# Patient Record
Sex: FEMALE | Race: WHITE | NOT HISPANIC OR LATINO | Employment: OTHER | URBAN - METROPOLITAN AREA
[De-identification: names, ages, dates, MRNs, and addresses within clinical notes are randomized per-mention and may not be internally consistent; named-entity substitution may affect disease eponyms.]

---

## 2018-04-28 ENCOUNTER — HOSPITAL ENCOUNTER (EMERGENCY)
Facility: HOSPITAL | Age: 48
Discharge: HOME | End: 2018-04-28
Attending: EMERGENCY MEDICINE | Admitting: EMERGENCY MEDICINE

## 2018-04-28 ENCOUNTER — APPOINTMENT (OUTPATIENT)
Dept: GENERAL RADIOLOGY | Facility: HOSPITAL | Age: 48
End: 2018-04-28

## 2018-04-28 VITALS
HEART RATE: 75 BPM | SYSTOLIC BLOOD PRESSURE: 122 MMHG | WEIGHT: 210 LBS | BODY MASS INDEX: 33.75 KG/M2 | DIASTOLIC BLOOD PRESSURE: 81 MMHG | HEIGHT: 66 IN | RESPIRATION RATE: 18 BRPM | OXYGEN SATURATION: 100 % | TEMPERATURE: 97.5 F

## 2018-04-28 DIAGNOSIS — M79.672 LEFT FOOT PAIN: Primary | ICD-10-CM

## 2018-04-28 PROCEDURE — 73630 X-RAY EXAM OF FOOT: CPT

## 2018-04-28 PROCEDURE — 99283 EMERGENCY DEPT VISIT LOW MDM: CPT

## 2018-04-28 RX ORDER — DICLOFENAC SODIUM 75 MG/1
75 TABLET, DELAYED RELEASE ORAL 2 TIMES DAILY
Qty: 20 TABLET | Refills: 0 | Status: ON HOLD | OUTPATIENT
Start: 2018-04-28 | End: 2020-07-06

## 2018-04-28 RX ORDER — AMITRIPTYLINE HYDROCHLORIDE 100 MG/1
100 TABLET, FILM COATED ORAL NIGHTLY
Status: ON HOLD | COMMUNITY
End: 2020-07-06

## 2019-06-12 ENCOUNTER — APPOINTMENT (OUTPATIENT)
Dept: GENERAL RADIOLOGY | Facility: HOSPITAL | Age: 49
End: 2019-06-12

## 2019-06-12 ENCOUNTER — HOSPITAL ENCOUNTER (EMERGENCY)
Facility: HOSPITAL | Age: 49
Discharge: HOME | End: 2019-06-12
Attending: EMERGENCY MEDICINE | Admitting: EMERGENCY MEDICINE

## 2019-06-12 VITALS
DIASTOLIC BLOOD PRESSURE: 60 MMHG | HEART RATE: 98 BPM | HEIGHT: 66 IN | SYSTOLIC BLOOD PRESSURE: 113 MMHG | RESPIRATION RATE: 18 BRPM | BODY MASS INDEX: 30.86 KG/M2 | OXYGEN SATURATION: 96 % | TEMPERATURE: 97.6 F | WEIGHT: 192 LBS

## 2019-06-12 DIAGNOSIS — S52.571A OTHER CLOSED INTRA-ARTICULAR FRACTURE OF DISTAL END OF RIGHT RADIUS, INITIAL ENCOUNTER: Primary | ICD-10-CM

## 2019-06-12 PROCEDURE — 73110 X-RAY EXAM OF WRIST: CPT

## 2019-06-12 PROCEDURE — 99283 EMERGENCY DEPT VISIT LOW MDM: CPT

## 2019-06-12 RX ORDER — HYDROCODONE BITARTRATE AND ACETAMINOPHEN 5; 325 MG/1; MG/1
1 TABLET ORAL EVERY 6 HOURS PRN
Qty: 20 TABLET | Refills: 0 | OUTPATIENT
Start: 2019-06-12 | End: 2020-06-16

## 2019-06-12 RX ORDER — ONDANSETRON 4 MG/1
4 TABLET, ORALLY DISINTEGRATING ORAL ONCE
Status: COMPLETED | OUTPATIENT
Start: 2019-06-12 | End: 2019-06-12

## 2019-06-12 RX ORDER — HYDROCODONE BITARTRATE AND ACETAMINOPHEN 5; 325 MG/1; MG/1
1 TABLET ORAL ONCE
Status: COMPLETED | OUTPATIENT
Start: 2019-06-12 | End: 2019-06-12

## 2019-06-12 RX ADMIN — ONDANSETRON 4 MG: 4 TABLET, ORALLY DISINTEGRATING ORAL at 19:41

## 2019-06-12 RX ADMIN — HYDROCODONE BITARTRATE AND ACETAMINOPHEN 1 TABLET: 5; 325 TABLET ORAL at 19:41

## 2019-06-13 ENCOUNTER — APPOINTMENT (OUTPATIENT)
Dept: GENERAL RADIOLOGY | Facility: HOSPITAL | Age: 49
End: 2019-06-13

## 2019-06-13 ENCOUNTER — APPOINTMENT (OUTPATIENT)
Dept: CT IMAGING | Facility: HOSPITAL | Age: 49
End: 2019-06-13

## 2019-06-13 ENCOUNTER — HOSPITAL ENCOUNTER (EMERGENCY)
Facility: HOSPITAL | Age: 49
Discharge: HOME | End: 2019-06-13
Attending: EMERGENCY MEDICINE | Admitting: EMERGENCY MEDICINE

## 2019-06-13 VITALS
BODY MASS INDEX: 32.54 KG/M2 | TEMPERATURE: 98.9 F | HEART RATE: 78 BPM | DIASTOLIC BLOOD PRESSURE: 91 MMHG | SYSTOLIC BLOOD PRESSURE: 163 MMHG | WEIGHT: 207.3 LBS | RESPIRATION RATE: 15 BRPM | HEIGHT: 67 IN | OXYGEN SATURATION: 95 %

## 2019-06-13 DIAGNOSIS — S40.021A CONTUSION OF MULTIPLE SITES OF RIGHT UPPER EXTREMITY, INITIAL ENCOUNTER: ICD-10-CM

## 2019-06-13 DIAGNOSIS — R93.0 ABNORMAL HEAD CT: ICD-10-CM

## 2019-06-13 DIAGNOSIS — Z91.14 NONCOMPLIANCE WITH MEDICATION REGIMEN: ICD-10-CM

## 2019-06-13 DIAGNOSIS — R56.9 SEIZURE (HCC): Primary | ICD-10-CM

## 2019-06-13 DIAGNOSIS — S20.212A CONTUSION OF LEFT CHEST WALL, INITIAL ENCOUNTER: ICD-10-CM

## 2019-06-13 LAB
ANION GAP SERPL CALCULATED.3IONS-SCNC: 9.8 MMOL/L
BASOPHILS # BLD AUTO: 0.04 10*3/MM3 (ref 0–0.2)
BASOPHILS NFR BLD AUTO: 0.4 % (ref 0–1.5)
BUN BLD-MCNC: 11 MG/DL (ref 6–20)
BUN/CREAT SERPL: 16.2 (ref 7–25)
CALCIUM SPEC-SCNC: 9.2 MG/DL (ref 8.6–10.5)
CHLORIDE SERPL-SCNC: 105 MMOL/L (ref 98–107)
CO2 SERPL-SCNC: 28.2 MMOL/L (ref 22–29)
CREAT BLD-MCNC: 0.68 MG/DL (ref 0.57–1)
DEPRECATED RDW RBC AUTO: 43 FL (ref 37–54)
EOSINOPHIL # BLD AUTO: 0.07 10*3/MM3 (ref 0–0.4)
EOSINOPHIL NFR BLD AUTO: 0.7 % (ref 0.3–6.2)
ERYTHROCYTE [DISTWIDTH] IN BLOOD BY AUTOMATED COUNT: 12.7 % (ref 12.3–15.4)
GFR SERPL CREATININE-BSD FRML MDRD: 92 ML/MIN/1.73
GLUCOSE BLD-MCNC: 100 MG/DL (ref 65–99)
HCT VFR BLD AUTO: 48.9 % (ref 34–46.6)
HGB BLD-MCNC: 15.4 G/DL (ref 12–15.9)
IMM GRANULOCYTES # BLD AUTO: 0.03 10*3/MM3 (ref 0–0.05)
IMM GRANULOCYTES NFR BLD AUTO: 0.3 % (ref 0–0.5)
LYMPHOCYTES # BLD AUTO: 0.95 10*3/MM3 (ref 0.7–3.1)
LYMPHOCYTES NFR BLD AUTO: 9.8 % (ref 19.6–45.3)
MCH RBC QN AUTO: 28.8 PG (ref 26.6–33)
MCHC RBC AUTO-ENTMCNC: 31.5 G/DL (ref 31.5–35.7)
MCV RBC AUTO: 91.6 FL (ref 79–97)
MONOCYTES # BLD AUTO: 0.6 10*3/MM3 (ref 0.1–0.9)
MONOCYTES NFR BLD AUTO: 6.2 % (ref 5–12)
NEUTROPHILS # BLD AUTO: 8.05 10*3/MM3 (ref 1.7–7)
NEUTROPHILS NFR BLD AUTO: 82.6 % (ref 42.7–76)
NRBC BLD AUTO-RTO: 0 /100 WBC (ref 0–0.2)
PLATELET # BLD AUTO: 275 10*3/MM3 (ref 140–450)
PMV BLD AUTO: 8.9 FL (ref 6–12)
POTASSIUM BLD-SCNC: 4.7 MMOL/L (ref 3.5–5.2)
RBC # BLD AUTO: 5.34 10*6/MM3 (ref 3.77–5.28)
SODIUM BLD-SCNC: 143 MMOL/L (ref 136–145)
WBC NRBC COR # BLD: 9.74 10*3/MM3 (ref 3.4–10.8)

## 2019-06-13 PROCEDURE — 96374 THER/PROPH/DIAG INJ IV PUSH: CPT

## 2019-06-13 PROCEDURE — 25010000002 MORPHINE PER 10 MG: Performed by: EMERGENCY MEDICINE

## 2019-06-13 PROCEDURE — 73110 X-RAY EXAM OF WRIST: CPT

## 2019-06-13 PROCEDURE — 99284 EMERGENCY DEPT VISIT MOD MDM: CPT

## 2019-06-13 PROCEDURE — 85025 COMPLETE CBC W/AUTO DIFF WBC: CPT | Performed by: PHYSICIAN ASSISTANT

## 2019-06-13 PROCEDURE — 80048 BASIC METABOLIC PNL TOTAL CA: CPT | Performed by: PHYSICIAN ASSISTANT

## 2019-06-13 PROCEDURE — 73090 X-RAY EXAM OF FOREARM: CPT

## 2019-06-13 PROCEDURE — 71046 X-RAY EXAM CHEST 2 VIEWS: CPT

## 2019-06-13 PROCEDURE — 25010000002 ONDANSETRON PER 1 MG: Performed by: PHYSICIAN ASSISTANT

## 2019-06-13 PROCEDURE — 70450 CT HEAD/BRAIN W/O DYE: CPT

## 2019-06-13 PROCEDURE — 73070 X-RAY EXAM OF ELBOW: CPT

## 2019-06-13 PROCEDURE — 96375 TX/PRO/DX INJ NEW DRUG ADDON: CPT

## 2019-06-13 RX ORDER — ZONISAMIDE 100 MG/1
CAPSULE ORAL
Qty: 90 CAPSULE | Refills: 0 | Status: ON HOLD | OUTPATIENT
Start: 2019-06-13 | End: 2020-07-06

## 2019-06-13 RX ORDER — MORPHINE SULFATE 2 MG/ML
4 INJECTION, SOLUTION INTRAMUSCULAR; INTRAVENOUS ONCE
Status: COMPLETED | OUTPATIENT
Start: 2019-06-13 | End: 2019-06-13

## 2019-06-13 RX ORDER — ONDANSETRON 2 MG/ML
4 INJECTION INTRAMUSCULAR; INTRAVENOUS ONCE
Status: COMPLETED | OUTPATIENT
Start: 2019-06-13 | End: 2019-06-13

## 2019-06-13 RX ADMIN — ONDANSETRON 4 MG: 2 INJECTION INTRAMUSCULAR; INTRAVENOUS at 11:20

## 2019-06-13 RX ADMIN — MORPHINE SULFATE 4 MG: 2 INJECTION, SOLUTION INTRAMUSCULAR; INTRAVENOUS at 11:22

## 2020-06-16 ENCOUNTER — APPOINTMENT (OUTPATIENT)
Dept: CT IMAGING | Facility: HOSPITAL | Age: 50
End: 2020-06-16

## 2020-06-16 ENCOUNTER — APPOINTMENT (OUTPATIENT)
Dept: GENERAL RADIOLOGY | Facility: HOSPITAL | Age: 50
End: 2020-06-16

## 2020-06-16 ENCOUNTER — HOSPITAL ENCOUNTER (EMERGENCY)
Facility: HOSPITAL | Age: 50
Discharge: HOME | End: 2020-06-16
Attending: EMERGENCY MEDICINE | Admitting: EMERGENCY MEDICINE

## 2020-06-16 VITALS
RESPIRATION RATE: 22 BRPM | HEIGHT: 66 IN | DIASTOLIC BLOOD PRESSURE: 92 MMHG | BODY MASS INDEX: 28.93 KG/M2 | WEIGHT: 180 LBS | HEART RATE: 98 BPM | TEMPERATURE: 97.4 F | OXYGEN SATURATION: 95 % | SYSTOLIC BLOOD PRESSURE: 124 MMHG

## 2020-06-16 DIAGNOSIS — R10.9 RIGHT SIDED ABDOMINAL PAIN: ICD-10-CM
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
## 2020-06-16 DIAGNOSIS — S22.41XA CLOSED FRACTURE OF MULTIPLE RIBS OF RIGHT SIDE, INITIAL ENCOUNTER: ICD-10-CM

## 2020-06-16 LAB
ALBUMIN SERPL-MCNC: 3.5 G/DL (ref 3.5–5.2)
ALBUMIN/GLOB SERPL: 1.5 G/DL
ALP SERPL-CCNC: 96 U/L (ref 39–117)
ALT SERPL W P-5'-P-CCNC: 18 U/L (ref 1–33)
AMPHET+METHAMPHET UR QL: NEGATIVE
ANION GAP SERPL CALCULATED.3IONS-SCNC: 9 MMOL/L (ref 5–15)
AST SERPL-CCNC: 26 U/L (ref 1–32)
BARBITURATES UR QL SCN: NEGATIVE
BASOPHILS # BLD AUTO: 0.06 10*3/MM3 (ref 0–0.2)
BASOPHILS NFR BLD AUTO: 0.7 % (ref 0–1.5)
BENZODIAZ UR QL SCN: NEGATIVE
BILIRUB SERPL-MCNC: 0.6 MG/DL (ref 0.2–1.2)
BILIRUB UR QL STRIP: NEGATIVE
BUN BLD-MCNC: 17 MG/DL (ref 6–20)
BUN/CREAT SERPL: 16.7 (ref 7–25)
CALCIUM SPEC-SCNC: 8.6 MG/DL (ref 8.6–10.5)
CANNABINOIDS SERPL QL: POSITIVE
CHLORIDE SERPL-SCNC: 103 MMOL/L (ref 98–107)
CLARITY UR: ABNORMAL
CO2 SERPL-SCNC: 25 MMOL/L (ref 22–29)
COCAINE UR QL: NEGATIVE
COLOR UR: YELLOW
CREAT BLD-MCNC: 1.02 MG/DL (ref 0.57–1)
DEPRECATED RDW RBC AUTO: 43.4 FL (ref 37–54)
EOSINOPHIL # BLD AUTO: 0.08 10*3/MM3 (ref 0–0.4)
EOSINOPHIL NFR BLD AUTO: 0.9 % (ref 0.3–6.2)
ERYTHROCYTE [DISTWIDTH] IN BLOOD BY AUTOMATED COUNT: 13.2 % (ref 12.3–15.4)
ETHANOL BLD-MCNC: <10 MG/DL (ref 0–10)
ETHANOL UR QL: <0.01 %
GFR SERPL CREATININE-BSD FRML MDRD: 57 ML/MIN/1.73
GLOBULIN UR ELPH-MCNC: 2.4 GM/DL
GLUCOSE BLD-MCNC: 95 MG/DL (ref 65–99)
GLUCOSE UR STRIP-MCNC: NEGATIVE MG/DL
HCT VFR BLD AUTO: 41.1 % (ref 34–46.6)
HGB BLD-MCNC: 13.7 G/DL (ref 12–15.9)
HGB UR QL STRIP.AUTO: NEGATIVE
HOLD SPECIMEN: NORMAL
HOLD SPECIMEN: NORMAL
IMM GRANULOCYTES # BLD AUTO: 0.03 10*3/MM3 (ref 0–0.05)
IMM GRANULOCYTES NFR BLD AUTO: 0.3 % (ref 0–0.5)
KETONES UR QL STRIP: ABNORMAL
LEUKOCYTE ESTERASE UR QL STRIP.AUTO: NEGATIVE
LIPASE SERPL-CCNC: 10 U/L (ref 13–60)
LYMPHOCYTES # BLD AUTO: 1.77 10*3/MM3 (ref 0.7–3.1)
LYMPHOCYTES NFR BLD AUTO: 20.2 % (ref 19.6–45.3)
MCH RBC QN AUTO: 29.7 PG (ref 26.6–33)
MCHC RBC AUTO-ENTMCNC: 33.3 G/DL (ref 31.5–35.7)
MCV RBC AUTO: 89 FL (ref 79–97)
METHADONE UR QL SCN: NEGATIVE
MONOCYTES # BLD AUTO: 0.59 10*3/MM3 (ref 0.1–0.9)
MONOCYTES NFR BLD AUTO: 6.7 % (ref 5–12)
NEUTROPHILS # BLD AUTO: 6.24 10*3/MM3 (ref 1.7–7)
NEUTROPHILS NFR BLD AUTO: 71.2 % (ref 42.7–76)
NITRITE UR QL STRIP: NEGATIVE
NRBC BLD AUTO-RTO: 0.1 /100 WBC (ref 0–0.2)
OPIATES UR QL: POSITIVE
OXYCODONE UR QL SCN: NEGATIVE
PH UR STRIP.AUTO: 5.5 [PH] (ref 5–8)
PLATELET # BLD AUTO: 194 10*3/MM3 (ref 140–450)
PMV BLD AUTO: 9.4 FL (ref 6–12)
POTASSIUM BLD-SCNC: 3.7 MMOL/L (ref 3.5–5.2)
PROT SERPL-MCNC: 5.9 G/DL (ref 6–8.5)
PROT UR QL STRIP: NEGATIVE
RBC # BLD AUTO: 4.62 10*6/MM3 (ref 3.77–5.28)
SODIUM BLD-SCNC: 137 MMOL/L (ref 136–145)
SP GR UR STRIP: >=1.03 (ref 1–1.03)
TROPONIN T SERPL-MCNC: <0.01 NG/ML (ref 0–0.03)
UROBILINOGEN UR QL STRIP: ABNORMAL
WBC NRBC COR # BLD: 8.77 10*3/MM3 (ref 3.4–10.8)
WHOLE BLOOD HOLD SPECIMEN: NORMAL
WHOLE BLOOD HOLD SPECIMEN: NORMAL

## 2020-06-16 PROCEDURE — 72125 CT NECK SPINE W/O DYE: CPT

## 2020-06-16 PROCEDURE — 96374 THER/PROPH/DIAG INJ IV PUSH: CPT

## 2020-06-16 PROCEDURE — 80307 DRUG TEST PRSMV CHEM ANLYZR: CPT | Performed by: NURSE PRACTITIONER

## 2020-06-16 PROCEDURE — 25010000002 ONDANSETRON PER 1 MG: Performed by: NURSE PRACTITIONER

## 2020-06-16 PROCEDURE — 25010000002 IOPAMIDOL 61 % SOLUTION: Performed by: EMERGENCY MEDICINE

## 2020-06-16 PROCEDURE — 70450 CT HEAD/BRAIN W/O DYE: CPT

## 2020-06-16 PROCEDURE — 84484 ASSAY OF TROPONIN QUANT: CPT | Performed by: NURSE PRACTITIONER

## 2020-06-16 PROCEDURE — 93005 ELECTROCARDIOGRAM TRACING: CPT | Performed by: NURSE PRACTITIONER

## 2020-06-16 PROCEDURE — 81003 URINALYSIS AUTO W/O SCOPE: CPT | Performed by: NURSE PRACTITIONER

## 2020-06-16 PROCEDURE — 71260 CT THORAX DX C+: CPT

## 2020-06-16 PROCEDURE — 74177 CT ABD & PELVIS W/CONTRAST: CPT

## 2020-06-16 PROCEDURE — 93010 ELECTROCARDIOGRAM REPORT: CPT | Performed by: INTERNAL MEDICINE

## 2020-06-16 PROCEDURE — 85025 COMPLETE CBC W/AUTO DIFF WBC: CPT | Performed by: NURSE PRACTITIONER

## 2020-06-16 PROCEDURE — 80053 COMPREHEN METABOLIC PANEL: CPT | Performed by: NURSE PRACTITIONER

## 2020-06-16 PROCEDURE — 73030 X-RAY EXAM OF SHOULDER: CPT

## 2020-06-16 PROCEDURE — 99283 EMERGENCY DEPT VISIT LOW MDM: CPT

## 2020-06-16 PROCEDURE — 36415 COLL VENOUS BLD VENIPUNCTURE: CPT

## 2020-06-16 PROCEDURE — 83690 ASSAY OF LIPASE: CPT | Performed by: NURSE PRACTITIONER

## 2020-06-16 RX ORDER — LIDOCAINE 50 MG/G
1 PATCH TOPICAL EVERY 24 HOURS
Qty: 15 PATCH | Refills: 0 | Status: ON HOLD | OUTPATIENT
Start: 2020-06-16 | End: 2020-07-06

## 2020-06-16 RX ORDER — SODIUM CHLORIDE 0.9 % (FLUSH) 0.9 %
10 SYRINGE (ML) INJECTION AS NEEDED
Status: DISCONTINUED | OUTPATIENT
Start: 2020-06-16 | End: 2020-06-16 | Stop reason: HOSPADM

## 2020-06-16 RX ORDER — ZONISAMIDE 100 MG/1
200 CAPSULE ORAL
Status: ON HOLD | COMMUNITY
Start: 2020-02-26 | End: 2020-07-06

## 2020-06-16 RX ORDER — AMITRIPTYLINE HYDROCHLORIDE 100 MG/1
300 TABLET, FILM COATED ORAL DAILY
Status: ON HOLD | COMMUNITY
End: 2020-07-06

## 2020-06-16 RX ORDER — DULOXETIN HYDROCHLORIDE 30 MG/1
CAPSULE, DELAYED RELEASE ORAL
Status: ON HOLD | COMMUNITY
Start: 2019-08-28 | End: 2020-07-06

## 2020-06-16 RX ORDER — ONDANSETRON 2 MG/ML
4 INJECTION INTRAMUSCULAR; INTRAVENOUS ONCE
Status: COMPLETED | OUTPATIENT
Start: 2020-06-16 | End: 2020-06-16

## 2020-06-16 RX ORDER — HYDROCODONE BITARTRATE AND ACETAMINOPHEN 5; 325 MG/1; MG/1
1 TABLET ORAL EVERY 8 HOURS PRN
Qty: 6 TABLET | Refills: 0 | Status: ON HOLD | OUTPATIENT
Start: 2020-06-16 | End: 2020-07-06

## 2020-06-16 RX ORDER — MORPHINE SULFATE 2 MG/ML
4 INJECTION, SOLUTION INTRAMUSCULAR; INTRAVENOUS ONCE
Status: DISCONTINUED | OUTPATIENT
Start: 2020-06-16 | End: 2020-06-16

## 2020-06-16 RX ADMIN — ONDANSETRON 4 MG: 2 INJECTION INTRAMUSCULAR; INTRAVENOUS at 11:32

## 2020-06-16 RX ADMIN — IOPAMIDOL 85 ML: 612 INJECTION, SOLUTION INTRAVENOUS at 11:50
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
## 2020-06-16 NOTE — DISCHARGE INSTRUCTIONS
Home to rest  Drink plenty of fluids  Sit and stand slowly  Always wear your seatbelt   Splint your chest wall when coughing and deep breathing ot prevent pneumonia as discussed  Used lidocaine patches for pain  Take medications as prescribed. Do not drive on the medication as it may impair your judgment, cause dizziness or drowsiness.   Return if worse or new concerns   Continue care with your primary care physician and have your blood pressure regularly checked and managed. Normal blood pressure is 120/80.

## 2020-06-16 NOTE — ED NOTES
Pt ambulatory from triage to room.   Patient was placed in face mask during first look triage.  Patient was wearing a face mask throughout encounter.  I wore personal protective equipment throughout the encounter.  Hand hygiene was performed before and after patient encounter.         Vilma Hay RN  06/16/20 0832
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
## 2020-07-02 ENCOUNTER — APPOINTMENT (OUTPATIENT)
Dept: CT IMAGING | Facility: HOSPITAL | Age: 50
End: 2020-07-02

## 2020-07-02 ENCOUNTER — HOSPITAL ENCOUNTER (INPATIENT)
Facility: HOSPITAL | Age: 50
LOS: 8 days | Discharge: HOME | End: 2020-07-10
Attending: EMERGENCY MEDICINE | Admitting: HOSPITALIST

## 2020-07-02 DIAGNOSIS — F11.93: ICD-10-CM

## 2020-07-02 DIAGNOSIS — R56.9 POST-ICTAL STATE (HCC): ICD-10-CM

## 2020-07-02 DIAGNOSIS — R56.9 SEIZURES (HCC): Primary | ICD-10-CM

## 2020-07-02 DIAGNOSIS — G89.4 CHRONIC PAIN SYNDROME: ICD-10-CM

## 2020-07-02 LAB
ALBUMIN SERPL-MCNC: 3.7 G/DL (ref 3.5–5.2)
ALBUMIN/GLOB SERPL: 1.4 G/DL
ALP SERPL-CCNC: 270 U/L (ref 39–117)
ALT SERPL W P-5'-P-CCNC: 15 U/L (ref 1–33)
ANION GAP SERPL CALCULATED.3IONS-SCNC: 10.6 MMOL/L (ref 5–15)
AST SERPL-CCNC: 15 U/L (ref 1–32)
BASOPHILS # BLD AUTO: 0.06 10*3/MM3 (ref 0–0.2)
BASOPHILS NFR BLD AUTO: 0.8 % (ref 0–1.5)
BILIRUB SERPL-MCNC: 0.3 MG/DL (ref 0.2–1.2)
BUN SERPL-MCNC: 11 MG/DL (ref 6–20)
BUN/CREAT SERPL: 12.4 (ref 7–25)
CALCIUM SPEC-SCNC: 9.1 MG/DL (ref 8.6–10.5)
CHLORIDE SERPL-SCNC: 104 MMOL/L (ref 98–107)
CO2 SERPL-SCNC: 24.4 MMOL/L (ref 22–29)
CREAT SERPL-MCNC: 0.89 MG/DL (ref 0.57–1)
DEPRECATED RDW RBC AUTO: 39.7 FL (ref 37–54)
EOSINOPHIL # BLD AUTO: 0.14 10*3/MM3 (ref 0–0.4)
EOSINOPHIL NFR BLD AUTO: 1.9 % (ref 0.3–6.2)
ERYTHROCYTE [DISTWIDTH] IN BLOOD BY AUTOMATED COUNT: 12.5 % (ref 12.3–15.4)
ETHANOL BLD-MCNC: <10 MG/DL (ref 0–10)
ETHANOL UR QL: <0.01 %
GFR SERPL CREATININE-BSD FRML MDRD: 67 ML/MIN/1.73
GLOBULIN UR ELPH-MCNC: 2.7 GM/DL
GLUCOSE SERPL-MCNC: 123 MG/DL (ref 65–99)
HCT VFR BLD AUTO: 40.9 % (ref 34–46.6)
HGB BLD-MCNC: 13.8 G/DL (ref 12–15.9)
HOLD SPECIMEN: NORMAL
HOLD SPECIMEN: NORMAL
IMM GRANULOCYTES # BLD AUTO: 0.03 10*3/MM3 (ref 0–0.05)
IMM GRANULOCYTES NFR BLD AUTO: 0.4 % (ref 0–0.5)
LYMPHOCYTES # BLD AUTO: 2.04 10*3/MM3 (ref 0.7–3.1)
LYMPHOCYTES NFR BLD AUTO: 27.2 % (ref 19.6–45.3)
MCH RBC QN AUTO: 29.4 PG (ref 26.6–33)
MCHC RBC AUTO-ENTMCNC: 33.7 G/DL (ref 31.5–35.7)
MCV RBC AUTO: 87 FL (ref 79–97)
MONOCYTES # BLD AUTO: 0.45 10*3/MM3 (ref 0.1–0.9)
MONOCYTES NFR BLD AUTO: 6 % (ref 5–12)
NEUTROPHILS NFR BLD AUTO: 4.79 10*3/MM3 (ref 1.7–7)
NEUTROPHILS NFR BLD AUTO: 63.7 % (ref 42.7–76)
NRBC BLD AUTO-RTO: 0.1 /100 WBC (ref 0–0.2)
PLATELET # BLD AUTO: 451 10*3/MM3 (ref 140–450)
PMV BLD AUTO: 8.8 FL (ref 6–12)
POTASSIUM SERPL-SCNC: 3.3 MMOL/L (ref 3.5–5.2)
PROT SERPL-MCNC: 6.4 G/DL (ref 6–8.5)
RBC # BLD AUTO: 4.7 10*6/MM3 (ref 3.77–5.28)
SODIUM SERPL-SCNC: 139 MMOL/L (ref 136–145)
TROPONIN T SERPL-MCNC: <0.01 NG/ML (ref 0–0.03)
WBC # BLD AUTO: 7.51 10*3/MM3 (ref 3.4–10.8)
WHOLE BLOOD HOLD SPECIMEN: NORMAL
WHOLE BLOOD HOLD SPECIMEN: NORMAL

## 2020-07-02 PROCEDURE — 70450 CT HEAD/BRAIN W/O DYE: CPT

## 2020-07-02 PROCEDURE — 93010 ELECTROCARDIOGRAM REPORT: CPT | Performed by: INTERNAL MEDICINE

## 2020-07-02 PROCEDURE — 99284 EMERGENCY DEPT VISIT MOD MDM: CPT

## 2020-07-02 PROCEDURE — 25010000003 LEVETIRACETAM IN NACL 0.75% 1000 MG/100ML SOLUTION: Performed by: EMERGENCY MEDICINE

## 2020-07-02 PROCEDURE — 84484 ASSAY OF TROPONIN QUANT: CPT | Performed by: EMERGENCY MEDICINE

## 2020-07-02 PROCEDURE — 25010000002 LORAZEPAM PER 2 MG: Performed by: EMERGENCY MEDICINE

## 2020-07-02 PROCEDURE — 93005 ELECTROCARDIOGRAM TRACING: CPT | Performed by: EMERGENCY MEDICINE

## 2020-07-02 PROCEDURE — 80307 DRUG TEST PRSMV CHEM ANLYZR: CPT | Performed by: EMERGENCY MEDICINE

## 2020-07-02 PROCEDURE — 85025 COMPLETE CBC W/AUTO DIFF WBC: CPT | Performed by: EMERGENCY MEDICINE

## 2020-07-02 PROCEDURE — 80053 COMPREHEN METABOLIC PANEL: CPT | Performed by: EMERGENCY MEDICINE

## 2020-07-02 RX ORDER — SODIUM CHLORIDE 9 MG/ML
100 INJECTION, SOLUTION INTRAVENOUS CONTINUOUS
Status: DISCONTINUED | OUTPATIENT
Start: 2020-07-02 | End: 2020-07-06

## 2020-07-02 RX ORDER — LORAZEPAM 2 MG/ML
1 INJECTION INTRAMUSCULAR
Status: DISCONTINUED | OUTPATIENT
Start: 2020-07-02 | End: 2020-07-10 | Stop reason: HOSPADM

## 2020-07-02 RX ORDER — ACETAMINOPHEN 650 MG/1
650 SUPPOSITORY RECTAL EVERY 4 HOURS PRN
Status: DISCONTINUED | OUTPATIENT
Start: 2020-07-02 | End: 2020-07-03

## 2020-07-02 RX ORDER — LORAZEPAM 2 MG/ML
1 INJECTION INTRAMUSCULAR ONCE
Status: COMPLETED | OUTPATIENT
Start: 2020-07-02 | End: 2020-07-02

## 2020-07-02 RX ORDER — LEVETIRACETAM 10 MG/ML
1000 INJECTION INTRAVASCULAR ONCE
Status: COMPLETED | OUTPATIENT
Start: 2020-07-02 | End: 2020-07-02

## 2020-07-02 RX ORDER — SODIUM CHLORIDE 0.9 % (FLUSH) 0.9 %
10 SYRINGE (ML) INJECTION AS NEEDED
Status: DISCONTINUED | OUTPATIENT
Start: 2020-07-02 | End: 2020-07-10 | Stop reason: HOSPADM

## 2020-07-02 RX ORDER — AMITRIPTYLINE HYDROCHLORIDE 50 MG/1
100 TABLET, FILM COATED ORAL NIGHTLY
Status: DISCONTINUED | OUTPATIENT
Start: 2020-07-02 | End: 2020-07-03

## 2020-07-02 RX ORDER — ZONISAMIDE 100 MG/1
100 CAPSULE ORAL EVERY MORNING
Status: DISCONTINUED | OUTPATIENT
Start: 2020-07-03 | End: 2020-07-03

## 2020-07-02 RX ORDER — SODIUM CHLORIDE 0.9 % (FLUSH) 0.9 %
10 SYRINGE (ML) INJECTION EVERY 12 HOURS SCHEDULED
Status: DISCONTINUED | OUTPATIENT
Start: 2020-07-02 | End: 2020-07-10 | Stop reason: HOSPADM

## 2020-07-02 RX ORDER — ACETAMINOPHEN 160 MG/5ML
650 SOLUTION ORAL EVERY 4 HOURS PRN
Status: DISCONTINUED | OUTPATIENT
Start: 2020-07-02 | End: 2020-07-03

## 2020-07-02 RX ORDER — GABAPENTIN 300 MG/1
300 CAPSULE ORAL 3 TIMES DAILY
Status: DISCONTINUED | OUTPATIENT
Start: 2020-07-02 | End: 2020-07-03

## 2020-07-02 RX ORDER — VENLAFAXINE 75 MG/1
150 TABLET ORAL DAILY
Status: DISCONTINUED | OUTPATIENT
Start: 2020-07-03 | End: 2020-07-04

## 2020-07-02 RX ORDER — ONDANSETRON 2 MG/ML
4 INJECTION INTRAMUSCULAR; INTRAVENOUS EVERY 6 HOURS PRN
Status: DISCONTINUED | OUTPATIENT
Start: 2020-07-02 | End: 2020-07-10 | Stop reason: HOSPADM

## 2020-07-02 RX ORDER — LEVETIRACETAM 500 MG/1
1000 TABLET ORAL 2 TIMES DAILY
Status: ON HOLD | COMMUNITY
End: 2020-07-06

## 2020-07-02 RX ORDER — DULOXETIN HYDROCHLORIDE 30 MG/1
30 CAPSULE, DELAYED RELEASE ORAL DAILY
Status: DISCONTINUED | OUTPATIENT
Start: 2020-07-03 | End: 2020-07-04

## 2020-07-02 RX ORDER — ZONISAMIDE 100 MG/1
200 CAPSULE ORAL EVERY EVENING
Status: DISCONTINUED | OUTPATIENT
Start: 2020-07-02 | End: 2020-07-03

## 2020-07-02 RX ORDER — ACETAMINOPHEN 325 MG/1
650 TABLET ORAL EVERY 4 HOURS PRN
Status: DISCONTINUED | OUTPATIENT
Start: 2020-07-02 | End: 2020-07-03

## 2020-07-02 RX ORDER — NITROGLYCERIN 0.4 MG/1
0.4 TABLET SUBLINGUAL
Status: DISCONTINUED | OUTPATIENT
Start: 2020-07-02 | End: 2020-07-10 | Stop reason: HOSPADM

## 2020-07-02 RX ADMIN — SODIUM CHLORIDE 100 ML/HR: 9 INJECTION, SOLUTION INTRAVENOUS at 23:11

## 2020-07-02 RX ADMIN — LORAZEPAM 1 MG: 2 INJECTION INTRAMUSCULAR; INTRAVENOUS at 19:09

## 2020-07-02 RX ADMIN — LEVETIRACETAM 1000 MG: 10 INJECTION INTRAVENOUS at 20:43

## 2020-07-02 RX ADMIN — SODIUM CHLORIDE, PRESERVATIVE FREE 10 ML: 5 INJECTION INTRAVENOUS at 23:11
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
## 2020-07-02 NOTE — ED NOTES
Seizure activity witnessed tonic clonic lasting 15 seconds.  Dr. Smith notified.     Deny Flaherty, RN  07/02/20 1912

## 2020-07-02 NOTE — ED NOTES
Pt has history of seizures.  Pt had seizure today around 5 pm then again in car around 6pm per .   states they were in the car on the way here.  Mask placed on patient in triage.  Triage RN wearing mask throughout encounter.       Fernandez Santiago, RN  07/02/20 3977

## 2020-07-03 PROBLEM — E87.6 HYPOPOTASSEMIA: Status: ACTIVE | Noted: 2020-07-03

## 2020-07-03 LAB
ALBUMIN SERPL-MCNC: 3.4 G/DL (ref 3.5–5.2)
ALBUMIN/GLOB SERPL: 1.4 G/DL
ALP SERPL-CCNC: 241 U/L (ref 39–117)
ALT SERPL W P-5'-P-CCNC: 15 U/L (ref 1–33)
ANION GAP SERPL CALCULATED.3IONS-SCNC: 8.3 MMOL/L (ref 5–15)
AST SERPL-CCNC: 15 U/L (ref 1–32)
BASOPHILS # BLD AUTO: 0.06 10*3/MM3 (ref 0–0.2)
BASOPHILS NFR BLD AUTO: 0.8 % (ref 0–1.5)
BILIRUB SERPL-MCNC: 0.4 MG/DL (ref 0.2–1.2)
BUN SERPL-MCNC: 11 MG/DL (ref 6–20)
BUN/CREAT SERPL: 16.2 (ref 7–25)
CALCIUM SPEC-SCNC: 8.7 MG/DL (ref 8.6–10.5)
CHLORIDE SERPL-SCNC: 106 MMOL/L (ref 98–107)
CK SERPL-CCNC: 114 U/L (ref 20–180)
CO2 SERPL-SCNC: 25.7 MMOL/L (ref 22–29)
CREAT SERPL-MCNC: 0.68 MG/DL (ref 0.57–1)
DEPRECATED RDW RBC AUTO: 38.7 FL (ref 37–54)
EOSINOPHIL # BLD AUTO: 0.07 10*3/MM3 (ref 0–0.4)
EOSINOPHIL NFR BLD AUTO: 0.9 % (ref 0.3–6.2)
ERYTHROCYTE [DISTWIDTH] IN BLOOD BY AUTOMATED COUNT: 12.3 % (ref 12.3–15.4)
GFR SERPL CREATININE-BSD FRML MDRD: 92 ML/MIN/1.73
GLOBULIN UR ELPH-MCNC: 2.4 GM/DL
GLUCOSE SERPL-MCNC: 93 MG/DL (ref 65–99)
HCT VFR BLD AUTO: 39 % (ref 34–46.6)
HGB BLD-MCNC: 13.2 G/DL (ref 12–15.9)
IMM GRANULOCYTES # BLD AUTO: 0.03 10*3/MM3 (ref 0–0.05)
IMM GRANULOCYTES NFR BLD AUTO: 0.4 % (ref 0–0.5)
LYMPHOCYTES # BLD AUTO: 1.04 10*3/MM3 (ref 0.7–3.1)
LYMPHOCYTES NFR BLD AUTO: 13.7 % (ref 19.6–45.3)
MAGNESIUM SERPL-MCNC: 2.3 MG/DL (ref 1.6–2.6)
MCH RBC QN AUTO: 29.2 PG (ref 26.6–33)
MCHC RBC AUTO-ENTMCNC: 33.8 G/DL (ref 31.5–35.7)
MCV RBC AUTO: 86.3 FL (ref 79–97)
MONOCYTES # BLD AUTO: 0.44 10*3/MM3 (ref 0.1–0.9)
MONOCYTES NFR BLD AUTO: 5.8 % (ref 5–12)
NEUTROPHILS NFR BLD AUTO: 5.96 10*3/MM3 (ref 1.7–7)
NEUTROPHILS NFR BLD AUTO: 78.4 % (ref 42.7–76)
NRBC BLD AUTO-RTO: 0 /100 WBC (ref 0–0.2)
PLATELET # BLD AUTO: 367 10*3/MM3 (ref 140–450)
PMV BLD AUTO: 8.6 FL (ref 6–12)
POTASSIUM SERPL-SCNC: 3.7 MMOL/L (ref 3.5–5.2)
PROT SERPL-MCNC: 5.8 G/DL (ref 6–8.5)
RBC # BLD AUTO: 4.52 10*6/MM3 (ref 3.77–5.28)
SODIUM SERPL-SCNC: 140 MMOL/L (ref 136–145)
WBC # BLD AUTO: 7.6 10*3/MM3 (ref 3.4–10.8)

## 2020-07-03 PROCEDURE — 92610 EVALUATE SWALLOWING FUNCTION: CPT | Performed by: SPEECH-LANGUAGE PATHOLOGIST

## 2020-07-03 PROCEDURE — 82550 ASSAY OF CK (CPK): CPT | Performed by: NURSE PRACTITIONER

## 2020-07-03 PROCEDURE — 25010000003 LEVETIRACETAM IN NACL 0.75% 1000 MG/100ML SOLUTION: Performed by: PSYCHIATRY & NEUROLOGY

## 2020-07-03 PROCEDURE — 85025 COMPLETE CBC W/AUTO DIFF WBC: CPT | Performed by: NURSE PRACTITIONER

## 2020-07-03 PROCEDURE — G0378 HOSPITAL OBSERVATION PER HR: HCPCS

## 2020-07-03 PROCEDURE — 83735 ASSAY OF MAGNESIUM: CPT | Performed by: HOSPITALIST

## 2020-07-03 PROCEDURE — 80203 DRUG SCREEN QUANT ZONISAMIDE: CPT | Performed by: NURSE PRACTITIONER

## 2020-07-03 PROCEDURE — 36415 COLL VENOUS BLD VENIPUNCTURE: CPT | Performed by: NURSE PRACTITIONER

## 2020-07-03 PROCEDURE — 25010000002 LORAZEPAM PER 2 MG: Performed by: NURSE PRACTITIONER

## 2020-07-03 PROCEDURE — 99222 1ST HOSP IP/OBS MODERATE 55: CPT | Performed by: PSYCHIATRY & NEUROLOGY

## 2020-07-03 PROCEDURE — 80053 COMPREHEN METABOLIC PANEL: CPT | Performed by: NURSE PRACTITIONER

## 2020-07-03 PROCEDURE — 25010000002 LEVETIRACETAM IN NACL 0.82% 500 MG/100ML SOLUTION: Performed by: HOSPITALIST

## 2020-07-03 RX ORDER — HYDROCODONE BITARTRATE AND ACETAMINOPHEN 7.5; 325 MG/1; MG/1
1 TABLET ORAL EVERY 6 HOURS PRN
Status: DISCONTINUED | OUTPATIENT
Start: 2020-07-03 | End: 2020-07-03

## 2020-07-03 RX ORDER — LEVETIRACETAM 5 MG/ML
500 INJECTION INTRAVASCULAR EVERY 12 HOURS SCHEDULED
Status: DISCONTINUED | OUTPATIENT
Start: 2020-07-03 | End: 2020-07-03

## 2020-07-03 RX ORDER — POTASSIUM CHLORIDE 1.5 G/1.77G
40 POWDER, FOR SOLUTION ORAL AS NEEDED
Status: DISCONTINUED | OUTPATIENT
Start: 2020-07-03 | End: 2020-07-10 | Stop reason: HOSPADM

## 2020-07-03 RX ORDER — POTASSIUM CHLORIDE 750 MG/1
40 CAPSULE, EXTENDED RELEASE ORAL AS NEEDED
Status: DISCONTINUED | OUTPATIENT
Start: 2020-07-03 | End: 2020-07-10 | Stop reason: HOSPADM

## 2020-07-03 RX ORDER — LEVETIRACETAM 10 MG/ML
1000 INJECTION INTRAVASCULAR EVERY 12 HOURS SCHEDULED
Status: DISCONTINUED | OUTPATIENT
Start: 2020-07-03 | End: 2020-07-07

## 2020-07-03 RX ORDER — HYDROCODONE BITARTRATE AND ACETAMINOPHEN 5; 325 MG/1; MG/1
1 TABLET ORAL EVERY 4 HOURS PRN
Status: DISCONTINUED | OUTPATIENT
Start: 2020-07-03 | End: 2020-07-04

## 2020-07-03 RX ORDER — AMITRIPTYLINE HYDROCHLORIDE 50 MG/1
50 TABLET, FILM COATED ORAL NIGHTLY
Status: DISCONTINUED | OUTPATIENT
Start: 2020-07-03 | End: 2020-07-03

## 2020-07-03 RX ORDER — POTASSIUM CHLORIDE 7.45 MG/ML
10 INJECTION INTRAVENOUS
Status: DISCONTINUED | OUTPATIENT
Start: 2020-07-03 | End: 2020-07-10 | Stop reason: HOSPADM

## 2020-07-03 RX ADMIN — GABAPENTIN 300 MG: 300 CAPSULE ORAL at 09:18

## 2020-07-03 RX ADMIN — LORAZEPAM 1 MG: 2 INJECTION INTRAMUSCULAR; INTRAVENOUS at 09:35

## 2020-07-03 RX ADMIN — HYDROCODONE BITARTRATE AND ACETAMINOPHEN 1 TABLET: 7.5; 325 TABLET ORAL at 11:46

## 2020-07-03 RX ADMIN — LEVETIRACETAM 500 MG: 5 INJECTION INTRAVENOUS at 08:02

## 2020-07-03 RX ADMIN — HYDROCODONE BITARTRATE AND ACETAMINOPHEN 1 TABLET: 5; 325 TABLET ORAL at 22:08

## 2020-07-03 RX ADMIN — SODIUM CHLORIDE, PRESERVATIVE FREE 10 ML: 5 INJECTION INTRAVENOUS at 08:03

## 2020-07-03 RX ADMIN — VENLAFAXINE HYDROCHLORIDE 150 MG: 75 TABLET ORAL at 08:02

## 2020-07-03 RX ADMIN — ZONISAMIDE 100 MG: 100 CAPSULE ORAL at 08:02

## 2020-07-03 RX ADMIN — LEVETIRACETAM 1000 MG: 10 INJECTION INTRAVENOUS at 20:40

## 2020-07-03 RX ADMIN — HYDROCODONE BITARTRATE AND ACETAMINOPHEN 1 TABLET: 5; 325 TABLET ORAL at 17:27

## 2020-07-03 RX ADMIN — SODIUM CHLORIDE, PRESERVATIVE FREE 10 ML: 5 INJECTION INTRAVENOUS at 20:40

## 2020-07-03 RX ADMIN — DULOXETINE HYDROCHLORIDE 30 MG: 30 CAPSULE, DELAYED RELEASE ORAL at 08:02

## 2020-07-03 NOTE — H&P
Patient Name:  Mary Chavez  YOB: 1970  MRN:  [id number]  Admit Date:  7/2/2020  Patient Care Team:  Provider, No Known as PCP - General      Subjective   History Present Illness     Chief Complaint   Patient presents with   • Seizures     History of Present Illness   Mrs. Chavez is a 49-year-old female with apparent history of seizure disorder who was brought to the emergency room for seizure.  According to the emergency room provider patient had 2 seizures at home, and was brought to the emergency room per her , patient did have another seizure in the emergency room and was given IV Ativan as well as IV Keppra.  During my exam patient is minimally responsive, withdraws to pain, likely due to the IV Ativan and postictal state.  Vital signs are stable.  Looking back of patient records, not sure who patient's neurologist is, she does have a KACIE Bruner that she sees on a regular basis.  Unable to obtain any information from patient, given her current mental status.  My information was obtained by speaking with the ED provider and reviewing chart.  In the emergency room patient had a glucose of 123, potassium 3.3, alkaline phosphatase 270, white blood cell count 7.51, hemoglobin 13.8.  CT head shows no acute intracranial hemorrhage or hydrocephalus, chronic changes of brain.  Alcohol level is negative.    Review of Systems   Unable to perform ROS: Patient unresponsive   According to ER notes, patient has history of seizure disorder and is on medications at home, and had a total of 3 seizures this afternoon.    Personal History     Past Medical History:   Diagnosis Date   • Seizures (CMS/HCC)      Past Surgical History:   Procedure Laterality Date   • BACK SURGERY     • GASTRIC BYPASS     • GASTRIC BYPASS  2007   • SUBTOTAL HYSTERECTOMY       History reviewed. No pertinent family history.  Social History     Tobacco Use   • Smoking status: Current Every Day Smoker     Packs/day: 1.50      Types: Cigarettes   Substance Use Topics   • Alcohol use: No   • Drug use: Yes     Types: Marijuana     No current facility-administered medications on file prior to encounter.      Current Outpatient Medications on File Prior to Encounter   Medication Sig Dispense Refill   • amitriptyline (ELAVIL) 100 MG tablet Take 100 mg by mouth Every Night.     • DULoxetine (CYMBALTA) 30 MG capsule Take  by mouth.     • gabapentin (NEURONTIN) 300 MG capsule Take 300 mg by mouth 3 (three) times a day.     • levETIRAcetam (KEPPRA) 500 MG tablet Take 1,000 mg by mouth 2 (Two) Times a Day.     • venlafaxine (EFFEXOR) 75 MG tablet Take 150 mg by mouth Daily.     • zonisamide (ZONEGRAN) 100 MG capsule Take 1 po qam and 2po qhs 90 capsule 0   • amitriptyline (ELAVIL) 100 MG tablet Take 300 mg by mouth Daily.     • diclofenac (VOLTAREN) 75 MG EC tablet Take 1 tablet by mouth 2 (Two) Times a Day. Take with food 20 tablet 0   • HYDROcodone-acetaminophen (NORCO) 5-325 MG per tablet Take 1 tablet by mouth Every 8 (Eight) Hours As Needed for Severe Pain . 6 tablet 0   • lidocaine (Lidoderm) 5 % Place 1 patch on the skin as directed by provider Daily. Remove & Discard patch within 12 hours or as directed by MD 15 patch 0   • zonisamide (ZONEGRAN) 100 MG capsule Take 200 mg by mouth.       No Known Allergies    Objective    Objective     Vital Signs  Temp:  [97.4 °F (36.3 °C)] 97.4 °F (36.3 °C)  Heart Rate:  [73-96] 73  Resp:  [16] 16  BP: ()/(61-76) 109/70  SpO2:  [94 %-99 %] 99 %  on  Flow (L/min):  [2] 2;   Device (Oxygen Therapy): nasal cannula  Body mass index is 26.63 kg/m².    Physical Exam   Constitutional: She appears well-developed and well-nourished. No distress.   HENT:   Head: Normocephalic.   Eyes: EOM are normal.   Neck: Normal range of motion. No JVD present.   Cardiovascular: Normal rate, regular rhythm and normal heart sounds.   Heart rate 80 during my exam, appears to be normal sinus rhythm.   Pulmonary/Chest:  Effort normal and breath sounds normal.   Patient on 2 L nasal cannula during my exam with O2 sats 99%.  Lung sounds clear.   Abdominal: Soft. Bowel sounds are normal. She exhibits no distension. There is no tenderness.   Musculoskeletal: Normal range of motion.   Neurological: She is alert. She has normal strength.   Patient is minimally responsive at this time, was given Ativan prior to my exam, patient will withdrawal to pain, pupils equal reactive.  She does slightly open eyes to sternal rub, but immediately closes them.   Skin: Skin is warm and dry. Capillary refill takes less than 2 seconds.   Psychiatric:   Patient minimally responsive at this time, only withdrawing to pain.   Nursing note and vitals reviewed.      Results Review:  I reviewed the patient's new clinical results.  I reviewed the patient's new imaging results and agree with the interpretation.  I reviewed the patient's other test results and agree with the interpretation  I personally viewed and interpreted the patient's EKG/Telemetry data  Discussed with ED provider.    Lab Results (last 24 hours)     Procedure Component Value Units Date/Time    CBC & Differential [[id number]] Collected:  07/02/20 1838    Specimen:  Blood Updated:  07/02/20 1918    Narrative:       The following orders were created for panel order CBC & Differential.  Procedure                               Abnormality         Status                     ---------                               -----------         ------                     CBC Auto Differential[[id number]]        Abnormal            Final result                 Please view results for these tests on the individual orders.    Comprehensive Metabolic Panel [[id number]]  (Abnormal) Collected:  07/02/20 1838    Specimen:  Blood Updated:  07/02/20 1937     Glucose 123 mg/dL      BUN 11 mg/dL      Creatinine 0.89 mg/dL      Sodium 139 mmol/L      Potassium 3.3 mmol/L      Chloride 104 mmol/L      CO2 24.4 mmol/L       Calcium 9.1 mg/dL      Total Protein 6.4 g/dL      Albumin 3.70 g/dL      ALT (SGPT) 15 U/L      AST (SGOT) 15 U/L      Alkaline Phosphatase 270 U/L      Total Bilirubin 0.3 mg/dL      eGFR Non African Amer 67 mL/min/1.73      Globulin 2.7 gm/dL      A/G Ratio 1.4 g/dL      BUN/Creatinine Ratio 12.4     Anion Gap 10.6 mmol/L     Narrative:       GFR Normal >60  Chronic Kidney Disease <60  Kidney Failure <15      Troponin [[id number]]  (Normal) Collected:  07/02/20 1838    Specimen:  Blood Updated:  07/02/20 1937     Troponin T <0.010 ng/mL     Narrative:       Troponin T Reference Range:  <= 0.03 ng/mL-   Negative for AMI  >0.03 ng/mL-     Abnormal for myocardial necrosis.  Clinicians would have to utilize clinical acumen, EKG, Troponin and serial changes to determine if it is an Acute Myocardial Infarction or myocardial injury due to an underlying chronic condition.       Results may be falsely decreased if patient taking Biotin.      CBC Auto Differential [[id number]]  (Abnormal) Collected:  07/02/20 1838    Specimen:  Blood Updated:  07/02/20 1918     WBC 7.51 10*3/mm3      RBC 4.70 10*6/mm3      Hemoglobin 13.8 g/dL      Hematocrit 40.9 %      MCV 87.0 fL      MCH 29.4 pg      MCHC 33.7 g/dL      RDW 12.5 %      RDW-SD 39.7 fl      MPV 8.8 fL      Platelets 451 10*3/mm3      Neutrophil % 63.7 %      Lymphocyte % 27.2 %      Monocyte % 6.0 %      Eosinophil % 1.9 %      Basophil % 0.8 %      Immature Grans % 0.4 %      Neutrophils, Absolute 4.79 10*3/mm3      Lymphocytes, Absolute 2.04 10*3/mm3      Monocytes, Absolute 0.45 10*3/mm3      Eosinophils, Absolute 0.14 10*3/mm3      Basophils, Absolute 0.06 10*3/mm3      Immature Grans, Absolute 0.03 10*3/mm3      nRBC 0.1 /100 WBC     Ethanol [[id number]] Collected:  07/02/20 1838    Specimen:  Blood Updated:  07/02/20 2138     Ethanol <10 mg/dL      Ethanol % <0.010 %           Imaging Results (Last 24 Hours)     Procedure Component Value Units Date/Time    CT Head  Without Contrast [[id number]] Collected:  07/02/20 1911     Updated:  07/02/20 1922    Narrative:       CT HEAD WO CONTRAST-     INDICATIONS: Seizures     TECHNIQUE: Radiation dose reduction techniques were utilized, including  automated exposure control and exposure modulation based on body size.  Noncontrast head CT     COMPARISON: 06/16/2020, correlated with MRI from 01/05/2015     FINDINGS:           No acute intracranial hemorrhage, midline shift or mass effect. No acute  territorial infarct is identified.     Moderate to prominent, similar appearing periventricular hypodensities,  again reflecting chronic nonspecific white matter disease in this  patient, as demonstrated on multiple prior exams.     Ventricles, cisterns, cerebral sulci are unremarkable for patient age.     Small likely mucous retention cyst or polyp in the left maxillary sinus.  The visualized paranasal sinuses, orbits, mastoid air cells are  otherwise unremarkable.                   Impression:          No acute intracranial hemorrhage or hydrocephalus. Chronic changes of  the brain. If there is further clinical concern, MRI could be considered  for further evaluation.     This report was finalized on 7/2/2020 7:16 PM by Dr. Tiago Brand M.D.                  ECG 12 Lead   Final Result   HEART RATE= 82  bpm   RR Interval= 732  ms   MN Interval= 156  ms   P Horizontal Axis= -57  deg   P Front Axis= -16  deg   QRSD Interval= 107  ms   QT Interval= 419  ms   QRS Axis= -43  deg   T Wave Axis= 24  deg   - ABNORMAL ECG -   Sinus rhythm   Left anterior fascicular block   Low voltage, precordial leads   Consider anterior infarct   NO SIGNIFICANT CHANGE FROM PREVIOUS ECG   Electronically Signed By: Sonny Alvarez ([facility]) 02-Jul-2020 19:11:25   Date and Time of Study: 2020-07-02 18:57:17           Assessment/Plan     Active Hospital Problems    Diagnosis POA   • Seizures (CMS/[facility]) [R56.9] Yes     Mrs. Chavez is a 49-year-old female with apparent  history of seizure disorder who was brought to the emergency room for seizure.  According to the emergency room provider patient had 2 seizures at home, and was brought to the emergency room per her , patient did have another seizure in the emergency room and was given IV Ativan as well as IV Keppra.    Seizure  -Apparently patient has known seizure disorder and takes Keppra and Zonegran at home, but according to ED report had 3 seizures this afternoon.  Patient was given Ativan prior to my exam, so patient minimally responsive at this time, no family at bedside.  According to the ED provider patient had been compliant with her medications.    -Patient was given IV Keppra in the emergency room, will continue that IV Keppra.  We will also start home meds if patient wakes up and is able to take her p.o. Medications  -Consult neurology  -Seizure precautions  -Continuous pulse ox overnight    Unable to obtain any history from patient, no answer on emergency contact phone number that is in chart.    · I discussed the patient's findings and my recommendations with patient and ED provider.    VTE Prophylaxis - SCDs.  Code Status - Full code.       KACIE Moreno  [facility]  07/02/20  22:03

## 2020-07-03 NOTE — PROGRESS NOTES
"DAILY PROGRESS NOTE  [facility]    Patient Identification:  Name: Mary Chavez  Age: 49 y.o.  Sex: female  :  1970  MRN: [id number]         Primary Care Physician: Provider, No Known    Subjective:  Interval History:She is weak and confused.    Objective:    Scheduled Meds:    amitriptyline 50 mg Oral Nightly   DULoxetine 30 mg Oral Daily   levETIRAcetam 1,000 mg Intravenous Q12H   sodium chloride 10 mL Intravenous Q12H   venlafaxine 150 mg Oral Daily     Continuous Infusions:    sodium chloride 100 mL/hr Last Rate: 100 mL/hr (20 2311)       Vital signs in last 24 hours:  Temp:  [97.3 °F (36.3 °C)-98 °F (36.7 °C)] 98 °F (36.7 °C)  Heart Rate:  [72-96] 86  Resp:  [16] 16  BP: ()/(61-88) 102/62    Intake/Output:    Intake/Output Summary (Last 24 hours) at 7/3/2020 1318  Last data filed at 7/3/2020 0900  Gross per 24 hour   Intake 120 ml   Output --   Net 120 ml       Exam:  /62 (BP Location: Left arm, Patient Position: Lying)   Pulse 86   Temp 98 °F (36.7 °C) (Oral)   Resp 16   Ht 167.6 cm (66\")   Wt 76.7 kg (169 lb 3.2 oz)   SpO2 92%   Breastfeeding No   BMI 27.31 kg/m²     General Appearance:    Alert, cooperative, no distress   Head:    Normocephalic, without obvious abnormality, atraumatic   Eyes:       Throat:   Lips, tongue, gums normal   Neck:   Supple, symmetrical, trachea midline, no JVD   Lungs:     Clear to auscultation bilaterally, respirations unlabored   Chest Wall:    No tenderness or deformity    Heart:    Regular rate and rhythm, S1 and S2 normal, no murmur,no  Rub or gallop   Abdomen:     Soft, nontender, bowel sounds active, no masses, no organomegaly    Extremities:   Extremities normal, atraumatic, no cyanosis or edema   Pulses:      Skin:   Skin is warm and dry,  no rashes or palpable lesions   Neurologic:   no focal deficits noted, confused      Lab Results (last 72 hours)     Procedure Component Value Units Date/Time    Zonisamide Level " [[id number]] Collected:  07/03/20 0855    Specimen:  Blood from Arm, Left Updated:  07/03/20 0901    Comprehensive Metabolic Panel [[id number]]  (Abnormal) Collected:  07/03/20 0447    Specimen:  Blood Updated:  07/03/20 0555     Glucose 93 mg/dL      BUN 11 mg/dL      Creatinine 0.68 mg/dL      Sodium 140 mmol/L      Potassium 3.7 mmol/L      Comment: Specimen hemolyzed.  Results may be affected.        Chloride 106 mmol/L      CO2 25.7 mmol/L      Calcium 8.7 mg/dL      Total Protein 5.8 g/dL      Albumin 3.40 g/dL      ALT (SGPT) 15 U/L      AST (SGOT) 15 U/L      Alkaline Phosphatase 241 U/L      Total Bilirubin 0.4 mg/dL      eGFR Non African Amer 92 mL/min/1.73      Globulin 2.4 gm/dL      A/G Ratio 1.4 g/dL      BUN/Creatinine Ratio 16.2     Anion Gap 8.3 mmol/L     Narrative:       GFR Normal >60  Chronic Kidney Disease <60  Kidney Failure <15      CK [[id number]]  (Normal) Collected:  07/03/20 0447    Specimen:  Blood Updated:  07/03/20 0555     Creatine Kinase 114 U/L     Magnesium [[id number]]  (Normal) Collected:  07/03/20 0447    Specimen:  Blood Updated:  07/03/20 0555     Magnesium 2.3 mg/dL     CBC Auto Differential [[id number]]  (Abnormal) Collected:  07/03/20 0447    Specimen:  Blood Updated:  07/03/20 0526     WBC 7.60 10*3/mm3      RBC 4.52 10*6/mm3      Hemoglobin 13.2 g/dL      Hematocrit 39.0 %      MCV 86.3 fL      MCH 29.2 pg      MCHC 33.8 g/dL      RDW 12.3 %      RDW-SD 38.7 fl      MPV 8.6 fL      Platelets 367 10*3/mm3      Neutrophil % 78.4 %      Lymphocyte % 13.7 %      Monocyte % 5.8 %      Eosinophil % 0.9 %      Basophil % 0.8 %      Immature Grans % 0.4 %      Neutrophils, Absolute 5.96 10*3/mm3      Lymphocytes, Absolute 1.04 10*3/mm3      Monocytes, Absolute 0.44 10*3/mm3      Eosinophils, Absolute 0.07 10*3/mm3      Basophils, Absolute 0.06 10*3/mm3      Immature Grans, Absolute 0.03 10*3/mm3      nRBC 0.0 /100 WBC     Ethanol [[id number]] Collected:  07/02/20 1838    Specimen:   Blood Updated:  07/02/20 2138     Ethanol <10 mg/dL      Ethanol % <0.010 %     [place] Draw [[id number]] Collected:  07/02/20 1838    Specimen:  Blood Updated:  07/02/20 1945    Narrative:       The following orders were created for panel order [place] Draw.  Procedure                               Abnormality         Status                     ---------                               -----------         ------                     Light Blue Top[[id number]]                                   Final result               Green Top (Gel)[[id number]]                                  Final result               Lavender Top[[id number]]                                     Final result               Gold Top - SST[[id number]]                                   Final result                 Please view results for these tests on the individual orders.    Light Blue Top [[id number]] Collected:  07/02/20 1838    Specimen:  Blood Updated:  07/02/20 1945     Extra Tube hold for add-on     Comment: Auto resulted       Green Top (Gel) [[id number]] Collected:  07/02/20 1838    Specimen:  Blood Updated:  07/02/20 1945     Extra Tube Hold for add-ons.     Comment: Auto resulted.       Lavender Top [[id number]] Collected:  07/02/20 1838    Specimen:  Blood Updated:  07/02/20 1945     Extra Tube hold for add-on     Comment: Auto resulted       Gold Top - SST [[id number]] Collected:  07/02/20 1838    Specimen:  Blood Updated:  07/02/20 1945     Extra Tube Hold for add-ons.     Comment: Auto resulted.       Comprehensive Metabolic Panel [[id number]]  (Abnormal) Collected:  07/02/20 1838    Specimen:  Blood Updated:  07/02/20 1937     Glucose 123 mg/dL      BUN 11 mg/dL      Creatinine 0.89 mg/dL      Sodium 139 mmol/L      Potassium 3.3 mmol/L      Chloride 104 mmol/L      CO2 24.4 mmol/L      Calcium 9.1 mg/dL      Total Protein 6.4 g/dL      Albumin 3.70 g/dL      ALT (SGPT) 15 U/L      AST (SGOT) 15 U/L      Alkaline Phosphatase 270 U/L      Total  Bilirubin 0.3 mg/dL      eGFR Non African Amer 67 mL/min/1.73      Globulin 2.7 gm/dL      A/G Ratio 1.4 g/dL      BUN/Creatinine Ratio 12.4     Anion Gap 10.6 mmol/L     Narrative:       GFR Normal >60  Chronic Kidney Disease <60  Kidney Failure <15      Troponin [[id number]]  (Normal) Collected:  07/02/20 1838    Specimen:  Blood Updated:  07/02/20 1937     Troponin T <0.010 ng/mL     Narrative:       Troponin T Reference Range:  <= 0.03 ng/mL-   Negative for AMI  >0.03 ng/mL-     Abnormal for myocardial necrosis.  Clinicians would have to utilize clinical acumen, EKG, Troponin and serial changes to determine if it is an Acute Myocardial Infarction or myocardial injury due to an underlying chronic condition.       Results may be falsely decreased if patient taking Biotin.      CBC & Differential [[id number]] Collected:  07/02/20 1838    Specimen:  Blood Updated:  07/02/20 1918    Narrative:       The following orders were created for panel order CBC & Differential.  Procedure                               Abnormality         Status                     ---------                               -----------         ------                     CBC Auto Differential[[id number]]        Abnormal            Final result                 Please view results for these tests on the individual orders.    CBC Auto Differential [[id number]]  (Abnormal) Collected:  07/02/20 1838    Specimen:  Blood Updated:  07/02/20 1918     WBC 7.51 10*3/mm3      RBC 4.70 10*6/mm3      Hemoglobin 13.8 g/dL      Hematocrit 40.9 %      MCV 87.0 fL      MCH 29.4 pg      MCHC 33.7 g/dL      RDW 12.5 %      RDW-SD 39.7 fl      MPV 8.8 fL      Platelets 451 10*3/mm3      Neutrophil % 63.7 %      Lymphocyte % 27.2 %      Monocyte % 6.0 %      Eosinophil % 1.9 %      Basophil % 0.8 %      Immature Grans % 0.4 %      Neutrophils, Absolute 4.79 10*3/mm3      Lymphocytes, Absolute 2.04 10*3/mm3      Monocytes, Absolute 0.45 10*3/mm3      Eosinophils, Absolute  0.14 10*3/mm3      Basophils, Absolute 0.06 10*3/mm3      Immature Grans, Absolute 0.03 10*3/mm3      nRBC 0.1 /100 WBC         Data Review:  Results from last 7 days   Lab Units 07/03/20  0447 07/02/20  1838   SODIUM mmol/L 140 139   POTASSIUM mmol/L 3.7 3.3*   CHLORIDE mmol/L 106 104   CO2 mmol/L 25.7 24.4   BUN mg/dL 11 11   CREATININE mg/dL 0.68 0.89   GLUCOSE mg/dL 93 123*   CALCIUM mg/dL 8.7 9.1     Results from last 7 days   Lab Units 07/03/20  0447 07/02/20  1838   WBC 10*3/mm3 7.60 7.51   HEMOGLOBIN g/dL 13.2 13.8   HEMATOCRIT % 39.0 40.9   PLATELETS 10*3/mm3 367 451*             Lab Results   Lab Value Date/Time    TROPONINT <0.010 07/02/2020 1838    TROPONINT <0.010 06/16/2020 1035         Results from last 7 days   Lab Units 07/03/20 0447 07/02/20  1838   ALK PHOS U/L 241* 270*   BILIRUBIN mg/dL 0.4 0.3   ALT (SGPT) U/L 15 15   AST (SGOT) U/L 15 15             No results found for: POCGLU        Past Medical History:   Diagnosis Date   • Seizures (CMS/HCC)        Assessment:  Active Hospital Problems    Diagnosis  POA   • **Seizures (CMS/HCC) [R56.9]  Yes   • Hypopotassemia [E87.6]  Unknown      Resolved Hospital Problems   No resolved problems to display.       Plan:  Await neuro consult and continue seizure meds.  Follow lab.    Nakul Barbosa MD  7/3/2020  13:18

## 2020-07-03 NOTE — PLAN OF CARE
Problem: Patient Care Overview  Goal: Plan of Care Review  Outcome: Ongoing (interventions implemented as appropriate)  Flowsheets (Taken 7/3/2020 0555)  Plan of Care Reviewed With: patient  Outcome Summary: Clinical swallow eval completed.  Pt demonstrated no overt s/s aspiration with thin liquids or solids.  Adequate mastication and oral clearance.  REC regular diet, thin liquids.  Meds as tolerated.  Upright with all po.

## 2020-07-03 NOTE — ED NOTES
"Nursing report ED to floor  Mary Chavez  49 y.o.  female    HPI (triage note):   Chief Complaint   Patient presents with   • Seizures       Admitting doctor:   Jl Alfaro MD    Admitting diagnosis:   The encounter diagnosis was Seizures (CMS/HCC).    Code status:   Current Code Status     Date Active Code Status Order ID Comments User Context       7/2/2020 2103 CPR [id number]  Katlin Chery, APRN ED       Questions for Current Code Status     Question Answer Comment    Code Status CPR     Medical Interventions (Level of Support Prior to Arrest) Full           Allergies:   Patient has no known allergies.    Weight:       07/02/20 1828   Weight: 74.8 kg (165 lb)       Most recent vitals:   Vitals:    07/02/20 1817 07/02/20 1828 07/02/20 2000 07/02/20 2130   BP:  116/76 96/61 109/70   BP Location:  Right arm     Patient Position:  Lying     Pulse: 96  76 73   Resp: 16      Temp: 97.4 °F (36.3 °C)      SpO2: 94% 95% 94% 99%   Weight:  74.8 kg (165 lb)     Height:  167.6 cm (66\")         Active LDAs/IV Access:   Lines, Drains & Airways    Active LDAs     Name:   Placement date:   Placement time:   Site:   Days:    Peripheral IV 07/02/20 1838 Right Antecubital   07/02/20 1838    Antecubital   less than 1                Labs (abnormal labs have a star):   Labs Reviewed   COMPREHENSIVE METABOLIC PANEL - Abnormal; Notable for the following components:       Result Value    Glucose 123 (*)     Potassium 3.3 (*)     Alkaline Phosphatase 270 (*)     All other components within normal limits    Narrative:     GFR Normal >60  Chronic Kidney Disease <60  Kidney Failure <15     CBC WITH AUTO DIFFERENTIAL - Abnormal; Notable for the following components:    Platelets 451 (*)     All other components within normal limits   TROPONIN (IN-HOUSE) - Normal    Narrative:     Troponin T Reference Range:  <= 0.03 ng/mL-   Negative for AMI  >0.03 ng/mL-     Abnormal for myocardial necrosis.  Clinicians would have to utilize " clinical acumen, EKG, Troponin and serial changes to determine if it is an Acute Myocardial Infarction or myocardial injury due to an underlying chronic condition.       Results may be falsely decreased if patient taking Biotin.     RAINBOW DRAW    Narrative:     The following orders were created for panel order [place] Draw.  Procedure                               Abnormality         Status                     ---------                               -----------         ------                     Light Blue Top[[id number]]                                   Final result               Green Top (Gel)[[id number]]                                  Final result               Lavender Top[[id number]]                                     Final result               Gold Top - SST[[id number]]                                   Final result                 Please view results for these tests on the individual orders.   ETHANOL   URINE DRUG SCREEN   LIGHT BLUE TOP   GREEN TOP   LAVENDER TOP   GOLD TOP - SST   CBC AND DIFFERENTIAL    Narrative:     The following orders were created for panel order CBC & Differential.  Procedure                               Abnormality         Status                     ---------                               -----------         ------                     CBC Auto Differential[[id number]]        Abnormal            Final result                 Please view results for these tests on the individual orders.       EKG:   ECG 12 Lead   Final Result   HEART RATE= 82  bpm   RR Interval= 732  ms   DE Interval= 156  ms   P Horizontal Axis= -57  deg   P Front Axis= -16  deg   QRSD Interval= 107  ms   QT Interval= 419  ms   QRS Axis= -43  deg   T Wave Axis= 24  deg   - ABNORMAL ECG -   Sinus rhythm   Left anterior fascicular block   Low voltage, precordial leads   Consider anterior infarct   NO SIGNIFICANT CHANGE FROM PREVIOUS ECG   Electronically Signed By: Sonny Alvarez (E) 02-Jul-2020 19:11:25   Date  and Time of Study: 2020-07-02 18:57:17          Meds given in ED:   Medications   sodium chloride 0.9 % flush 10 mL (has no administration in time range)   sodium chloride 0.9 % flush 10 mL (has no administration in time range)   sodium chloride 0.9 % flush 10 mL (has no administration in time range)   nitroglycerin (NITROSTAT) SL tablet 0.4 mg (has no administration in time range)   sodium chloride 0.9 % infusion (has no administration in time range)   acetaminophen (TYLENOL) tablet 650 mg (has no administration in time range)     Or   acetaminophen (TYLENOL) 160 MG/5ML solution 650 mg (has no administration in time range)     Or   acetaminophen (TYLENOL) suppository 650 mg (has no administration in time range)   ondansetron (ZOFRAN) injection 4 mg (has no administration in time range)   LORazepam (ATIVAN) injection 1 mg (1 mg Intravenous Given 7/2/20 1909)   levETIRAcetam in NaCl 0.75% (KEPPRA) IVPB 1,000 mg (0 mg Intravenous Stopped 7/2/20 2110)       Imaging results:  Ct Head Without Contrast    Result Date: 7/2/2020   No acute intracranial hemorrhage or hydrocephalus. Chronic changes of the brain. If there is further clinical concern, MRI could be considered for further evaluation.  This report was finalized on 7/2/2020 7:16 PM by Dr. Tiago Brand M.D.        Ambulatory status:   - bedrest    Social issues:   Social History     Socioeconomic History   • Marital status:      Spouse name: Not on file   • Number of children: Not on file   • Years of education: Not on file   • Highest education level: Not on file   Tobacco Use   • Smoking status: Current Every Day Smoker     Packs/day: 1.50     Types: Cigarettes   Substance and Sexual Activity   • Alcohol use: No   • Drug use: Yes     Types: Marijuana   • Sexual activity: Zev Lovelace RN  07/02/20 1862

## 2020-07-03 NOTE — THERAPY EVALUATION
Acute Care - Speech Language Pathology   Swallow Initial Evaluation [facility]     Patient Name: Mayr Chavez  : 1970  MRN: [id number]  Today's Date: 7/3/2020               Admit Date: 2020    Visit Dx:     ICD-10-CM ICD-9-CM   1. Seizures (CMS/HCC) R56.9 780.39   2. Post-ictal state (CMS/HCC) R56.9 780.39     Patient Active Problem List   Diagnosis   • Seizures (CMS/HCC)   • Hypopotassemia     Past Medical History:   Diagnosis Date   • Seizures (CMS/HCC)      Past Surgical History:   Procedure Laterality Date   • BACK SURGERY     • GASTRIC BYPASS     • GASTRIC BYPASS     • SUBTOTAL HYSTERECTOMY          SWALLOW EVALUATION (last 72 hours)      SLP Adult Swallow Evaluation     Row Name 20 1200                   Rehab Evaluation    Document Type  evaluation  -SA        Subjective Information  no complaints  -SA        Patient Observations  alert;cooperative  -SA        Patient Effort  good  -SA        Symptoms Noted During/After Treatment  none  -SA           General Information    Patient Profile Reviewed  yes  -SA        Pertinent History Of Current Problem  witnessed seizure x2; CT showed nothing acute  -SA        Current Method of Nutrition  regular textures;thin liquids  -SA        Precautions/Limitations, Vision  WFL;for purposes of eval  -SA        Precautions/Limitations, Hearing  WFL;for purposes of eval  -SA        Prior Level of Function-Communication  WFL  -SA        Prior Level of Function-Swallowing  no diet consistency restrictions  -SA        Plans/Goals Discussed with  patient and family;agreed upon  -SA        Barriers to Rehab  none identified  -SA        Patient's Goals for Discharge  patient did not state  -SA        Family Goals for Discharge  family did not state  -SA           Pain Assessment    Additional Documentation  Pain Scale: Numbers Pre/Post-Treatment (Group)  -SA           Pain Scale: Numbers Pre/Post-Treatment    Pain Scale: Numbers, Pretreatment  0/10 - no  pain  -SA        Pain Scale: Numbers, Post-Treatment  0/10 - no pain  -SA           Clinical Swallow Eval    Clinical Swallow Evaluation Summary  Clinical swallow eval completed.  Pt demonstrated no overt s/s aspiration with thin liquids or solids.  Adequate mastication and oral clearance.  REC regular diet, thin liquids.  Meds as tolerated.  Upright with all po.  -SA           Clinical Impression    SLP Swallowing Diagnosis  functional pharyngeal phase  -SA        Functional Impact  no impact on function  -SA        Swallow Criteria for Skilled Therapeutic Interventions Met  no problems identified which require skilled intervention  -SA           Recommendations    Therapy Frequency (Swallow)  evaluation only  -        Predicted Duration Therapy Intervention (Days)  until discharge  -        SLP Diet Recommendation  regular textures;thin liquids  -SA        Recommended Precautions and Strategies  upright posture during/after eating;small bites of food and sips of liquid  -SA        SLP Rec. for Method of Medication Administration  as tolerated  -        Monitor for Signs of Aspiration  notify SLP if any concerns  -        Anticipated Dischage Disposition (SLP)  unknown  -          User Key  (r) = Recorded By, (t) = Taken By, (c) = Cosigned By    Initials Name Effective Dates     Areli Grover MS [facility]-SLP 03/07/18 -           EDUCATION  The patient has been educated in the following areas:   Dysphagia (Swallowing Impairment).    SLP Recommendation and Plan  SLP Swallowing Diagnosis: functional pharyngeal phase  SLP Diet Recommendation: regular textures, thin liquids  Recommended Precautions and Strategies: upright posture during/after eating, small bites of food and sips of liquid  SLP Rec. for Method of Medication Administration: as tolerated     Monitor for Signs of Aspiration: notify SLP if any concerns     Swallow Criteria for Skilled Therapeutic Interventions Met: no problems identified which  require skilled intervention  Anticipated Dischage Disposition (SLP): unknown     Therapy Frequency (Swallow): evaluation only  Predicted Duration Therapy Intervention (Days): until discharge       Plan of Care Reviewed With: patient  Outcome Summary: Clinical swallow eval completed.  Pt demonstrated no overt s/s aspiration with thin liquids or solids.  Adequate mastication and oral clearance.  REC regular diet, thin liquids.  Meds as tolerated.  Upright with all po.         SLP Outcome Measures (last 72 hours)      SLP Outcome Measures     Row Name 07/03/20 1200             SLP Outcome Measures    Outcome Measure Used?  Adult NOMS  -         Adult FCM Scores    FCM Chosen  Swallowing  -      Swallowing FCM Score  7  -        User Key  (r) = Recorded By, (t) = Taken By, (c) = Cosigned By    Initials Name Effective Dates    Areli Olivas MS CCC-SLP 03/07/18 -            Time Calculation:   Time Calculation- SLP     Row Name 07/03/20 1254             Time Calculation- SLP    SLP Start Time  1145  -      SLP Received On  07/03/20  -        User Key  (r) = Recorded By, (t) = Taken By, (c) = Cosigned By    Initials Name Provider Type    Areli Olivas MS CCC-SLP Speech and Language Pathologist          Therapy Charges for Today     Code Description Service Date Service Provider Modifiers Qty    [id number] HC ST EVAL ORAL PHARYNG SWALLOW 4 7/3/2020 Areli Grover MS CCC-LETA GN 1               MS RAMIRO Pulido  7/3/2020

## 2020-07-03 NOTE — PROGRESS NOTES
Discharge Planning Assessment  [facility]     Patient Name: Mary Chavez  MRN: [id number]  Today's Date: 7/3/2020    Admit Date: 7/2/2020    Discharge Needs Assessment     Row Name 07/03/20 1253       Living Environment    Lives With  spouse    Name(s) of Who Lives With Patient  Maxx Chavez [phone]    Current Living Arrangements  home/apartment/condo    Potentially Unsafe Housing Conditions  other (see comments) no concerns    Primary Care Provided by  self    Provides Primary Care For  no one, unable/limited ability to care for self    Family Caregiver if Needed  spouse    Family Caregiver Names  Maxx Chavez [phone]    Quality of Family Relationships  helpful;involved;supportive       Resource/Environmental Concerns    Transportation Concerns  car, none       Transition Planning    Patient/Family Anticipates Transition to  inpatient rehabilitation facility;home with family    Patient/Family Anticipated Services at Transition  skilled nursing;none    Transportation Anticipated  family or friend will provide       Discharge Needs Assessment    Readmission Within the Last 30 Days  no previous admission in last 30 days    Concerns to be Addressed  discharge planning    Equipment Currently Used at Home  none    Anticipated Changes Related to Illness  none    Outpatient/Agency/Support Group Needs  skilled nursing facility    Discharge Facility/Level of Care Needs  nursing facility, skilled    Provided Post Acute Provider List?  N/A    N/A Provider List Comment  Patient's spouse declined list at this time    Current Discharge Risk  cognitively impaired        Discharge Plan     Row Name 07/03/20 1250       Plan    Plan  Home with spouse vs SNF    Provided Post Acute Provider Quality & Resource List?  N/A    N/A Quality & Resource List Comment  Patient's spouse declined ratings at this time    Patient/Family in Agreement with Plan  yes    Plan Comments  CCP called and spoke to patient's spouse Maxx Chavez  [phone] for screen due to patient being confused. CCP role explained and discharge planning discussed. Face sheet verified. Patient has a PCP but spouse is unsure who the provider is. Patient lives with her spouse in a single level home with no steps at the entrance. Patient does not use DME at home and was independent with her ADLs prior to admission. Patient does not have past home health history and [facility], but spouse does not remember which one. Spouse declined HH/SNF list and CMS ratings at this time and stated if patient needs skilled rehab at discharge, he wants referrals placed to any facility that will accept the patient's insurance. Patient's pharmacy is [facility] on [address]. Patient's spouse will transport at discharge pending progress. CCP will follow for hospital course and to place SNF referrals if needed. Toya COOPER    Row Name 07/03/20 1221       Plan    Plan  Undetermined    Plan Comments  CCP called and left voicemail with patient's spouse Maxx Leeck [phone] for screen. Patient is currently confused. CCP will follow up with spouse for screen. Toya Fishman CSW        Destination      Coordination has not been started for this encounter.      Durable Medical Equipment      Coordination has not been started for this encounter.      Dialysis/Infusion      Coordination has not been started for this encounter.      Home Medical Care      Coordination has not been started for this encounter.      Therapy      Coordination has not been started for this encounter.      Community Resources      Coordination has not been started for this encounter.          Demographic Summary     Row Name 07/03/20 1252       General Information    Admission Type  inpatient    Arrived From  emergency department    Referral Source  admission list    Reason for Consult  discharge planning    Preferred Language  English     Used During This Interaction   no        Functional Status     Row Name 07/03/20 1253       Functional Status    Usual Activity Tolerance  good    Current Activity Tolerance  moderate       Functional Status, IADL    Medications  independent    Meal Preparation  independent    Housekeeping  independent    Laundry  independent    Shopping  independent       Mental Status Summary    Recent Changes in Mental Status/Cognitive Functioning  unable to assess       Employment/    Employment Status  disabled        Psychosocial    No documentation.       Abuse/Neglect    No documentation.       Legal    No documentation.       Substance Abuse    No documentation.       Patient Forms    No documentation.           GERRY Kaur

## 2020-07-03 NOTE — PLAN OF CARE
Problem: Patient Care Overview  Goal: Plan of Care Review  Outcome: Ongoing (interventions implemented as appropriate)  Flowsheets  Taken 7/3/2020 1735 by Eunice Dejesus RN  Progress: no change  Outcome Summary: A&O x 3 she knows time,self,situation VSS, patient was extremely anxious and fidgety today. Dr. Lance d/c'd all sedatives and decrease her pain medication to Norco 5-325mg Q4H PRN. She does have her bed alarm on, and I have explained to her several times to call us when she needs to get up. She has repeatedly jumped out of bed to run to the bathroom. will CTM  Taken 7/3/2020 1253 by Areli Grover MS CCC-SLP  Plan of Care Reviewed With: patient     Problem: Fall Risk (Adult)  Goal: Identify Related Risk Factors and Signs and Symptoms  Outcome: Ongoing (interventions implemented as appropriate)  Flowsheets (Taken 7/3/2020 0640 by Delia Albrecht RN)  Related Risk Factors (Fall Risk): age-related changes;fatigue/slow reaction;gait/mobility problems;history of falls;environment unfamiliar;slippery/uneven surfaces;confusion/agitation  Signs and Symptoms (Fall Risk): presence of risk factors     Problem: Seizure Disorder/Epilepsy (Adult)  Goal: Signs and Symptoms of Listed Potential Problems Will be Absent, Minimized or Managed (Seizure Disorder/Epilepsy)  Outcome: Ongoing (interventions implemented as appropriate)  Flowsheets (Taken 7/3/2020 0640 by Delia Albrecht, RN)  Problems Present (Seizure/Epilepsy): injury;situational response     Problem: Skin Injury Risk (Adult)  Goal: Identify Related Risk Factors and Signs and Symptoms  Flowsheets (Taken 7/3/2020 0640 by Delia Albrecht, RN)  Related Risk Factors (Skin Injury Risk): advanced age;mobility impaired

## 2020-07-03 NOTE — PROGRESS NOTES
Continued Stay Note  [facility]     Patient Name: Mary Chavez  MRN: [id number]  Today's Date: 7/3/2020    Admit Date: 7/2/2020    Discharge Plan     Row Name 07/03/20 1221       Plan    Plan  Undetermined    Plan Comments  CCP called and left voicemail with patient's spouse Maxx Chavez [phone] for screen. Patient is currently confused. CCP will follow up with spouse for screen. Toya COOPER        Discharge Codes    No documentation.             GERRY Kaur

## 2020-07-03 NOTE — PLAN OF CARE
Patient admitted this shift from ER. Patient reported to have 2 seizures by  and one witnessed by ER nurse. Patient came to floor responsive only to pain. Patient has abrasions to both knees and was alone so unable to complete swallow study so was NPO. Patient woke up around 0300 and was alert and oriented x4 passed swallow study and was put on regular diet and admission was completed. Patient has had no seizure since admitted to floor. Will continue to monitor.

## 2020-07-04 ENCOUNTER — APPOINTMENT (OUTPATIENT)
Dept: GENERAL RADIOLOGY | Facility: HOSPITAL | Age: 50
End: 2020-07-04

## 2020-07-04 ENCOUNTER — APPOINTMENT (OUTPATIENT)
Dept: CT IMAGING | Facility: HOSPITAL | Age: 50
End: 2020-07-04

## 2020-07-04 LAB
AMPHET+METHAMPHET UR QL: NEGATIVE
ANION GAP SERPL CALCULATED.3IONS-SCNC: 10.4 MMOL/L (ref 5–15)
BARBITURATES UR QL SCN: NEGATIVE
BASOPHILS # BLD AUTO: 0.03 10*3/MM3 (ref 0–0.2)
BASOPHILS NFR BLD AUTO: 0.4 % (ref 0–1.5)
BENZODIAZ UR QL SCN: NEGATIVE
BILIRUB UR QL STRIP: NEGATIVE
BUN SERPL-MCNC: 8 MG/DL (ref 6–20)
BUN/CREAT SERPL: 10 (ref 7–25)
CALCIUM SPEC-SCNC: 9 MG/DL (ref 8.6–10.5)
CANNABINOIDS SERPL QL: NEGATIVE
CHLORIDE SERPL-SCNC: 104 MMOL/L (ref 98–107)
CLARITY UR: CLEAR
CO2 SERPL-SCNC: 22.6 MMOL/L (ref 22–29)
COCAINE UR QL: NEGATIVE
COLOR UR: YELLOW
CREAT SERPL-MCNC: 0.8 MG/DL (ref 0.57–1)
DEPRECATED RDW RBC AUTO: 41.2 FL (ref 37–54)
EOSINOPHIL # BLD AUTO: 0.03 10*3/MM3 (ref 0–0.4)
EOSINOPHIL NFR BLD AUTO: 0.4 % (ref 0.3–6.2)
ERYTHROCYTE [DISTWIDTH] IN BLOOD BY AUTOMATED COUNT: 12.5 % (ref 12.3–15.4)
GFR SERPL CREATININE-BSD FRML MDRD: 76 ML/MIN/1.73
GLUCOSE BLDC GLUCOMTR-MCNC: 100 MG/DL (ref 70–130)
GLUCOSE BLDC GLUCOMTR-MCNC: 95 MG/DL (ref 70–130)
GLUCOSE SERPL-MCNC: 238 MG/DL (ref 65–99)
GLUCOSE UR STRIP-MCNC: ABNORMAL MG/DL
HCT VFR BLD AUTO: 44.5 % (ref 34–46.6)
HGB BLD-MCNC: 14.7 G/DL (ref 12–15.9)
HGB UR QL STRIP.AUTO: NEGATIVE
IMM GRANULOCYTES # BLD AUTO: 0.04 10*3/MM3 (ref 0–0.05)
IMM GRANULOCYTES NFR BLD AUTO: 0.5 % (ref 0–0.5)
KETONES UR QL STRIP: ABNORMAL
LEUKOCYTE ESTERASE UR QL STRIP.AUTO: NEGATIVE
LYMPHOCYTES # BLD AUTO: 0.5 10*3/MM3 (ref 0.7–3.1)
LYMPHOCYTES NFR BLD AUTO: 6.5 % (ref 19.6–45.3)
MCH RBC QN AUTO: 29.3 PG (ref 26.6–33)
MCHC RBC AUTO-ENTMCNC: 33 G/DL (ref 31.5–35.7)
MCV RBC AUTO: 88.8 FL (ref 79–97)
METHADONE UR QL SCN: NEGATIVE
MONOCYTES # BLD AUTO: 0.19 10*3/MM3 (ref 0.1–0.9)
MONOCYTES NFR BLD AUTO: 2.5 % (ref 5–12)
NEUTROPHILS NFR BLD AUTO: 6.96 10*3/MM3 (ref 1.7–7)
NEUTROPHILS NFR BLD AUTO: 89.7 % (ref 42.7–76)
NITRITE UR QL STRIP: NEGATIVE
NRBC BLD AUTO-RTO: 0 /100 WBC (ref 0–0.2)
OPIATES UR QL: NEGATIVE
OXYCODONE UR QL SCN: NEGATIVE
PH UR STRIP.AUTO: 7.5 [PH] (ref 5–8)
PLATELET # BLD AUTO: 359 10*3/MM3 (ref 140–450)
PMV BLD AUTO: 8.6 FL (ref 6–12)
POTASSIUM SERPL-SCNC: 3 MMOL/L (ref 3.5–5.2)
PROT UR QL STRIP: NEGATIVE
RBC # BLD AUTO: 5.01 10*6/MM3 (ref 3.77–5.28)
SODIUM SERPL-SCNC: 137 MMOL/L (ref 136–145)
SP GR UR STRIP: 1.02 (ref 1–1.03)
UROBILINOGEN UR QL STRIP: ABNORMAL
WBC # BLD AUTO: 7.75 10*3/MM3 (ref 3.4–10.8)

## 2020-07-04 PROCEDURE — 25010000002 LORAZEPAM PER 2 MG: Performed by: NURSE PRACTITIONER

## 2020-07-04 PROCEDURE — 80307 DRUG TEST PRSMV CHEM ANLYZR: CPT | Performed by: EMERGENCY MEDICINE

## 2020-07-04 PROCEDURE — 80048 BASIC METABOLIC PNL TOTAL CA: CPT | Performed by: HOSPITALIST

## 2020-07-04 PROCEDURE — 73502 X-RAY EXAM HIP UNI 2-3 VIEWS: CPT

## 2020-07-04 PROCEDURE — 25010000003 LEVETIRACETAM IN NACL 0.75% 1000 MG/100ML SOLUTION: Performed by: PSYCHIATRY & NEUROLOGY

## 2020-07-04 PROCEDURE — 81003 URINALYSIS AUTO W/O SCOPE: CPT | Performed by: HOSPITALIST

## 2020-07-04 PROCEDURE — 0HQ1XZZ REPAIR FACE SKIN, EXTERNAL APPROACH: ICD-10-PCS | Performed by: EMERGENCY MEDICINE

## 2020-07-04 PROCEDURE — 85025 COMPLETE CBC W/AUTO DIFF WBC: CPT | Performed by: HOSPITALIST

## 2020-07-04 PROCEDURE — 70450 CT HEAD/BRAIN W/O DYE: CPT

## 2020-07-04 PROCEDURE — 99231 SBSQ HOSP IP/OBS SF/LOW 25: CPT | Performed by: PSYCHIATRY & NEUROLOGY

## 2020-07-04 PROCEDURE — 25010000002 HALOPERIDOL LACTATE PER 5 MG: Performed by: PSYCHIATRY & NEUROLOGY

## 2020-07-04 PROCEDURE — 94799 UNLISTED PULMONARY SVC/PX: CPT

## 2020-07-04 PROCEDURE — 82962 GLUCOSE BLOOD TEST: CPT

## 2020-07-04 PROCEDURE — 25010000002 MORPHINE PER 10 MG: Performed by: PSYCHIATRY & NEUROLOGY

## 2020-07-04 PROCEDURE — 25010000002 LORAZEPAM PER 2 MG: Performed by: HOSPITALIST

## 2020-07-04 RX ORDER — LIDOCAINE HYDROCHLORIDE 10 MG/ML
5 INJECTION, SOLUTION INFILTRATION; PERINEURAL ONCE
Status: DISCONTINUED | OUTPATIENT
Start: 2020-07-04 | End: 2020-07-04

## 2020-07-04 RX ORDER — LORAZEPAM 1 MG/1
1 TABLET ORAL
Status: DISCONTINUED | OUTPATIENT
Start: 2020-07-04 | End: 2020-07-10 | Stop reason: HOSPADM

## 2020-07-04 RX ORDER — LORAZEPAM 2 MG/ML
2 INJECTION INTRAMUSCULAR
Status: DISCONTINUED | OUTPATIENT
Start: 2020-07-04 | End: 2020-07-10 | Stop reason: HOSPADM

## 2020-07-04 RX ORDER — MORPHINE SULFATE 2 MG/ML
1 INJECTION, SOLUTION INTRAMUSCULAR; INTRAVENOUS EVERY 6 HOURS
Status: DISCONTINUED | OUTPATIENT
Start: 2020-07-04 | End: 2020-07-05

## 2020-07-04 RX ORDER — OLANZAPINE 10 MG/1
5 INJECTION, POWDER, LYOPHILIZED, FOR SOLUTION INTRAMUSCULAR ONCE
Status: COMPLETED | OUTPATIENT
Start: 2020-07-04 | End: 2020-07-04

## 2020-07-04 RX ORDER — LORAZEPAM 1 MG/1
2 TABLET ORAL
Status: DISCONTINUED | OUTPATIENT
Start: 2020-07-04 | End: 2020-07-10 | Stop reason: HOSPADM

## 2020-07-04 RX ORDER — LIDOCAINE HYDROCHLORIDE 10 MG/ML
5 INJECTION, SOLUTION INFILTRATION; PERINEURAL ONCE
Status: DISCONTINUED | OUTPATIENT
Start: 2020-07-04 | End: 2020-07-10 | Stop reason: HOSPADM

## 2020-07-04 RX ORDER — LORAZEPAM 2 MG/ML
1 INJECTION INTRAMUSCULAR
Status: DISCONTINUED | OUTPATIENT
Start: 2020-07-04 | End: 2020-07-10 | Stop reason: HOSPADM

## 2020-07-04 RX ORDER — MORPHINE SULFATE 2 MG/ML
2 INJECTION, SOLUTION INTRAMUSCULAR; INTRAVENOUS ONCE
Status: COMPLETED | OUTPATIENT
Start: 2020-07-04 | End: 2020-07-04

## 2020-07-04 RX ORDER — HALOPERIDOL 5 MG/ML
2 INJECTION INTRAMUSCULAR
Status: DISCONTINUED | OUTPATIENT
Start: 2020-07-04 | End: 2020-07-08

## 2020-07-04 RX ADMIN — LORAZEPAM 1 MG: 2 INJECTION INTRAMUSCULAR; INTRAVENOUS at 11:00

## 2020-07-04 RX ADMIN — SODIUM CHLORIDE 100 ML/HR: 9 INJECTION, SOLUTION INTRAVENOUS at 19:33

## 2020-07-04 RX ADMIN — LORAZEPAM 1 MG: 2 INJECTION INTRAMUSCULAR; INTRAVENOUS at 09:50

## 2020-07-04 RX ADMIN — LORAZEPAM 2 MG: 2 INJECTION INTRAMUSCULAR; INTRAVENOUS at 21:47

## 2020-07-04 RX ADMIN — LORAZEPAM 2 MG: 2 INJECTION INTRAMUSCULAR; INTRAVENOUS at 10:59

## 2020-07-04 RX ADMIN — LORAZEPAM 2 MG: 2 INJECTION INTRAMUSCULAR; INTRAVENOUS at 22:16

## 2020-07-04 RX ADMIN — LORAZEPAM 2 MG: 2 INJECTION INTRAMUSCULAR; INTRAVENOUS at 23:23

## 2020-07-04 RX ADMIN — LORAZEPAM 2 MG: 2 INJECTION INTRAMUSCULAR; INTRAVENOUS at 20:08

## 2020-07-04 RX ADMIN — OLANZAPINE 5 MG: 10 INJECTION, POWDER, LYOPHILIZED, FOR SOLUTION INTRAMUSCULAR at 16:42

## 2020-07-04 RX ADMIN — LEVETIRACETAM 1000 MG: 10 INJECTION INTRAVENOUS at 08:43

## 2020-07-04 RX ADMIN — LORAZEPAM 2 MG: 2 INJECTION INTRAMUSCULAR; INTRAVENOUS at 20:39

## 2020-07-04 RX ADMIN — MORPHINE SULFATE 2 MG: 2 INJECTION, SOLUTION INTRAMUSCULAR; INTRAVENOUS at 16:42

## 2020-07-04 RX ADMIN — SODIUM CHLORIDE, PRESERVATIVE FREE 10 ML: 5 INJECTION INTRAVENOUS at 20:12

## 2020-07-04 RX ADMIN — LORAZEPAM 1 MG: 2 INJECTION INTRAMUSCULAR; INTRAVENOUS at 01:06

## 2020-07-04 RX ADMIN — MORPHINE SULFATE 1 MG: 2 INJECTION, SOLUTION INTRAMUSCULAR; INTRAVENOUS at 20:08

## 2020-07-04 RX ADMIN — SODIUM CHLORIDE 100 ML/HR: 9 INJECTION, SOLUTION INTRAVENOUS at 06:11

## 2020-07-04 RX ADMIN — LORAZEPAM 2 MG: 2 INJECTION INTRAMUSCULAR; INTRAVENOUS at 22:41

## 2020-07-04 RX ADMIN — HALOPERIDOL LACTATE 2 MG: 5 INJECTION, SOLUTION INTRAMUSCULAR at 20:41

## 2020-07-04 NOTE — PROGRESS NOTES
"DAILY PROGRESS NOTE  [facility]    Patient Identification:  Name: Mary Chavez  Age: 49 y.o.  Sex: female  :  1970  MRN: [id number]         Primary Care Physician: Provider, No Known    Subjective:  Interval History:She is weak and confused.  Falling down. Restrained.    Objective:    Scheduled Meds:    DULoxetine 30 mg Oral Daily   levETIRAcetam 1,000 mg Intravenous Q12H   lidocaine 5 mL Intradermal Once   sodium chloride 10 mL Intravenous Q12H   venlafaxine 150 mg Oral Daily     Continuous Infusions:    sodium chloride 100 mL/hr Last Rate: 100 mL/hr (20 0847)       Vital signs in last 24 hours:  Temp:  [98.2 °F (36.8 °C)-98.3 °F (36.8 °C)] 98.3 °F (36.8 °C)  Heart Rate:  [] 106  Resp:  [16-20] 20  BP: (121-139)/(81-90) 139/90    Intake/Output:    Intake/Output Summary (Last 24 hours) at 2020 1405  Last data filed at 2020 1251  Gross per 24 hour   Intake 460 ml   Output --   Net 460 ml       Exam:  /90 (BP Location: Right arm, Patient Position: Lying)   Pulse 106   Temp 98.3 °F (36.8 °C) (Oral)   Resp 20   Ht 167.6 cm (66\")   Wt 76.7 kg (169 lb 3.2 oz)   SpO2 97%   Breastfeeding No   BMI 27.31 kg/m²     General Appearance:    confused, no distress   Head:    Normocephalic, without obvious abnormality, atraumatic   Eyes:       Throat:   Lips, tongue, gums normal   Neck:   Supple, symmetrical, trachea midline, no JVD   Lungs:     Clear to auscultation bilaterally, respirations unlabored   Chest Wall:    No tenderness or deformity    Heart:    Regular rate and rhythm, S1 and S2 normal, no murmur,no  Rub or gallop   Abdomen:     Soft, nontender, bowel sounds active, no masses, no organomegaly    Extremities:   Extremities normal, atraumatic, no cyanosis or edema   Pulses:      Skin:   Skin is warm and dry,  no rashes or palpable lesions   Neurologic:   no focal deficits noted, confused      Lab Results (last 72 hours)     Procedure Component Value Units " Date/Time    Zonisamide Level [[id number]] Collected:  07/03/20 0855    Specimen:  Blood from Arm, Left Updated:  07/03/20 0901    Comprehensive Metabolic Panel [[id number]]  (Abnormal) Collected:  07/03/20 0447    Specimen:  Blood Updated:  07/03/20 0555     Glucose 93 mg/dL      BUN 11 mg/dL      Creatinine 0.68 mg/dL      Sodium 140 mmol/L      Potassium 3.7 mmol/L      Comment: Specimen hemolyzed.  Results may be affected.        Chloride 106 mmol/L      CO2 25.7 mmol/L      Calcium 8.7 mg/dL      Total Protein 5.8 g/dL      Albumin 3.40 g/dL      ALT (SGPT) 15 U/L      AST (SGOT) 15 U/L      Alkaline Phosphatase 241 U/L      Total Bilirubin 0.4 mg/dL      eGFR Non African Amer 92 mL/min/1.73      Globulin 2.4 gm/dL      A/G Ratio 1.4 g/dL      BUN/Creatinine Ratio 16.2     Anion Gap 8.3 mmol/L     Narrative:       GFR Normal >60  Chronic Kidney Disease <60  Kidney Failure <15      CK [[id number]]  (Normal) Collected:  07/03/20 0447    Specimen:  Blood Updated:  07/03/20 0555     Creatine Kinase 114 U/L     Magnesium [[id number]]  (Normal) Collected:  07/03/20 0447    Specimen:  Blood Updated:  07/03/20 0555     Magnesium 2.3 mg/dL     CBC Auto Differential [[id number]]  (Abnormal) Collected:  07/03/20 0447    Specimen:  Blood Updated:  07/03/20 0526     WBC 7.60 10*3/mm3      RBC 4.52 10*6/mm3      Hemoglobin 13.2 g/dL      Hematocrit 39.0 %      MCV 86.3 fL      MCH 29.2 pg      MCHC 33.8 g/dL      RDW 12.3 %      RDW-SD 38.7 fl      MPV 8.6 fL      Platelets 367 10*3/mm3      Neutrophil % 78.4 %      Lymphocyte % 13.7 %      Monocyte % 5.8 %      Eosinophil % 0.9 %      Basophil % 0.8 %      Immature Grans % 0.4 %      Neutrophils, Absolute 5.96 10*3/mm3      Lymphocytes, Absolute 1.04 10*3/mm3      Monocytes, Absolute 0.44 10*3/mm3      Eosinophils, Absolute 0.07 10*3/mm3      Basophils, Absolute 0.06 10*3/mm3      Immature Grans, Absolute 0.03 10*3/mm3      nRBC 0.0 /100 WBC     Ethanol [[id number]] Collected:   07/02/20 1838    Specimen:  Blood Updated:  07/02/20 2138     Ethanol <10 mg/dL      Ethanol % <0.010 %     [place] Draw [[id number]] Collected:  07/02/20 1838    Specimen:  Blood Updated:  07/02/20 1945    Narrative:       The following orders were created for panel order [place] Draw.  Procedure                               Abnormality         Status                     ---------                               -----------         ------                     Light Blue Top[[id number]]                                   Final result               Green Top (Gel)[[id number]]                                  Final result               Lavender Top[[id number]]                                     Final result               Gold Top - SST[[id number]]                                   Final result                 Please view results for these tests on the individual orders.    Light Blue Top [[id number]] Collected:  07/02/20 1838    Specimen:  Blood Updated:  07/02/20 1945     Extra Tube hold for add-on     Comment: Auto resulted       Green Top (Gel) [[id number]] Collected:  07/02/20 1838    Specimen:  Blood Updated:  07/02/20 1945     Extra Tube Hold for add-ons.     Comment: Auto resulted.       Lavender Top [[id number]] Collected:  07/02/20 1838    Specimen:  Blood Updated:  07/02/20 1945     Extra Tube hold for add-on     Comment: Auto resulted       Gold Top - SST [[id number]] Collected:  07/02/20 1838    Specimen:  Blood Updated:  07/02/20 1945     Extra Tube Hold for add-ons.     Comment: Auto resulted.       Comprehensive Metabolic Panel [[id number]]  (Abnormal) Collected:  07/02/20 1838    Specimen:  Blood Updated:  07/02/20 1937     Glucose 123 mg/dL      BUN 11 mg/dL      Creatinine 0.89 mg/dL      Sodium 139 mmol/L      Potassium 3.3 mmol/L      Chloride 104 mmol/L      CO2 24.4 mmol/L      Calcium 9.1 mg/dL      Total Protein 6.4 g/dL      Albumin 3.70 g/dL      ALT (SGPT) 15 U/L      AST (SGOT) 15 U/L      Alkaline  Phosphatase 270 U/L      Total Bilirubin 0.3 mg/dL      eGFR Non African Amer 67 mL/min/1.73      Globulin 2.7 gm/dL      A/G Ratio 1.4 g/dL      BUN/Creatinine Ratio 12.4     Anion Gap 10.6 mmol/L     Narrative:       GFR Normal >60  Chronic Kidney Disease <60  Kidney Failure <15      Troponin [[id number]]  (Normal) Collected:  07/02/20 1838    Specimen:  Blood Updated:  07/02/20 1937     Troponin T <0.010 ng/mL     Narrative:       Troponin T Reference Range:  <= 0.03 ng/mL-   Negative for AMI  >0.03 ng/mL-     Abnormal for myocardial necrosis.  Clinicians would have to utilize clinical acumen, EKG, Troponin and serial changes to determine if it is an Acute Myocardial Infarction or myocardial injury due to an underlying chronic condition.       Results may be falsely decreased if patient taking Biotin.      CBC & Differential [[id number]] Collected:  07/02/20 1838    Specimen:  Blood Updated:  07/02/20 1918    Narrative:       The following orders were created for panel order CBC & Differential.  Procedure                               Abnormality         Status                     ---------                               -----------         ------                     CBC Auto Differential[[id number]]        Abnormal            Final result                 Please view results for these tests on the individual orders.    CBC Auto Differential [[id number]]  (Abnormal) Collected:  07/02/20 1838    Specimen:  Blood Updated:  07/02/20 1918     WBC 7.51 10*3/mm3      RBC 4.70 10*6/mm3      Hemoglobin 13.8 g/dL      Hematocrit 40.9 %      MCV 87.0 fL      MCH 29.4 pg      MCHC 33.7 g/dL      RDW 12.5 %      RDW-SD 39.7 fl      MPV 8.8 fL      Platelets 451 10*3/mm3      Neutrophil % 63.7 %      Lymphocyte % 27.2 %      Monocyte % 6.0 %      Eosinophil % 1.9 %      Basophil % 0.8 %      Immature Grans % 0.4 %      Neutrophils, Absolute 4.79 10*3/mm3      Lymphocytes, Absolute 2.04 10*3/mm3      Monocytes, Absolute 0.45  10*3/mm3      Eosinophils, Absolute 0.14 10*3/mm3      Basophils, Absolute 0.06 10*3/mm3      Immature Grans, Absolute 0.03 10*3/mm3      nRBC 0.1 /100 WBC         Data Review:  Results from last 7 days   Lab Units 07/04/20  0906 07/03/20 0447 07/02/20  1838   SODIUM mmol/L 137 140 139   POTASSIUM mmol/L 3.0* 3.7 3.3*   CHLORIDE mmol/L 104 106 104   CO2 mmol/L 22.6 25.7 24.4   BUN mg/dL 8 11 11   CREATININE mg/dL 0.80 0.68 0.89   GLUCOSE mg/dL 238* 93 123*   CALCIUM mg/dL 9.0 8.7 9.1     Results from last 7 days   Lab Units 07/04/20  0906 07/03/20 0447 07/02/20  1838   WBC 10*3/mm3 7.75 7.60 7.51   HEMOGLOBIN g/dL 14.7 13.2 13.8   HEMATOCRIT % 44.5 39.0 40.9   PLATELETS 10*3/mm3 359 367 451*             Lab Results   Lab Value Date/Time    TROPONINT <0.010 07/02/2020 1838    TROPONINT <0.010 06/16/2020 1035         Results from last 7 days   Lab Units 07/03/20 0447 07/02/20  1838   ALK PHOS U/L 241* 270*   BILIRUBIN mg/dL 0.4 0.3   ALT (SGPT) U/L 15 15   AST (SGOT) U/L 15 15             Glucose   Date/Time Value Ref Range Status   07/04/2020 0246 100 70 - 130 mg/dL Final           Past Medical History:   Diagnosis Date   • Seizures (CMS/HCC)        Assessment:  Active Hospital Problems    Diagnosis  POA   • **Seizures (CMS/HCC) [R56.9]  Yes   • Hypopotassemia [E87.6]  Unknown      Resolved Hospital Problems   No resolved problems to display.       Plan:  neuro consult noted and continue seizure meds.  Follow lab. Restraints and sitter. Supportive care.    Nakul Barbosa MD  7/4/2020  14:05

## 2020-07-04 NOTE — PROGRESS NOTES
Paged by floor nurse, patient had fall while trying to walk to the bathroom, apparently patient to turn her bed alarm off.  Patient did hit head, head laceration over her right eyebrow and reported bruising of her right hip.  Stat CT of head and x-ray of right hip ordered at this time.  I physically went to see patient while she was in CT scan, patient very agitated, not answering questions appropriately, but apparently this is been her baseline, she does appear to be moving everything well, awake and talking, just not making sense.  Patient to have laceration over right sutured by ED provider.  CT scan was suboptimal due to patient motion, but no definite acute intracranial abnormality was identified.  X-ray of her right hip shows mild osteopenia, narrowing of both hip joint spaces, no acute fractures or subluxation.  Patient may need restraints for safety, nurse to reevaluate and call if needed.

## 2020-07-04 NOTE — PLAN OF CARE
Patients vitals stable. Patient reports some pain and PRN medication given. Patient had fall this shift around 0240. Patient got up by herself after turning bed alarm off to go to bathroom and fell. Patient was found on the floor by staff with blood in hair and on the floor and stool on the bed, floor and bed panel where bed alarm is. Patient appears to have a laceration to right side of head and patient had a bruise to right hip. Patients doctor notified and a stat CT of head and xray of right hip was ordered and ER doctor consulted for possible sutures. A safety report, and falls huddle form was completed, patients family was notified and patient was moved to a room closer to nurses station.  Patient later in the shift still attempts to get out of bed without assistance even when instructed to call for assistance. Will continue to monitor

## 2020-07-04 NOTE — PROGRESS NOTES
Patient Identification:  NAME:  Mary Chavez  Age:  49 y.o.   Sex:  female   :  1970   MRN:  [id number]       Chief complaint: metanabolic encephalopathy opiate withdrawal    History of present illness: Patient is a 57-year-old white female who compared to yesterday is more alert but now floridly psychotic talking out of her head in four-point restraints.      Past medical history:  Past Medical History:   Diagnosis Date   • Seizures (CMS/HCC)        Allergies:  Patient has no known allergies.    Home medications:  Medications Prior to Admission   Medication Sig Dispense Refill Last Dose   • amitriptyline (ELAVIL) 100 MG tablet Take 100 mg by mouth Every Night.      • DULoxetine (CYMBALTA) 30 MG capsule Take  by mouth.      • gabapentin (NEURONTIN) 300 MG capsule Take 300 mg by mouth 3 (three) times a day.      • levETIRAcetam (KEPPRA) 500 MG tablet Take 1,000 mg by mouth 2 (Two) Times a Day.      • venlafaxine (EFFEXOR) 75 MG tablet Take 150 mg by mouth Daily.      • zonisamide (ZONEGRAN) 100 MG capsule Take 1 po qam and 2po qhs 90 capsule 0    • amitriptyline (ELAVIL) 100 MG tablet Take 300 mg by mouth Daily.      • diclofenac (VOLTAREN) 75 MG EC tablet Take 1 tablet by mouth 2 (Two) Times a Day. Take with food 20 tablet 0    • HYDROcodone-acetaminophen (NORCO) 5-325 MG per tablet Take 1 tablet by mouth Every 8 (Eight) Hours As Needed for Severe Pain . 6 tablet 0    • lidocaine (Lidoderm) 5 % Place 1 patch on the skin as directed by provider Daily. Remove & Discard patch within 12 hours or as directed by MD 15 patch 0    • zonisamide (ZONEGRAN) 100 MG capsule Take 200 mg by mouth.           Hospital medications:    levETIRAcetam 1,000 mg Intravenous Q12H   lidocaine 5 mL Intradermal Once   Morphine 1 mg Intravenous Q6H   Morphine 2 mg Intravenous Once   OLANZapine 5 mg Intramuscular Once   sodium chloride 10 mL Intravenous Q12H       sodium chloride 100 mL/hr Last Rate: 100 mL/hr (20 0937)     •   haloperidol lactate  •  LORazepam  •  LORazepam **OR** LORazepam **OR** LORazepam **OR** LORazepam **OR** LORazepam **OR** LORazepam  •  nitroglycerin  •  ondansetron  •  potassium chloride **OR** potassium chloride **OR** potassium chloride  •  [COMPLETED] Insert peripheral IV **AND** sodium chloride  •  sodium chloride      Objective:  Vitals Ranges:   Temp:  [98.2 °F (36.8 °C)-98.3 °F (36.8 °C)] 98.3 °F (36.8 °C)  Heart Rate:  [] 106  Resp:  [16-20] 20  BP: (121-139)/(81-90) 139/90      Physical Exam:  She is awake hyper alert and restraining against restraints she is awake talking out of her head normal cranial nerves II through VII no nystagmus she counts fingers appropriately good motor strength in all 4 extremities reflexes trace throughout symmetrical toes downgoing bilaterally neck is supple  Results review:   I reviewed the patient's new clinical results.    Data review:  Lab Results (last 24 hours)     Procedure Component Value Units Date/Time    Basic Metabolic Panel [[id number]]  (Abnormal) Collected:  07/04/20 0906    Specimen:  Blood Updated:  07/04/20 1018     Glucose 238 mg/dL      BUN 8 mg/dL      Creatinine 0.80 mg/dL      Sodium 137 mmol/L      Potassium 3.0 mmol/L      Chloride 104 mmol/L      CO2 22.6 mmol/L      Calcium 9.0 mg/dL      eGFR Non African Amer 76 mL/min/1.73      BUN/Creatinine Ratio 10.0     Anion Gap 10.4 mmol/L     Narrative:       GFR Normal >60  Chronic Kidney Disease <60  Kidney Failure <15      CBC & Differential [[id number]] Collected:  07/04/20 0906    Specimen:  Blood Updated:  07/04/20 0959    Narrative:       The following orders were created for panel order CBC & Differential.  Procedure                               Abnormality         Status                     ---------                               -----------         ------                     CBC Auto Differential[[id number]]        Abnormal            Final result                 Please view results for  these tests on the individual orders.    CBC Auto Differential [[id number]]  (Abnormal) Collected:  07/04/20 0906    Specimen:  Blood Updated:  07/04/20 0959     WBC 7.75 10*3/mm3      RBC 5.01 10*6/mm3      Hemoglobin 14.7 g/dL      Hematocrit 44.5 %      MCV 88.8 fL      MCH 29.3 pg      MCHC 33.0 g/dL      RDW 12.5 %      RDW-SD 41.2 fl      MPV 8.6 fL      Platelets 359 10*3/mm3      Neutrophil % 89.7 %      Lymphocyte % 6.5 %      Monocyte % 2.5 %      Eosinophil % 0.4 %      Basophil % 0.4 %      Immature Grans % 0.5 %      Neutrophils, Absolute 6.96 10*3/mm3      Lymphocytes, Absolute 0.50 10*3/mm3      Monocytes, Absolute 0.19 10*3/mm3      Eosinophils, Absolute 0.03 10*3/mm3      Basophils, Absolute 0.03 10*3/mm3      Immature Grans, Absolute 0.04 10*3/mm3      nRBC 0.0 /100 WBC     POC Glucose Once [[id number]]  (Normal) Collected:  07/04/20 0246    Specimen:  Blood Updated:  07/04/20 0248     Glucose 100 mg/dL            Imaging:  Imaging Results (Last 24 Hours)     Procedure Component Value Units Date/Time    XR Hip With or Without Pelvis 2 - 3 View Right [[id number]] Collected:  07/04/20 0355     Updated:  07/04/20 0359    Narrative:       PELVIS AND RIGHT HIP X-RAYS     CLINICAL HISTORY: Patient fell. Hip pain.     An AP view the pelvis and AP and frog-leg lateral views of the right hip  were obtained. There is mild osteopenia and there is mild narrowing of  both hip joint spaces. No acute fracture or subluxation is identified.     This report was finalized on 7/4/2020 3:56 AM by Dr. Paulino Gregorio M.D.       CT Head Without Contrast [[id number]] Collected:  07/04/20 0328     Updated:  07/04/20 0333    Narrative:       CT HEAD WO CONTRAST-     CLINICAL HISTORY: Patient fell and struck right side of head. Confusion.     TECHNIQUE: Transverse 3 mm thick images were acquired from the base of  the skull to the vertex without IV contrast.     Radiation dose reduction techniques were utilized, including  automated  exposure control and exposure modulation based on body size.     COMPARISON: CT scan of the head dated 07/20/2020.     FINDINGS: The images are significantly degraded due to patient motion.  The brain and ventricular system appear grossly normal. No obvious areas  of hemorrhage are identified. There is no mass effect. Bone window  images demonstrate no evidence of skull fracture. The paranasal sinuses  are grossly well aerated.       Impression:       Suboptimal study due to significant patient motion. No  definite acute intracranial abnormality is identified.     This report was finalized on 7/4/2020 3:30 AM by Dr. Paulino Gregorio M.D.                Assessment and Plan:     She is floridly psychotic in restraint and rambling on about various things that make no sense at all she has no evidence of stroke TIA seizure or central nervous system infection it is now believe that based upon her history that she takes 3 or 4 Norco every day that this could be an opiate withdrawal she did test positive for opiates.  In order to bypass the hepatic first-pass effect I am going to give her a ultra tiny dose of morphine IV 2 mg now then 1 mg scheduled every 6 hours to be held for respiratory compromise or any sedation this will essentially replace the Norco that she is used to taking every day she does not apparently have any evidence of benzo or alcohol withdrawal at this time.  We will use Haldol on a as needed basis which is very QT interval friendly and the benefits outweigh any risks.      Chad Lance MD  07/04/20  15:26

## 2020-07-04 NOTE — ED PROVIDER NOTES
Laceration Repair  Date/Time: 7/4/2020 8:42 AM  Performed by: Mary Bull PA  Authorized by: Mayco Smith MD     Consent:     Consent obtained:  Verbal    Consent given by:  Patient    Risks discussed:  Infection and pain    Alternatives discussed:  Delayed treatment  Anesthesia (see MAR for exact dosages):     Anesthesia method:  Local infiltration    Local anesthetic:  Lidocaine 1% WITH epi  Laceration details:     Location:  Face    Face location:  Forehead    Length (cm):  2  Repair type:     Repair type:  Simple  Pre-procedure details:     Preparation:  Patient was prepped and draped in usual sterile fashion  Exploration:     Wound exploration: entire depth of wound probed and visualized      Contaminated: no    Treatment:     Area cleansed with:  Betadine    Amount of cleaning:  Extensive  Skin repair:     Repair method:  Sutures    Suture size:  6-0    Suture material:  Prolene    Suture technique:  Simple interrupted    Number of sutures:  4  Approximation:     Approximation:  Close  Post-procedure details:     Dressing:  Open (no dressing)    Patient tolerance of procedure:  Tolerated well, no immediate complications    This patient was brought down from the inpatient floor for repair of laceration after a fall.  This was performed in the ED however the patient was already admitted to the floor.       Mary Bull PA  07/04/20 0844

## 2020-07-04 NOTE — NURSING NOTE
Patient unable to stay still enough for CT. She is persistently fighting restraints and trying to get out of bed. Ativan has been given twice with no improvement. Will reassess ability to obtain CT throughout shift.

## 2020-07-04 NOTE — PLAN OF CARE
Problem: Restraint, Nonbehavioral (Nonviolent)  Goal: Rationale and Justification  Outcome: Ongoing (interventions implemented as appropriate)  Flowsheets (Taken 7/4/2020 1139)  Rationale and Justification: prevent line/tube removal; prevent harm to self  Goal: Nonbehavioral (Nonviolent) Restraint: Absence of Injury/Harm  Outcome: Ongoing (interventions implemented as appropriate)  Flowsheets (Taken 7/4/2020 1139)  Nonbehavioral (Nonviolent) Restraint: Absence of Injury/Harm: met  Goal: Nonbehavioral (Nonviolent) Restraint: Achievement of Discontinuation Criteria  Outcome: Ongoing (interventions implemented as appropriate)  Flowsheets (Taken 7/4/2020 1139)  Nonbehavioral (Nonviolent) Restraint: Achievement of Discontinuation Criteria: not met  Goal: Nonbehavioral (Nonviolent) Restraint: Preservation of Dignity and Wellbeing  Outcome: Ongoing (interventions implemented as appropriate)  Flowsheets (Taken 7/4/2020 1139)  Nonbehavioral (Nonviolent) Restraint: Preservation of Dignity and Wellbeing: met

## 2020-07-04 NOTE — CODE DOCUMENTATION
Pt returned to 5N unit. Primary RN @ bedside. Pt was seen by NEIL Dalal NP in CT scan. Agrees that pt needs sutures for her lac. ER PA to go to 5N and suture. Tolerated CT scan of head and Xray of hip.

## 2020-07-04 NOTE — NURSING NOTE
Patient reports that she drinks at least 3 days per week and often drinks heavily. Patient showing signs of withdrawal so Dr. Barbosa notified and WILBERWA and withdrawal protocol ordered. AUDREY 31. Ativan given

## 2020-07-04 NOTE — PLAN OF CARE
Patient remained very confused this morning and CIWA protocol was initiated. Initial CIWA 31. Ativan did not help at all. Around 1100 patient climbed over rail and fell for a second time. Patient subsequently placed in restraints. CT was ordered but unable to obtain due to agitation of patient. IV MSO4 given per neuro to saturate opiate receptors as he believes she is having opiate withdrawal.  reports that she ran out of her prescribed Norco and had been buying it off the street, so obviously she has been taking more than she should or sharing.  also reports an episode several years ago where she was found smearing feces over her bedroom and was found to have a severe UTI. Urine sent for UA and cx. Patient is agitated at the time of this note and received 5mg IM zyprexa x 1. She has IV haldol PRN if needed. VSS.  Hallucinating.   Confused to time, situation, place.

## 2020-07-04 NOTE — NURSING NOTE
Patient climbed out of the bed and immediately fell before staff was able to get into the room. She hit her head when she fell. Dr. Barbosa notified of fall and repeat CT ordered. Restraints ordered.

## 2020-07-04 NOTE — SIGNIFICANT NOTE
07/04/20 1321   Rehab Time/Intention   Evaluation Not Performed other (see comments)  (hold per RN- pt fell this AM and busted head, in restraints, will follow up tomorrow)   Rehab Treatment   Discipline physical therapist   Recommendation   PT - Next Appointment 07/05/20

## 2020-07-05 PROBLEM — F11.93: Status: ACTIVE | Noted: 2020-07-05

## 2020-07-05 LAB
ANION GAP SERPL CALCULATED.3IONS-SCNC: 11.8 MMOL/L (ref 5–15)
BASOPHILS # BLD AUTO: 0.05 10*3/MM3 (ref 0–0.2)
BASOPHILS NFR BLD AUTO: 0.7 % (ref 0–1.5)
BUN SERPL-MCNC: 6 MG/DL (ref 6–20)
BUN/CREAT SERPL: 10.2 (ref 7–25)
CALCIUM SPEC-SCNC: 8.5 MG/DL (ref 8.6–10.5)
CHLORIDE SERPL-SCNC: 106 MMOL/L (ref 98–107)
CO2 SERPL-SCNC: 18.2 MMOL/L (ref 22–29)
CREAT SERPL-MCNC: 0.59 MG/DL (ref 0.57–1)
DEPRECATED RDW RBC AUTO: 37.8 FL (ref 37–54)
EOSINOPHIL # BLD AUTO: 0.08 10*3/MM3 (ref 0–0.4)
EOSINOPHIL NFR BLD AUTO: 1.1 % (ref 0.3–6.2)
ERYTHROCYTE [DISTWIDTH] IN BLOOD BY AUTOMATED COUNT: 12 % (ref 12.3–15.4)
GFR SERPL CREATININE-BSD FRML MDRD: 108 ML/MIN/1.73
GLUCOSE SERPL-MCNC: 108 MG/DL (ref 65–99)
HCT VFR BLD AUTO: 43.8 % (ref 34–46.6)
HGB BLD-MCNC: 15.5 G/DL (ref 12–15.9)
IMM GRANULOCYTES # BLD AUTO: 0.04 10*3/MM3 (ref 0–0.05)
IMM GRANULOCYTES NFR BLD AUTO: 0.5 % (ref 0–0.5)
LYMPHOCYTES # BLD AUTO: 1.05 10*3/MM3 (ref 0.7–3.1)
LYMPHOCYTES NFR BLD AUTO: 13.8 % (ref 19.6–45.3)
MCH RBC QN AUTO: 30.5 PG (ref 26.6–33)
MCHC RBC AUTO-ENTMCNC: 35.4 G/DL (ref 31.5–35.7)
MCV RBC AUTO: 86.1 FL (ref 79–97)
MONOCYTES # BLD AUTO: 0.51 10*3/MM3 (ref 0.1–0.9)
MONOCYTES NFR BLD AUTO: 6.7 % (ref 5–12)
NEUTROPHILS NFR BLD AUTO: 5.87 10*3/MM3 (ref 1.7–7)
NEUTROPHILS NFR BLD AUTO: 77.2 % (ref 42.7–76)
NRBC BLD AUTO-RTO: 0 /100 WBC (ref 0–0.2)
PLATELET # BLD AUTO: 318 10*3/MM3 (ref 140–450)
PMV BLD AUTO: 8.6 FL (ref 6–12)
POTASSIUM SERPL-SCNC: 3.3 MMOL/L (ref 3.5–5.2)
RBC # BLD AUTO: 5.09 10*6/MM3 (ref 3.77–5.28)
SODIUM SERPL-SCNC: 136 MMOL/L (ref 136–145)
WBC # BLD AUTO: 7.6 10*3/MM3 (ref 3.4–10.8)

## 2020-07-05 PROCEDURE — 25010000002 LORAZEPAM PER 2 MG: Performed by: HOSPITALIST

## 2020-07-05 PROCEDURE — 80048 BASIC METABOLIC PNL TOTAL CA: CPT | Performed by: HOSPITALIST

## 2020-07-05 PROCEDURE — 85025 COMPLETE CBC W/AUTO DIFF WBC: CPT | Performed by: HOSPITALIST

## 2020-07-05 PROCEDURE — 25010000003 LEVETIRACETAM IN NACL 0.75% 1000 MG/100ML SOLUTION: Performed by: PSYCHIATRY & NEUROLOGY

## 2020-07-05 PROCEDURE — 25010000002 MORPHINE PER 10 MG: Performed by: PSYCHIATRY & NEUROLOGY

## 2020-07-05 PROCEDURE — 99231 SBSQ HOSP IP/OBS SF/LOW 25: CPT | Performed by: PSYCHIATRY & NEUROLOGY

## 2020-07-05 RX ORDER — MORPHINE SULFATE 2 MG/ML
2 INJECTION, SOLUTION INTRAMUSCULAR; INTRAVENOUS EVERY 6 HOURS
Status: DISCONTINUED | OUTPATIENT
Start: 2020-07-05 | End: 2020-07-06

## 2020-07-05 RX ORDER — OLANZAPINE 5 MG/1
5 TABLET ORAL
Status: DISCONTINUED | OUTPATIENT
Start: 2020-07-05 | End: 2020-07-08

## 2020-07-05 RX ADMIN — LEVETIRACETAM 1000 MG: 10 INJECTION INTRAVENOUS at 09:04

## 2020-07-05 RX ADMIN — MORPHINE SULFATE 1 MG: 2 INJECTION, SOLUTION INTRAMUSCULAR; INTRAVENOUS at 09:04

## 2020-07-05 RX ADMIN — POTASSIUM CHLORIDE 40 MEQ: 10 CAPSULE, COATED, EXTENDED RELEASE ORAL at 14:00

## 2020-07-05 RX ADMIN — LEVETIRACETAM 1000 MG: 10 INJECTION INTRAVENOUS at 20:03

## 2020-07-05 RX ADMIN — POTASSIUM CHLORIDE 40 MEQ: 10 CAPSULE, COATED, EXTENDED RELEASE ORAL at 17:49

## 2020-07-05 RX ADMIN — SODIUM CHLORIDE 100 ML/HR: 9 INJECTION, SOLUTION INTRAVENOUS at 00:05

## 2020-07-05 RX ADMIN — MORPHINE SULFATE 2 MG: 2 INJECTION, SOLUTION INTRAMUSCULAR; INTRAVENOUS at 19:54

## 2020-07-05 RX ADMIN — OLANZAPINE 5 MG: 5 TABLET ORAL at 17:49

## 2020-07-05 RX ADMIN — SODIUM CHLORIDE, PRESERVATIVE FREE 10 ML: 5 INJECTION INTRAVENOUS at 20:03

## 2020-07-05 RX ADMIN — MORPHINE SULFATE 2 MG: 2 INJECTION, SOLUTION INTRAMUSCULAR; INTRAVENOUS at 14:00

## 2020-07-05 RX ADMIN — LORAZEPAM 2 MG: 2 INJECTION INTRAMUSCULAR; INTRAVENOUS at 00:06

## 2020-07-05 NOTE — PROGRESS NOTES
Patient Identification:  NAME:  Mary Chavez  Age:  49 y.o.   Sex:  female   :  1970   MRN:  [id number]       Chief complaint: Opiate withdrawal metabolic encephalopathy seizure  History of present illness: Additional history comes in now that she recently ran out of the Norco and has had to be buying it on the street  She has been very very rambunctious in the last 24 hours fell out of bed hit her head with some bruising but CT of the head by my independent eyeball review shows no intracranial hemorrhage she is fine while she sitting there getting her hair done awake pretty alert and conversing with me.  When I asked her about why she needs pain medicine she states it is her knees and legs.      Past medical history:  Past Medical History:   Diagnosis Date   • Seizures (CMS/[facility])        Allergies:  Patient has no known allergies.    Home medications:  Medications Prior to Admission   Medication Sig Dispense Refill Last Dose   • amitriptyline (ELAVIL) 100 MG tablet Take 100 mg by mouth Every Night.      • DULoxetine (CYMBALTA) 30 MG capsule Take  by mouth.      • gabapentin (NEURONTIN) 300 MG capsule Take 300 mg by mouth 3 (three) times a day.      • levETIRAcetam (KEPPRA) 500 MG tablet Take 1,000 mg by mouth 2 (Two) Times a Day.      • venlafaxine (EFFEXOR) 75 MG tablet Take 150 mg by mouth Daily.      • zonisamide (ZONEGRAN) 100 MG capsule Take 1 po qam and 2po qhs 90 capsule 0    • amitriptyline (ELAVIL) 100 MG tablet Take 300 mg by mouth Daily.      • diclofenac (VOLTAREN) 75 MG EC tablet Take 1 tablet by mouth 2 (Two) Times a Day. Take with food 20 tablet 0    • HYDROcodone-acetaminophen (NORCO) 5-325 MG per tablet Take 1 tablet by mouth Every 8 (Eight) Hours As Needed for Severe Pain . 6 tablet 0    • lidocaine (Lidoderm) 5 % Place 1 patch on the skin as directed by provider Daily. Remove & Discard patch within 12 hours or as directed by MD 15 patch 0    • zonisamide (ZONEGRAN) 100 MG capsule Take  200 mg by mouth.           Hospital medications:    levETIRAcetam 1,000 mg Intravenous Q12H   lidocaine 5 mL Intradermal Once   Morphine 2 mg Intravenous Q6H   OLANZapine 5 mg Oral Daily Before Supper   sodium chloride 10 mL Intravenous Q12H       sodium chloride 100 mL/hr Last Rate: 100 mL/hr (07/05/20 0005)     •  haloperidol lactate  •  LORazepam  •  LORazepam **OR** LORazepam **OR** LORazepam **OR** LORazepam **OR** LORazepam **OR** LORazepam  •  nitroglycerin  •  ondansetron  •  potassium chloride **OR** potassium chloride **OR** potassium chloride  •  [COMPLETED] Insert peripheral IV **AND** sodium chloride  •  sodium chloride      Objective:  Vitals Ranges:   Temp:  [97.4 °F (36.3 °C)-98.1 °F (36.7 °C)] 97.8 °F (36.6 °C)  Heart Rate:  [] 86  Resp:  [14-22] 14  BP: (130-156)/() 142/95      Physical Exam:  She is awake a bit lethargic but sitting up in bed smiling at me and joking around she is getting her hair done she is obviously no shortness of breath.  Normal cranial nerves II through VII she is able to count fingers and states that she is at [facility] and then she corrects that is [facility] reflexes trace throughout symmetrical toes downgoing bilaterally    Results review:   I reviewed the patient's new clinical results.    Data review:  Lab Results (last 24 hours)     Procedure Component Value Units Date/Time    CBC & Differential [[id number]] Collected:  07/05/20 1004    Specimen:  Blood Updated:  07/05/20 1200    Narrative:       The following orders were created for panel order CBC & Differential.  Procedure                               Abnormality         Status                     ---------                               -----------         ------                     CBC Auto Differential[[id number]]        Abnormal            Final result                 Please view results for these tests on the individual orders.    CBC Auto Differential [[id number]]  (Abnormal)  Collected:  07/05/20 1004    Specimen:  Blood Updated:  07/05/20 1200     WBC 7.60 10*3/mm3      RBC 5.09 10*6/mm3      Hemoglobin 15.5 g/dL      Hematocrit 43.8 %      MCV 86.1 fL      MCH 30.5 pg      MCHC 35.4 g/dL      RDW 12.0 %      RDW-SD 37.8 fl      MPV 8.6 fL      Platelets 318 10*3/mm3      Neutrophil % 77.2 %      Lymphocyte % 13.8 %      Monocyte % 6.7 %      Eosinophil % 1.1 %      Basophil % 0.7 %      Immature Grans % 0.5 %      Neutrophils, Absolute 5.87 10*3/mm3      Lymphocytes, Absolute 1.05 10*3/mm3      Monocytes, Absolute 0.51 10*3/mm3      Eosinophils, Absolute 0.08 10*3/mm3      Basophils, Absolute 0.05 10*3/mm3      Immature Grans, Absolute 0.04 10*3/mm3      nRBC 0.0 /100 WBC     Basic Metabolic Panel [[id number]]  (Abnormal) Collected:  07/05/20 1004    Specimen:  Blood Updated:  07/05/20 1103     Glucose 108 mg/dL      BUN 6 mg/dL      Creatinine 0.59 mg/dL      Sodium 136 mmol/L      Potassium 3.3 mmol/L      Chloride 106 mmol/L      CO2 18.2 mmol/L      Calcium 8.5 mg/dL      eGFR Non African Amer 108 mL/min/1.73      BUN/Creatinine Ratio 10.2     Anion Gap 11.8 mmol/L     Narrative:       GFR Normal >60  Chronic Kidney Disease <60  Kidney Failure <15      POC Glucose Once [[id number]]  (Normal) Collected:  07/04/20 2130    Specimen:  Blood Updated:  07/04/20 2132     Glucose 95 mg/dL     Urine Drug Screen - Urine, Clean Catch [[id number]]  (Normal) Collected:  07/04/20 1701    Specimen:  Urine, Clean Catch Updated:  07/04/20 1803     Amphet/Methamphet, Screen Negative     Barbiturates Screen, Urine Negative     Benzodiazepine Screen, Urine Negative     Cocaine Screen, Urine Negative     Opiate Screen Negative     THC, Screen, Urine Negative     Methadone Screen, Urine Negative     Oxycodone Screen, Urine Negative    Narrative:       Negative Thresholds For Drugs Screened:     Amphetamines               500 ng/ml   Barbiturates               200 ng/ml   Benzodiazepines            100  "ng/ml   Cocaine                    300 ng/ml   Methadone                  300 ng/ml   Opiates                    300 ng/ml   Oxycodone                  100 ng/ml   THC                        50 ng/ml    The Normal Value for all drugs tested is negative. This report includes final unconfirmed screening results to be used for medical treatment purposes only. Unconfirmed results must not be used for non-medical purposes such as employment or legal testing. Clinical consideration should be applied to any drug of abuse test, particulary when unconfirmed results are used.    Urinalysis With Culture If Indicated - Urine, Clean Catch [[id number]]  (Abnormal) Collected:  07/04/20 1702    Specimen:  Urine, Clean Catch Updated:  07/04/20 1738     Color, UA Yellow     Appearance, UA Clear     pH, UA 7.5     Specific Gravity, UA 1.020     Glucose,  mg/dL (2+)     Ketones, UA 15 mg/dL (1+)     Bilirubin, UA Negative     Blood, UA Negative     Protein, UA Negative     Leuk Esterase, UA Negative     Nitrite, UA Negative     Urobilinogen, UA 1.0 E.U./dL    Narrative:       Urine microscopic not indicated.           Imaging:  Imaging Results (Last 24 Hours)     ** No results found for the last 24 hours. **             Assessment and Plan:     Further history has come in now that she ran out of the Norco at home and has been buying it off of the street.  This makes sense now as she is having opiate withdrawal.  She is a smoker but denies being a significant drinker I am okay with the Ativan but will increase the morphine to 2 mg IV every 6 hours of course we will continue to monitor respirations and it would be held during sleep.  Nonetheless this patient has been taking a LOT of Norco and probably similar agents and I believe this IV dose of morphine is definitely appropriate and still \"low-dose\" compared to what her system is used to receiving on a daily basis illegally.        Chad Lance MD  07/05/20  12:04  "

## 2020-07-05 NOTE — PLAN OF CARE
Problem: Patient Care Overview  Goal: Plan of Care Review  Outcome: Ongoing (interventions implemented as appropriate)  Flowsheets (Taken 7/5/2020 0457)  Progress: declining  Plan of Care Reviewed With: patient  Outcome Summary: pt with posey vest & julee soft wrist restraints & 1:1 sitter for safety, pt had 2 falls within few hrs of one another yesterday with injury, pulls at lines/tubes,removal of equipment, impulsive behavior, confusion, and withdrawl to possible opiates, benzo's, and/or etoh; access consulted, new PIV placed by IV RN last pm; pt very restless, anxious, agitated, impulsive, confusion, for first half of shift, pt CIWA max 26 & COWS max 14; pt receiving approx every 15-20 or 30min IV ativan 2mg per protocol, haldol x1, scheduled morphine all which were ineffective based on scores, Rapid Response called around 2125 after s/w APRN on call due to CIWA continued to elevated and at 26; after RR team on floor and d/w team of current hx known, s/s this shift, falls, medications given ; pt with intermittent approx 3-5mins of sleeping then arouses very restless, however, VSS, sats remain % RA, will continue to administer Ativan as ordered per protocol with monitoring of VS, if any changes will consult again; pt went to sleep around 0130 and has been sleeping majority of the time since with intermittent awakenings with restlessness; cont safety precautions, 1:1 sitter, and restraints; no sz activity, sz precautions ongoing; NSR on monitor.     Problem: Fall Risk (Adult)  Goal: Absence of Fall  Outcome: Ongoing (interventions implemented as appropriate)  Flowsheets (Taken 7/5/2020 0457)  Absence of Fall: making progress toward outcome     Problem: Skin Injury Risk (Adult)  Goal: Skin Health and Integrity  Outcome: Ongoing (interventions implemented as appropriate)  Flowsheets (Taken 7/5/2020 0457)  Skin Health and Integrity: making progress toward outcome     Problem: Seizure Disorder/Epilepsy  (Adult)  Goal: Signs and Symptoms of Listed Potential Problems Will be Absent, Minimized or Managed (Seizure Disorder/Epilepsy)  Outcome: Ongoing (interventions implemented as appropriate)  Flowsheets (Taken 7/5/2020 0457)  Problems Assessed (Seizure Disorder/Epilepsy): situational response; antiepileptic drug side effects/toxicity; hypoxia/hypoxemia; prolonged seizure/status epilepticus  Problems Present (Seizure/Epilepsy): situational response     Problem: Restraint, Nonbehavioral (Nonviolent)  Goal: Nonbehavioral (Nonviolent) Restraint: Absence of Injury/Harm  Outcome: Ongoing (interventions implemented as appropriate)  Flowsheets (Taken 7/5/2020 0457)  Nonbehavioral (Nonviolent) Restraint: Absence of Injury/Harm: not met  Goal: Nonbehavioral (Nonviolent) Restraint: Achievement of Discontinuation Criteria  Outcome: Ongoing (interventions implemented as appropriate)  Flowsheets (Taken 7/5/2020 1717)  Nonbehavioral (Nonviolent) Restraint: Achievement of Discontinuation Criteria: not met  Goal: Nonbehavioral (Nonviolent) Restraint: Preservation of Dignity and Wellbeing  Outcome: Ongoing (interventions implemented as appropriate)  Flowsheets (Taken 7/5/2020 3691)  Nonbehavioral (Nonviolent) Restraint: Preservation of Dignity and Wellbeing: not met     Problem: Opioid Dependence/Withdrawal (Adult)  Goal: Withdrawal Symptoms Managed or Absent  Outcome: Ongoing (interventions implemented as appropriate)  Flowsheets (Taken 7/5/2020 3387)  Withdrawal Symptoms Managed or Absent: making progress toward outcome  Goal: Psychosocial Wellbeing  Outcome: Ongoing (interventions implemented as appropriate)  Flowsheets (Taken 7/5/2020 1807)  Psychosocial Wellbeing: making progress toward outcome     Problem: Alcohol Withdrawal Acute, Risk/Actual (Adult)  Goal: Signs and Symptoms of Listed Potential Problems Will be Absent, Minimized or Managed (Alcohol Withdrawal Acute, Risk/Actual)  Outcome: Ongoing (interventions implemented as  appropriate)  Flowsheets (Taken 7/5/2020 5558)  Problems Assessed (Alcohol Withdrawal Syndrome): effects of ERICA (alcohol withdrawal syndrome); situational response  Problems Present (Alcohol W/D Syndrome): effects of ERICA (alcohol withdrawal syndrome); situational response

## 2020-07-05 NOTE — PLAN OF CARE
Alert, oriented to self, room air, remains in posey and bilateral wrist restraints with sitter to bedside, agitated, pulls at lines, impulsive, tries to get OOB, no seizures this shift, SR on monitor, will CTM

## 2020-07-05 NOTE — NURSING NOTE
Access Center consulted regarding metabolic encephalopathy related to opiate, alcohol, or benzo withdrawal.  Patient is currently in both a posey and bilateral wrist restraints.  She is labile, tangential, rambles.  Stated date as July, July, July 4th no 5th and the year as 220.  She is able to state name of hospital but is confused to situation and does not know why she is here.  She rambles on about her son and his girlfriend.  Unable to concentrate and answer further questions.  Per chart patient appears to be going through opiate withdrawal.      Access will return in hopes of completing evaluation when patient is more appropriate and clear.

## 2020-07-05 NOTE — SIGNIFICANT NOTE
07/05/20 1315   Rehab Time/Intention   Evaluation Not Performed other (see comments)  (Spoke with RN Kathy who states pt is not appropriate at this time. Pt remains confused, agitated, and in restraints. PT will f/u tomorrow.)   Rehab Treatment   Discipline physical therapist   Recommendation   PT - Next Appointment 07/06/20

## 2020-07-05 NOTE — PROGRESS NOTES
"DAILY PROGRESS NOTE  [facility]    Patient Identification:  Name: Mary Chavez  Age: 49 y.o.  Sex: female  :  1970  MRN: [id number]         Primary Care Physician: Provider, No Known    Subjective:  Interval History:She is weak and confused.  Falling down. Restrained.    Objective:    Scheduled Meds:    levETIRAcetam 1,000 mg Intravenous Q12H   lidocaine 5 mL Intradermal Once   Morphine 2 mg Intravenous Q6H   OLANZapine 5 mg Oral Daily Before Supper   sodium chloride 10 mL Intravenous Q12H     Continuous Infusions:    sodium chloride 100 mL/hr Last Rate: 100 mL/hr (20 0005)       Vital signs in last 24 hours:  Temp:  [97.4 °F (36.3 °C)-98.1 °F (36.7 °C)] 97.8 °F (36.6 °C)  Heart Rate:  [77-97] 86  Resp:  [14-20] 14  BP: (130-156)/() 142/95    Intake/Output:    Intake/Output Summary (Last 24 hours) at 2020 1449  Last data filed at 2020 1943  Gross per 24 hour   Intake 130 ml   Output --   Net 130 ml       Exam:  /95 (BP Location: Right arm, Patient Position: Lying)   Pulse 86   Temp 97.8 °F (36.6 °C) (Oral)   Resp 14   Ht 167.6 cm (66\")   Wt 76.7 kg (169 lb 3.2 oz)   SpO2 100%   Breastfeeding No   BMI 27.31 kg/m²     General Appearance:    confused, no distress   Head:    Normocephalic, without obvious abnormality, atraumatic   Eyes:       Throat:   Lips, tongue, gums normal   Neck:   Supple, symmetrical, trachea midline, no JVD   Lungs:     Clear to auscultation bilaterally, respirations unlabored   Chest Wall:    No tenderness or deformity    Heart:    Regular rate and rhythm, S1 and S2 normal, no murmur,no  Rub or gallop   Abdomen:     Soft, nontender, bowel sounds active, no masses, no organomegaly    Extremities:   Extremities normal, atraumatic, no cyanosis or edema   Pulses:      Skin:   Skin is warm and dry,  no rashes or palpable lesions   Neurologic:   no focal deficits noted, confused      Lab Results (last 72 hours)     Procedure Component Value " Units Date/Time    Zonisamide Level [[id number]] Collected:  07/03/20 0855    Specimen:  Blood from Arm, Left Updated:  07/03/20 0901    Comprehensive Metabolic Panel [[id number]]  (Abnormal) Collected:  07/03/20 0447    Specimen:  Blood Updated:  07/03/20 0555     Glucose 93 mg/dL      BUN 11 mg/dL      Creatinine 0.68 mg/dL      Sodium 140 mmol/L      Potassium 3.7 mmol/L      Comment: Specimen hemolyzed.  Results may be affected.        Chloride 106 mmol/L      CO2 25.7 mmol/L      Calcium 8.7 mg/dL      Total Protein 5.8 g/dL      Albumin 3.40 g/dL      ALT (SGPT) 15 U/L      AST (SGOT) 15 U/L      Alkaline Phosphatase 241 U/L      Total Bilirubin 0.4 mg/dL      eGFR Non African Amer 92 mL/min/1.73      Globulin 2.4 gm/dL      A/G Ratio 1.4 g/dL      BUN/Creatinine Ratio 16.2     Anion Gap 8.3 mmol/L     Narrative:       GFR Normal >60  Chronic Kidney Disease <60  Kidney Failure <15      CK [[id number]]  (Normal) Collected:  07/03/20 0447    Specimen:  Blood Updated:  07/03/20 0555     Creatine Kinase 114 U/L     Magnesium [[id number]]  (Normal) Collected:  07/03/20 0447    Specimen:  Blood Updated:  07/03/20 0555     Magnesium 2.3 mg/dL     CBC Auto Differential [[id number]]  (Abnormal) Collected:  07/03/20 0447    Specimen:  Blood Updated:  07/03/20 0526     WBC 7.60 10*3/mm3      RBC 4.52 10*6/mm3      Hemoglobin 13.2 g/dL      Hematocrit 39.0 %      MCV 86.3 fL      MCH 29.2 pg      MCHC 33.8 g/dL      RDW 12.3 %      RDW-SD 38.7 fl      MPV 8.6 fL      Platelets 367 10*3/mm3      Neutrophil % 78.4 %      Lymphocyte % 13.7 %      Monocyte % 5.8 %      Eosinophil % 0.9 %      Basophil % 0.8 %      Immature Grans % 0.4 %      Neutrophils, Absolute 5.96 10*3/mm3      Lymphocytes, Absolute 1.04 10*3/mm3      Monocytes, Absolute 0.44 10*3/mm3      Eosinophils, Absolute 0.07 10*3/mm3      Basophils, Absolute 0.06 10*3/mm3      Immature Grans, Absolute 0.03 10*3/mm3      nRBC 0.0 /100 WBC     Ethanol [[id number]]  Collected:  07/02/20 1838    Specimen:  Blood Updated:  07/02/20 2138     Ethanol <10 mg/dL      Ethanol % <0.010 %     [place] Draw [[id number]] Collected:  07/02/20 1838    Specimen:  Blood Updated:  07/02/20 1945    Narrative:       The following orders were created for panel order [place] Draw.  Procedure                               Abnormality         Status                     ---------                               -----------         ------                     Light Blue Top[[id number]]                                   Final result               Green Top (Gel)[[id number]]                                  Final result               Lavender Top[[id number]]                                     Final result               Gold Top - SST[[id number]]                                   Final result                 Please view results for these tests on the individual orders.    Light Blue Top [[id number]] Collected:  07/02/20 1838    Specimen:  Blood Updated:  07/02/20 1945     Extra Tube hold for add-on     Comment: Auto resulted       Green Top (Gel) [[id number]] Collected:  07/02/20 1838    Specimen:  Blood Updated:  07/02/20 1945     Extra Tube Hold for add-ons.     Comment: Auto resulted.       Lavender Top [[id number]] Collected:  07/02/20 1838    Specimen:  Blood Updated:  07/02/20 1945     Extra Tube hold for add-on     Comment: Auto resulted       Gold Top - SST [[id number]] Collected:  07/02/20 1838    Specimen:  Blood Updated:  07/02/20 1945     Extra Tube Hold for add-ons.     Comment: Auto resulted.       Comprehensive Metabolic Panel [[id number]]  (Abnormal) Collected:  07/02/20 1838    Specimen:  Blood Updated:  07/02/20 1937     Glucose 123 mg/dL      BUN 11 mg/dL      Creatinine 0.89 mg/dL      Sodium 139 mmol/L      Potassium 3.3 mmol/L      Chloride 104 mmol/L      CO2 24.4 mmol/L      Calcium 9.1 mg/dL      Total Protein 6.4 g/dL      Albumin 3.70 g/dL      ALT (SGPT) 15 U/L      AST (SGOT) 15 U/L       Alkaline Phosphatase 270 U/L      Total Bilirubin 0.3 mg/dL      eGFR Non African Amer 67 mL/min/1.73      Globulin 2.7 gm/dL      A/G Ratio 1.4 g/dL      BUN/Creatinine Ratio 12.4     Anion Gap 10.6 mmol/L     Narrative:       GFR Normal >60  Chronic Kidney Disease <60  Kidney Failure <15      Troponin [[id number]]  (Normal) Collected:  07/02/20 1838    Specimen:  Blood Updated:  07/02/20 1937     Troponin T <0.010 ng/mL     Narrative:       Troponin T Reference Range:  <= 0.03 ng/mL-   Negative for AMI  >0.03 ng/mL-     Abnormal for myocardial necrosis.  Clinicians would have to utilize clinical acumen, EKG, Troponin and serial changes to determine if it is an Acute Myocardial Infarction or myocardial injury due to an underlying chronic condition.       Results may be falsely decreased if patient taking Biotin.      CBC & Differential [[id number]] Collected:  07/02/20 1838    Specimen:  Blood Updated:  07/02/20 1918    Narrative:       The following orders were created for panel order CBC & Differential.  Procedure                               Abnormality         Status                     ---------                               -----------         ------                     CBC Auto Differential[[id number]]        Abnormal            Final result                 Please view results for these tests on the individual orders.    CBC Auto Differential [[id number]]  (Abnormal) Collected:  07/02/20 1838    Specimen:  Blood Updated:  07/02/20 1918     WBC 7.51 10*3/mm3      RBC 4.70 10*6/mm3      Hemoglobin 13.8 g/dL      Hematocrit 40.9 %      MCV 87.0 fL      MCH 29.4 pg      MCHC 33.7 g/dL      RDW 12.5 %      RDW-SD 39.7 fl      MPV 8.8 fL      Platelets 451 10*3/mm3      Neutrophil % 63.7 %      Lymphocyte % 27.2 %      Monocyte % 6.0 %      Eosinophil % 1.9 %      Basophil % 0.8 %      Immature Grans % 0.4 %      Neutrophils, Absolute 4.79 10*3/mm3      Lymphocytes, Absolute 2.04 10*3/mm3      Monocytes, Absolute  0.45 10*3/mm3      Eosinophils, Absolute 0.14 10*3/mm3      Basophils, Absolute 0.06 10*3/mm3      Immature Grans, Absolute 0.03 10*3/mm3      nRBC 0.1 /100 WBC         Data Review:  Results from last 7 days   Lab Units 07/05/20  1004 07/04/20  0906 07/03/20  0447   SODIUM mmol/L 136 137 140   POTASSIUM mmol/L 3.3* 3.0* 3.7   CHLORIDE mmol/L 106 104 106   CO2 mmol/L 18.2* 22.6 25.7   BUN mg/dL 6 8 11   CREATININE mg/dL 0.59 0.80 0.68   GLUCOSE mg/dL 108* 238* 93   CALCIUM mg/dL 8.5* 9.0 8.7     Results from last 7 days   Lab Units 07/05/20  1004 07/04/20  0906 07/03/20  0447   WBC 10*3/mm3 7.60 7.75 7.60   HEMOGLOBIN g/dL 15.5 14.7 13.2   HEMATOCRIT % 43.8 44.5 39.0   PLATELETS 10*3/mm3 318 359 367             Lab Results   Lab Value Date/Time    TROPONINT <0.010 07/02/2020 1838    TROPONINT <0.010 06/16/2020 1035         Results from last 7 days   Lab Units 07/03/20  0447 07/02/20  1838   ALK PHOS U/L 241* 270*   BILIRUBIN mg/dL 0.4 0.3   ALT (SGPT) U/L 15 15   AST (SGOT) U/L 15 15             Glucose   Date/Time Value Ref Range Status   07/04/2020 2130 95 70 - 130 mg/dL Final   07/04/2020 0246 100 70 - 130 mg/dL Final           Past Medical History:   Diagnosis Date   • Seizures (CMS/HCC)        Assessment:  Active Hospital Problems    Diagnosis  POA   • **Seizures (CMS/HCC) [R56.9]  Yes   • Opioid withdrawal delirium (CMS/HCC) [F11.23]  Unknown   • Hypopotassemia [E87.6]  Unknown      Resolved Hospital Problems   No resolved problems to display.       Plan:  neuro consult noted and continue seizure meds.  Follow lab. Restraints and sitter. Supportive care.  Continue with meds as per neurology.    Nakul Barbosa MD  7/5/2020  14:49

## 2020-07-06 ENCOUNTER — APPOINTMENT (OUTPATIENT)
Dept: CT IMAGING | Facility: HOSPITAL | Age: 50
End: 2020-07-06

## 2020-07-06 LAB — ZONISAMIDE SERPL-MCNC: 16.7 UG/ML (ref 10–40)

## 2020-07-06 PROCEDURE — 70450 CT HEAD/BRAIN W/O DYE: CPT

## 2020-07-06 PROCEDURE — 99233 SBSQ HOSP IP/OBS HIGH 50: CPT | Performed by: NURSE PRACTITIONER

## 2020-07-06 PROCEDURE — 25010000002 HALOPERIDOL LACTATE PER 5 MG: Performed by: PSYCHIATRY & NEUROLOGY

## 2020-07-06 PROCEDURE — 25010000002 MORPHINE PER 10 MG: Performed by: PSYCHIATRY & NEUROLOGY

## 2020-07-06 PROCEDURE — 25010000002 LORAZEPAM PER 2 MG: Performed by: HOSPITALIST

## 2020-07-06 PROCEDURE — 25010000003 LEVETIRACETAM IN NACL 0.75% 1000 MG/100ML SOLUTION: Performed by: PSYCHIATRY & NEUROLOGY

## 2020-07-06 RX ORDER — ALBUTEROL SULFATE 90 UG/1
1-2 AEROSOL, METERED RESPIRATORY (INHALATION) EVERY 4 HOURS PRN
COMMUNITY

## 2020-07-06 RX ORDER — POTASSIUM CHLORIDE 1.5 G/1.77G
40 POWDER, FOR SOLUTION ORAL AS NEEDED
Status: CANCELLED | OUTPATIENT
Start: 2020-07-06

## 2020-07-06 RX ORDER — OXYCODONE AND ACETAMINOPHEN 7.5; 325 MG/1; MG/1
1 TABLET ORAL EVERY 4 HOURS PRN
Status: DISCONTINUED | OUTPATIENT
Start: 2020-07-06 | End: 2020-07-10 | Stop reason: HOSPADM

## 2020-07-06 RX ORDER — POTASSIUM CHLORIDE 750 MG/1
40 CAPSULE, EXTENDED RELEASE ORAL AS NEEDED
Status: CANCELLED | OUTPATIENT
Start: 2020-07-06

## 2020-07-06 RX ORDER — AMITRIPTYLINE HYDROCHLORIDE 150 MG/1
300 TABLET, FILM COATED ORAL NIGHTLY
Status: ON HOLD | COMMUNITY
End: 2020-07-10 | Stop reason: SDUPTHER

## 2020-07-06 RX ORDER — ZONISAMIDE 100 MG/1
200 CAPSULE ORAL 2 TIMES DAILY
COMMUNITY
End: 2020-07-10 | Stop reason: HOSPADM

## 2020-07-06 RX ORDER — CYCLOBENZAPRINE HCL 10 MG
10 TABLET ORAL 3 TIMES DAILY PRN
COMMUNITY
End: 2020-07-10 | Stop reason: HOSPADM

## 2020-07-06 RX ADMIN — HALOPERIDOL LACTATE 2 MG: 5 INJECTION, SOLUTION INTRAMUSCULAR at 22:10

## 2020-07-06 RX ADMIN — SODIUM CHLORIDE, PRESERVATIVE FREE 10 ML: 5 INJECTION INTRAVENOUS at 10:56

## 2020-07-06 RX ADMIN — MORPHINE SULFATE 2 MG: 2 INJECTION, SOLUTION INTRAMUSCULAR; INTRAVENOUS at 01:10

## 2020-07-06 RX ADMIN — MORPHINE SULFATE 2 MG: 2 INJECTION, SOLUTION INTRAMUSCULAR; INTRAVENOUS at 10:47

## 2020-07-06 RX ADMIN — HALOPERIDOL LACTATE 2 MG: 5 INJECTION, SOLUTION INTRAMUSCULAR at 12:42

## 2020-07-06 RX ADMIN — LEVETIRACETAM 1000 MG: 10 INJECTION INTRAVENOUS at 22:10

## 2020-07-06 RX ADMIN — OXYCODONE HYDROCHLORIDE AND ACETAMINOPHEN 1 TABLET: 7.5; 325 TABLET ORAL at 22:09

## 2020-07-06 RX ADMIN — OLANZAPINE 5 MG: 5 TABLET ORAL at 17:32

## 2020-07-06 RX ADMIN — LORAZEPAM 1 MG: 1 TABLET ORAL at 05:17

## 2020-07-06 RX ADMIN — POTASSIUM CHLORIDE 40 MEQ: 10 CAPSULE, COATED, EXTENDED RELEASE ORAL at 17:30

## 2020-07-06 RX ADMIN — LORAZEPAM 1 MG: 2 INJECTION INTRAMUSCULAR; INTRAVENOUS at 12:43

## 2020-07-06 RX ADMIN — HALOPERIDOL LACTATE 2 MG: 5 INJECTION, SOLUTION INTRAMUSCULAR at 02:24

## 2020-07-06 RX ADMIN — SODIUM CHLORIDE 100 ML/HR: 9 INJECTION, SOLUTION INTRAVENOUS at 02:25

## 2020-07-06 RX ADMIN — OXYCODONE HYDROCHLORIDE AND ACETAMINOPHEN 1 TABLET: 7.5; 325 TABLET ORAL at 17:32

## 2020-07-06 RX ADMIN — LEVETIRACETAM 1000 MG: 10 INJECTION INTRAVENOUS at 10:54

## 2020-07-06 NOTE — PLAN OF CARE
"Patient had gotten out of restranits x2 today.  Was given prn dose of ativan and haldol.  Unable to do serial additions and notes mild anxiety, with some visual disturbances, states \"do you see them spiders crawling on the floor?\"  VSS.  PO potassium replaced.  IV fluids stopped per Dr. Bone request.  Patient notes pain to lower back and intermittent leg pain.  Patient has been alert to person, place, and time, but not to situation.  Gait still unsteady.  Had BM today.  Appetite has been fair.  Last 3 hours of shift patient has been using call light appropriately, trial on removing one of non-violent restraints.   Maxx updated per patient request, and  was called by Dr. Bone per his request.  Breathing even and unlabored.  Sinus to sinus tach.  NAD noted.  Bed alarm on.  Will continue to monitor.   "

## 2020-07-06 NOTE — PLAN OF CARE
VSS, ativan given once, awake all night. Restraints in place, sitter at bedside. Will continue to monitor.

## 2020-07-06 NOTE — PROGRESS NOTES
Continued Stay Note  [facility]     Patient Name: Mary Chavez  MRN: [id number]  Today's Date: 7/6/2020    Admit Date: 7/2/2020    Discharge Plan     Row Name 07/06/20 1356       Plan    Plan  Undetermined-     Plan Comments  Reviewed clinicals, pt remains in restraints due to lines. [facility] following. PT unable to see today. CCP to continue to follow for medical stability for additional dc needs. Mumtaz MUNOZ/CCP        Discharge Codes    No documentation.             Sudha Styles, RN

## 2020-07-06 NOTE — SIGNIFICANT NOTE
07/06/20 0939   Rehab Time/Intention   Evaluation Not Performed other (see comments)  (Spoke with RN; pt. remains in restraints and is confused/agitated. Will follow up tomorrow.)   Rehab Treatment   Discipline physical therapist   Recommendation   PT - Next Appointment 07/07/20

## 2020-07-06 NOTE — PROGRESS NOTES
[facility]     LOS: 3 days     Subjective:    CC:Seizures    DIET:  Diet Order   Procedures   • Diet Regular   Patient in restraints but cooperative at times.  Able to walk patient around nurses station twice with the assistance of nurse and an aide.    Patient denies any chest discomfort or shortness of air.  But does say that she has pain all over including her legs from her restless leg syndrome.    Objective:    Vital Signs:  Temp:  [97.7 °F (36.5 °C)-98.7 °F (37.1 °C)] 98.1 °F (36.7 °C)  Heart Rate:  [] 101  Resp:  [16-18] 16  BP: (108-151)/() 116/96    SpO2:  [97 %-98 %] 97 %  on   ;   Device (Oxygen Therapy): room air  Body mass index is 27.31 kg/m².    Physical Exam   Constitutional: She appears well-developed and well-nourished.   Neck: No tracheal deviation present. No thyromegaly present.   Cardiovascular: Regular rhythm.   No murmur heard.  Pulmonary/Chest: Effort normal and breath sounds normal. No respiratory distress.   Abdominal: Soft. Bowel sounds are normal.   Musculoskeletal:   Unsteady on her feet needs assistance of 2 people to safely get around nurses station twice.   Neurological: She is alert.   Skin: Skin is warm and dry.   Psychiatric:   Mostly pleasant but gets quickly agitated       Results Review:    Glucose   Date Value Ref Range Status   07/05/2020 108 (H) 65 - 99 mg/dL Final   07/04/2020 238 (H) 65 - 99 mg/dL Final     Results from last 7 days   Lab Units 07/05/20  1004   WBC 10*3/mm3 7.60   HEMOGLOBIN g/dL 15.5   HEMATOCRIT % 43.8   PLATELETS 10*3/mm3 318     Results from last 7 days   Lab Units 07/05/20  1004  07/03/20  0447   SODIUM mmol/L 136   < > 140   POTASSIUM mmol/L 3.3*   < > 3.7   CHLORIDE mmol/L 106   < > 106   CO2 mmol/L 18.2*   < > 25.7   BUN mg/dL 6   < > 11   CREATININE mg/dL 0.59   < > 0.68   CALCIUM mg/dL 8.5*   < > 8.7   BILIRUBIN mg/dL  --   --  0.4   ALK PHOS U/L  --   --  241*   ALT (SGPT) U/L  --   --  15   AST (SGOT)  U/L  --   --  15   GLUCOSE mg/dL 108*   < > 93    < > = values in this interval not displayed.         Results from last 7 days   Lab Units 07/03/20  0447   MAGNESIUM mg/dL 2.3     Results from last 7 days   Lab Units 07/03/20  0447 07/02/20  1838   CK TOTAL U/L 114  --    TROPONIN T ng/mL  --  <0.010     Cultures:  No results found for: BLOODCX, URINECX, WOUNDCX, MRSACX, RESPCX, STOOLCX    I have reviewed daily medications and changes in CPOE    Scheduled meds    levETIRAcetam 1,000 mg Intravenous Q12H   lidocaine 5 mL Intradermal Once   Morphine 2 mg Intravenous Q6H   OLANZapine 5 mg Oral Daily Before Supper   sodium chloride 10 mL Intravenous Q12H         sodium chloride 100 mL/hr Last Rate: 100 mL/hr (07/06/20 0225)     PRN meds  •  haloperidol lactate  •  LORazepam  •  LORazepam **OR** LORazepam **OR** LORazepam **OR** LORazepam **OR** LORazepam **OR** LORazepam  •  nitroglycerin  •  ondansetron  •  potassium chloride **OR** potassium chloride **OR** potassium chloride  •  [COMPLETED] Insert peripheral IV **AND** sodium chloride  •  sodium chloride        Seizures (CMS/HCC)    Hypopotassemia    Opioid withdrawal delirium (CMS/[facility])        Assessment/Plan:  -Keppra 1000 IV twice daily, neurology following  -Morphine every 6 hours, changed to as needed oral pain medication  -agitation seems to be improved    Discussed with , discussed with neurology, discussed on rounds, visited patient twice  >35 minutes spent, > 1/2 time spent counseling and coordination of care on seizures and hypokalemia      Keith Bone MD  07/06/20  12:51

## 2020-07-06 NOTE — PROGRESS NOTES
"DOS: 2020  NAME: Mary Chavez   : 1970  PCP: Provider, No Known  Chief Complaint   Patient presents with   • Seizures     Patient seen in follow-up today; new to me          Subjective: Patient remains impulsive requiring restraints.  She is alert to self and location and able to follow simple commands.      No family at bedside.    Objective:  Vital signs: /96 (BP Location: Right arm, Patient Position: Lying)   Pulse 101   Temp 98.1 °F (36.7 °C) (Oral)   Resp 16   Ht 167.6 cm (66\")   Wt 76.7 kg (169 lb 3.2 oz)   SpO2 97%   Breastfeeding No   BMI 27.31 kg/m²       HEENT: Normocephalic, atraumatic   COR: RRR  Resp: Even and unlabored  Extremities: Equal pulses, nondistal embolization.  Bilateral wrist restraint    Neurological:   MS: Alert oriented to self.  Language normal. No neglect. Higher integrative function normal  CN: II-XII normal  Motor: 5/5, normal tone  Reflexes: Toes downgoing  Sensory: Intact  Coordination: Normal    Laboratory results:  Lab Results   Component Value Date    GLUCOSE 108 (H) 2020    CALCIUM 8.5 (L) 2020     2020    K 3.3 (L) 2020    CO2 18.2 (L) 2020     2020    BUN 6 2020    CREATININE 0.59 2020    EGFRIFNONA 108 2020    BCR 10.2 2020    ANIONGAP 11.8 2020     Lab Results   Component Value Date    WBC 7.60 2020    HGB 15.5 2020    HCT 43.8 2020    MCV 86.1 2020     2020         Review and interpretation of imaging:  CT HEAD WO CONTRAST-     CLINICAL HISTORY: Patient fell and struck right side of head. Confusion.     TECHNIQUE: Transverse 3 mm thick images were acquired from the base of  the skull to the vertex without IV contrast.     Radiation dose reduction techniques were utilized, including automated  exposure control and exposure modulation based on body size.     COMPARISON: CT scan of the head dated 2020.     FINDINGS: The images are " significantly degraded due to patient motion.  The brain and ventricular system appear grossly normal. No obvious areas  of hemorrhage are identified. There is no mass effect. Bone window  images demonstrate no evidence of skull fracture. The paranasal sinuses  are grossly well aerated.     IMPRESSION:  Suboptimal study due to significant patient motion. No  definite acute intracranial abnormality is identified.     This report was finalized on 7/4/2020 3:30 AM by Dr. Paulino Gregorio M.D.  CT HEAD WO CONTRAST-     INDICATIONS: Seizures     TECHNIQUE: Radiation dose reduction techniques were utilized, including  automated exposure control and exposure modulation based on body size.  Noncontrast head CT     COMPARISON: 06/16/2020, correlated with MRI from 01/05/2015     FINDINGS:           No acute intracranial hemorrhage, midline shift or mass effect. No acute  territorial infarct is identified.     Moderate to prominent, similar appearing periventricular hypodensities,  again reflecting chronic nonspecific white matter disease in this  patient, as demonstrated on multiple prior exams.     Ventricles, cisterns, cerebral sulci are unremarkable for patient age.     Small likely mucous retention cyst or polyp in the left maxillary sinus.  The visualized paranasal sinuses, orbits, mastoid air cells are  otherwise unremarkable.                 IMPRESSION:     No acute intracranial hemorrhage or hydrocephalus. Chronic changes of  the brain. If there is further clinical concern, MRI could be considered  for further evaluation.     This report was finalized on 7/2/2020 7:16 PM by Dr. Tiago Brand M.D.     PELVIS AND RIGHT HIP X-RAYS     CLINICAL HISTORY: Patient fell. Hip pain.     An AP view the pelvis and AP and frog-leg lateral views of the right hip  were obtained. There is mild osteopenia and there is mild narrowing of  both hip joint spaces. No acute fracture or subluxation is identified.     This report was  finalized on 7/4/2020 3:56 AM by Dr. Paulino Gregorio M.D.    Impression: This is a 49-year-old female with history of gastric bypass surgery, hysterectomy, seizure (MAR reflects Keppra 500 twice daily prior to arrival although patient unsure if she was taking this), opioid and tobacco use who presented to [facility] on 7/2 to reportedly having generalized seizure and was thought to be post ictal afterwards as she was very drowsy and confused.  Other medications at home include Elavil/gabapentin/Effexor/Zonegran.  Work-up to date below.    Neurologically, stable but remains altered and impulsive.  Recommendations below.PT/OT/ST. CCP to assist with discharge planning. Call RRT for any acute neurological changes and/or concerns. We will continue to follow and advise.     Work-up to date:  CT head 7/2: Negative acute  CT head 7/4: Hampered by motion; negative acute  Labs: Magnesium 2.3, potassium 3.3, calcium 8.5, CO2 18.2, anion gap 11.8    Plan:  Keppra 1000 mg twice daily (thought to be on 500 twice daily prior to arrival)  Zyprexa 5 mg nightly  Access consult (pending)    Seizure Precautions: Patient is not to drive for at least 3 months until cleared by a physician, operate heavy machinery, take tub baths, swim unattended, climb heights, or perform any other activities that can bring harm to himself/herself or others during an episode of altered awareness.    Case reviewed with and patient seen in conjunction with

## 2020-07-06 NOTE — PROGRESS NOTES
Clinical Pharmacy Services: Medication History    Mary Chavez is a 49 y.o. female presenting to [facility] for Seizures (CMS/[facility]) [R56.9]  Seizures (CMS/HCC) [R56.9]  Seizures (CMS/HCC) [R56.9]    She  has a past medical history of Seizures (CMS/[facility]).    Allergies as of 07/02/2020    (No Known Allergies)       Medication information was obtained from: [facility] listed on [facility]  Pharmacy and Phone Number: [phone]    Prior to Admission Medications       Prescriptions Last Dose Informant Patient Reported? Taking?    albuterol sulfate  (90 Base) MCG/ACT inhaler  Pharmacy Yes Yes    Inhale 1-2 puffs Every 4 (Four) Hours As Needed for Wheezing.    amitriptyline (ELAVIL) 150 MG tablet  Pharmacy Yes Yes    Take 300 mg by mouth Every Night.    cyclobenzaprine (FLEXERIL) 10 MG tablet  Pharmacy Yes Yes    Take 10 mg by mouth 3 (Three) Times a Day As Needed for Muscle Spasms.    venlafaxine (EFFEXOR) 75 MG tablet  Pharmacy Yes Yes    Take 150 mg by mouth Daily.    zonisamide (ZONEGRAN) 100 MG capsule  Pharmacy Yes Yes    Take 200 mg by mouth 2 (two) times a day.                Medication notes:   Removed Norco; keppra; duloxetine; diclofenac; gabapentin  Changed: zonisamide dose       All info obtained from [facility].  Per bedside RN patient and  did not say they obtained medications from any other pharmacy.    This medication list is complete to the best of my knowledge as of 7/6/2020    Please call if questions.    Jammie Brand, Pharm.D., [facility]  7/6/2020 13:59

## 2020-07-07 PROBLEM — E87.6 HYPOPOTASSEMIA: Status: RESOLVED | Noted: 2020-07-03 | Resolved: 2020-07-07

## 2020-07-07 LAB
ANION GAP SERPL CALCULATED.3IONS-SCNC: 10.9 MMOL/L (ref 5–15)
BUN SERPL-MCNC: 7 MG/DL (ref 6–20)
BUN/CREAT SERPL: 12.5 (ref 7–25)
CALCIUM SPEC-SCNC: 9.1 MG/DL (ref 8.6–10.5)
CHLORIDE SERPL-SCNC: 107 MMOL/L (ref 98–107)
CO2 SERPL-SCNC: 20.1 MMOL/L (ref 22–29)
CREAT SERPL-MCNC: 0.56 MG/DL (ref 0.57–1)
GFR SERPL CREATININE-BSD FRML MDRD: 115 ML/MIN/1.73
GLUCOSE SERPL-MCNC: 107 MG/DL (ref 65–99)
POTASSIUM SERPL-SCNC: 3.6 MMOL/L (ref 3.5–5.2)
SODIUM SERPL-SCNC: 138 MMOL/L (ref 136–145)

## 2020-07-07 PROCEDURE — 99232 SBSQ HOSP IP/OBS MODERATE 35: CPT | Performed by: NURSE PRACTITIONER

## 2020-07-07 PROCEDURE — 97166 OT EVAL MOD COMPLEX 45 MIN: CPT

## 2020-07-07 PROCEDURE — 25010000002 HALOPERIDOL LACTATE PER 5 MG: Performed by: PSYCHIATRY & NEUROLOGY

## 2020-07-07 PROCEDURE — 25010000002 LORAZEPAM PER 2 MG: Performed by: HOSPITALIST

## 2020-07-07 PROCEDURE — 90791 PSYCH DIAGNOSTIC EVALUATION: CPT

## 2020-07-07 PROCEDURE — 97530 THERAPEUTIC ACTIVITIES: CPT

## 2020-07-07 PROCEDURE — 80048 BASIC METABOLIC PNL TOTAL CA: CPT | Performed by: HOSPITALIST

## 2020-07-07 RX ORDER — LEVETIRACETAM 500 MG/1
1000 TABLET ORAL 2 TIMES DAILY
Status: DISCONTINUED | OUTPATIENT
Start: 2020-07-07 | End: 2020-07-10 | Stop reason: HOSPADM

## 2020-07-07 RX ORDER — LEVETIRACETAM 500 MG/1
1000 TABLET ORAL 2 TIMES DAILY
Status: DISCONTINUED | OUTPATIENT
Start: 2020-07-07 | End: 2020-07-07

## 2020-07-07 RX ORDER — NICOTINE 21 MG/24HR
1 PATCH, TRANSDERMAL 24 HOURS TRANSDERMAL
Status: DISCONTINUED | OUTPATIENT
Start: 2020-07-07 | End: 2020-07-10 | Stop reason: HOSPADM

## 2020-07-07 RX ADMIN — LORAZEPAM 1 MG: 2 INJECTION INTRAMUSCULAR; INTRAVENOUS at 18:18

## 2020-07-07 RX ADMIN — LORAZEPAM 1 MG: 2 INJECTION INTRAMUSCULAR; INTRAVENOUS at 04:09

## 2020-07-07 RX ADMIN — OXYCODONE HYDROCHLORIDE AND ACETAMINOPHEN 1 TABLET: 7.5; 325 TABLET ORAL at 08:29

## 2020-07-07 RX ADMIN — LEVETIRACETAM 1000 MG: 500 TABLET, FILM COATED ORAL at 08:28

## 2020-07-07 RX ADMIN — LORAZEPAM 1 MG: 1 TABLET ORAL at 10:54

## 2020-07-07 RX ADMIN — HALOPERIDOL LACTATE 2 MG: 5 INJECTION, SOLUTION INTRAMUSCULAR at 18:18

## 2020-07-07 RX ADMIN — AMITRIPTYLINE HYDROCHLORIDE 75 MG: 50 TABLET, FILM COATED ORAL at 18:13

## 2020-07-07 RX ADMIN — LORAZEPAM 1 MG: 2 INJECTION INTRAMUSCULAR; INTRAVENOUS at 15:53

## 2020-07-07 RX ADMIN — NICOTINE 1 PATCH: 14 PATCH TRANSDERMAL at 17:10

## 2020-07-07 RX ADMIN — LEVETIRACETAM 1000 MG: 500 TABLET, FILM COATED ORAL at 21:27

## 2020-07-07 RX ADMIN — OLANZAPINE 5 MG: 5 TABLET ORAL at 17:12

## 2020-07-07 RX ADMIN — OXYCODONE HYDROCHLORIDE AND ACETAMINOPHEN 1 TABLET: 7.5; 325 TABLET ORAL at 14:25

## 2020-07-07 RX ADMIN — POTASSIUM CHLORIDE 40 MEQ: 10 CAPSULE, COATED, EXTENDED RELEASE ORAL at 08:29

## 2020-07-07 RX ADMIN — LORAZEPAM 1 MG: 2 INJECTION INTRAMUSCULAR; INTRAVENOUS at 01:57

## 2020-07-07 NOTE — CONSULTS
"Pt is a 48 yo female seen on [address] [place] for drug use/metabolic encephalopathy.  Pt is , and lives with  Maxx.  She used to work as a  and has a 10th grade education.  She has two grown sons.  Pt is oriented to self and location.  Thinking is tangential and mood is labile.  Statements are inappropriate at times, talking about \"black men can't take eyes off of me\" and how \"beltran men flock to me, do you get along with gays, too?\".  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  Pt is easily distracted by the TV during our conversation but did not want to turn it off.  She jumps from topic to topic including summer camp to her relationship with her mom, to her  job, then back to [facility].  She is easily redirected and able to refocus when asked.  Pt denies any SI and HI.  She reports a hx of therapy and seeing a psychiatrist, but couldn't remember their names.  Pt does report hallucinations and at one point asked me if I was holding a dog in my lap, a black poodle.  When asked if she sees things like this often, and she states \"sometimes but it's not bad\".    Pt reports a significant drug hx including cocaine and heroin.  She reports that she still using THC \"about once a month\".  Pt reports alcohol use of 1/2 pint of either chelo, crown royal, segrams 7.  Then pt states she only drinks once a year \"when I feel like I can't take it anymore\".  Per RN on 7/4, pt reports that she has been drinking heavily 3x a week.  She denies that at the time of this interview but seemed to be in alcohol withdrawal earlier in week.  Per chart, pt  informed staff that pt has been buying norco off the street when she ran out of her prescription.    Pt remains impulsive, trying to get out of bed on her own but is unsteady, and is still in bilateral wrist restraints.    Access will follow briefly to see if pt mentation clears " enough to talk about resources for drug use.

## 2020-07-07 NOTE — DISCHARGE INSTRUCTIONS
Seizure Precautions: Patient is not to drive for at least 3 months until cleared by a physician, operate heavy machinery, take tub baths, swim unattended, climb heights, or perform any other activities that can bring harm to himself/herself or others during an episode of altered awareness.

## 2020-07-07 NOTE — PROGRESS NOTES
Name: Mary Chavez ADMIT: 2020   : 1970  PCP: Provider, No Known    MRN: [id number] LOS: 4 days   AGE/SEX: 49 y.o. female  ROOM: [facility]     Subjective   Subjective   I saw the patient this afternoon.  She was pleasant and cooperative.  Had been agitated and combative earlier.    Review of Systems     Objective   Objective   Vital Signs  Temp:  [97.9 °F (36.6 °C)-98.3 °F (36.8 °C)] 98.3 °F (36.8 °C)  Heart Rate:  [] 96  Resp:  [16-18] 18  BP: (113-159)/() 159/93  SpO2:  [95 %-96 %] 95 %  on   ;   Device (Oxygen Therapy): room air  Body mass index is 27.31 kg/m².  Physical Exam   Constitutional: She appears well-developed and well-nourished. No distress.   Looks older than age   HENT:   Head: Normocephalic.   Neck: Neck supple. No JVD present.   Scar from previous trach   Cardiovascular: Normal rate and regular rhythm.   Murmur heard.  Grade 2/6 to 3/6 systolic murmur best heard left sternal border   Musculoskeletal: She exhibits deformity. She exhibits no edema.   Joint deformities fourth and fifth fingers bilaterally   Neurological: She is alert.   No tremor but is in wrist restraints   Skin: Skin is warm. She is not diaphoretic.   Hyperpigmented skin   Nursing note and vitals reviewed.      Results Review:       I reviewed the patient's new clinical results.  Results from last 7 days   Lab Units 20  1004 20  0906 20  0447 20  1838   WBC 10*3/mm3 7.60 7.75 7.60 7.51   HEMOGLOBIN g/dL 15.5 14.7 13.2 13.8   PLATELETS 10*3/mm3 318 359 367 451*     Results from last 7 days   Lab Units 20  0517 20  1004 20  0906 20  0447   SODIUM mmol/L 138 136 137 140   POTASSIUM mmol/L 3.6 3.3* 3.0* 3.7   CHLORIDE mmol/L 107 106 104 106   CO2 mmol/L 20.1* 18.2* 22.6 25.7   BUN mg/dL 7 6 8 11   CREATININE mg/dL 0.56* 0.59 0.80 0.68   GLUCOSE mg/dL 107* 108* 238* 93   Estimated Creatinine Clearance: 125.8 mL/min (A) (by C-G formula based on SCr of 0.56 mg/dL  (L)).  Results from last 7 days   Lab Units 07/03/20  0447 07/02/20  1838   ALBUMIN g/dL 3.40* 3.70   BILIRUBIN mg/dL 0.4 0.3   ALK PHOS U/L 241* 270*   AST (SGOT) U/L 15 15   ALT (SGPT) U/L 15 15     Results from last 7 days   Lab Units 07/07/20  0517 07/05/20  1004 07/04/20  0906 07/03/20  0447 07/02/20  1838   CALCIUM mg/dL 9.1 8.5* 9.0 8.7 9.1   ALBUMIN g/dL  --   --   --  3.40* 3.70   MAGNESIUM mg/dL  --   --   --  2.3  --        Glucose   Date/Time Value Ref Range Status   07/04/2020 2130 95 70 - 130 mg/dL Final         amitriptyline 75 mg Oral Q12H   levETIRAcetam 1,000 mg Oral BID   lidocaine 5 mL Intradermal Once   nicotine 1 patch Transdermal Q24H   OLANZapine 5 mg Oral Daily Before Supper   sodium chloride 10 mL Intravenous Q12H      Diet Regular       Assessment/Plan     Active Hospital Problems    Diagnosis  POA   • **Opioid withdrawal delirium (CMS/HCC) [F11.23]  Unknown   • Seizures (CMS/HCC) [R56.9]  Yes      Resolved Hospital Problems    Diagnosis Date Resolved POA   • Hypopotassemia [E87.6] 07/07/2020 Unknown       · Withdrawal secondary to opiates.  Zyprexa daily.  As needed Ativan  · Seizure disorder.  Keppra dosage increased by neurology  · Nicotine withdrawal.  Nicotine patch has been started  · Insomnia.  She is on high-dose Elavil at night (300 mg).  I started 75 mg twice daily with parameters for which to hold.  · Hyperglycemia.  Will check A1c.  No results from this year      Sydnie Vick MD  [facility]  07/07/20  17:52

## 2020-07-07 NOTE — PLAN OF CARE
Patient has been restless and attempted to get out of bed without calling for assistance several times today.  Access was able to see her today and able to hold more of a conversation than they previously had.  Is disoriented to situation, still has visual hallucinations.  Speech is vulgar and inappropriate.  Nicotine patch applied.  Still complains of lower back and bilateral knee pain, that will alleviate with prn pain medication.  VSS. Sinus to sinus tach.  Breathing is even and unlabored.  CIWA ranged between 7-8 between both assessments.  Bed alarm on.  Will continue to monitor.

## 2020-07-07 NOTE — PLAN OF CARE
Patient has had restraints D/C'd. Patient was alert and oriented and using her call light appropriately until around 0100. At that time patient became confused and having hallucinations. CIWA score increased and PRN ativan was given. Patient stated that she can only sleep when given her Elavil. Patient bed alarm is on and side rails are up X3.  Will continue to monitor.         Problem: Patient Care Overview  Goal: Plan of Care Review  Outcome: Ongoing (interventions implemented as appropriate)  Flowsheets (Taken 7/7/2020 2141)  Progress: improving  Plan of Care Reviewed With: patient  Goal: Individualization and Mutuality  Outcome: Ongoing (interventions implemented as appropriate)  Goal: Discharge Needs Assessment  Outcome: Ongoing (interventions implemented as appropriate)  Goal: Interprofessional Rounds/Family Conf  Outcome: Ongoing (interventions implemented as appropriate)     Problem: Fall Risk (Adult)  Goal: Absence of Fall  Outcome: Ongoing (interventions implemented as appropriate)     Problem: Skin Injury Risk (Adult)  Goal: Skin Health and Integrity  Outcome: Ongoing (interventions implemented as appropriate)     Problem: Seizure Disorder/Epilepsy (Adult)  Goal: Signs and Symptoms of Listed Potential Problems Will be Absent, Minimized or Managed (Seizure Disorder/Epilepsy)  Outcome: Ongoing (interventions implemented as appropriate)     Problem: Opioid Dependence/Withdrawal (Adult)  Goal: Withdrawal Symptoms Managed or Absent  Outcome: Ongoing (interventions implemented as appropriate)  Goal: Psychosocial Wellbeing  Outcome: Ongoing (interventions implemented as appropriate)     Problem: Alcohol Withdrawal Acute, Risk/Actual (Adult)  Goal: Signs and Symptoms of Listed Potential Problems Will be Absent, Minimized or Managed (Alcohol Withdrawal Acute, Risk/Actual)  Outcome: Ongoing (interventions implemented as appropriate)

## 2020-07-07 NOTE — PLAN OF CARE
Patient placed back in bilateral soft wrist restraints at 0530 this am.            Problem: Restraint, Nonbehavioral (Nonviolent)  Goal: Rationale and Justification  Outcome: Ongoing (interventions implemented as appropriate)  Flowsheets (Taken 7/7/2020 0547)  Rationale and Justification: prevent harm to self  Goal: Nonbehavioral (Nonviolent) Restraint: Absence of Injury/Harm  Outcome: Ongoing (interventions implemented as appropriate)  Flowsheets (Taken 7/5/2020 1033 by Kathy Gillespie RN)  Nonbehavioral (Nonviolent) Restraint: Absence of Injury/Harm: met  Goal: Nonbehavioral (Nonviolent) Restraint: Achievement of Discontinuation Criteria  Outcome: Ongoing (interventions implemented as appropriate)  Flowsheets (Taken 7/5/2020 0457 by Melissa Copeland RN)  Nonbehavioral (Nonviolent) Restraint: Achievement of Discontinuation Criteria: not met  Goal: Nonbehavioral (Nonviolent) Restraint: Preservation of Dignity and Wellbeing  Outcome: Ongoing (interventions implemented as appropriate)  Flowsheets (Taken 7/5/2020 0654 by Melissa Copeland, RN)  Nonbehavioral (Nonviolent) Restraint: Preservation of Dignity and Wellbeing: not met

## 2020-07-07 NOTE — PROGRESS NOTES
"DOS: 2020  NAME: Mary Chavez   : 1970  PCP: Provider, No Known  Chief Complaint   Patient presents with   • Seizures     Subjective: In and out of restraints overnight; patient remains combative and impulsive. Alert and oriented and following simple commands.       No family at bedside.    Objective:  Vital signs: BP (!) 149/101 (BP Location: Right arm, Patient Position: Lying)   Pulse 88   Temp 98.3 °F (36.8 °C) (Oral)   Resp 18   Ht 167.6 cm (66\")   Wt 76.7 kg (169 lb 3.2 oz)   SpO2 95%   Breastfeeding No   BMI 27.31 kg/m²       HEENT: Normocephalic, atraumatic   COR: RRR  Resp: Even and unlabored  Extremities: Equal pulses, nondistal embolization.  Bilateral wrist restraint    Neurological:   MS: Alert oriented to self.  Language normal. No neglect. Higher integrative function normal  CN: II-XII normal  Motor: 5/5, normal tone  Reflexes: Toes downgoing  Sensory: Intact  Coordination: Normal    Laboratory results:  Lab Results   Component Value Date    GLUCOSE 107 (H) 2020    CALCIUM 9.1 2020     2020    K 3.6 2020    CO2 20.1 (L) 2020     2020    BUN 7 2020    CREATININE 0.56 (L) 2020    EGFRIFNONA 115 2020    BCR 12.5 2020    ANIONGAP 10.9 2020     Lab Results   Component Value Date    WBC 7.60 2020    HGB 15.5 2020    HCT 43.8 2020    MCV 86.1 2020     2020         Review and interpretation of imaging:  CT HEAD WO CONTRAST-     CLINICAL HISTORY: Patient fell and struck right side of head. Confusion.     TECHNIQUE: Transverse 3 mm thick images were acquired from the base of  the skull to the vertex without IV contrast.     Radiation dose reduction techniques were utilized, including automated  exposure control and exposure modulation based on body size.     COMPARISON: CT scan of the head dated 2020.     FINDINGS: The images are significantly degraded due to patient " motion.  The brain and ventricular system appear grossly normal. No obvious areas  of hemorrhage are identified. There is no mass effect. Bone window  images demonstrate no evidence of skull fracture. The paranasal sinuses  are grossly well aerated.     IMPRESSION:  Suboptimal study due to significant patient motion. No  definite acute intracranial abnormality is identified.     This report was finalized on 7/4/2020 3:30 AM by Dr. Paulino Gregorio M.D.  CT HEAD WO CONTRAST-     INDICATIONS: Seizures     TECHNIQUE: Radiation dose reduction techniques were utilized, including  automated exposure control and exposure modulation based on body size.  Noncontrast head CT     COMPARISON: 06/16/2020, correlated with MRI from 01/05/2015     FINDINGS:           No acute intracranial hemorrhage, midline shift or mass effect. No acute  territorial infarct is identified.     Moderate to prominent, similar appearing periventricular hypodensities,  again reflecting chronic nonspecific white matter disease in this  patient, as demonstrated on multiple prior exams.     Ventricles, cisterns, cerebral sulci are unremarkable for patient age.     Small likely mucous retention cyst or polyp in the left maxillary sinus.  The visualized paranasal sinuses, orbits, mastoid air cells are  otherwise unremarkable.                 IMPRESSION:     No acute intracranial hemorrhage or hydrocephalus. Chronic changes of  the brain. If there is further clinical concern, MRI could be considered  for further evaluation.     This report was finalized on 7/2/2020 7:16 PM by Dr. Tiago Brand M.D.     PELVIS AND RIGHT HIP X-RAYS     CLINICAL HISTORY: Patient fell. Hip pain.     An AP view the pelvis and AP and frog-leg lateral views of the right hip  were obtained. There is mild osteopenia and there is mild narrowing of  both hip joint spaces. No acute fracture or subluxation is identified.     This report was finalized on 7/4/2020 3:56 AM by Dr. Bob  NNAMDI Gregorio.    Impression: This is a 49-year-old female with history of gastric bypass surgery, hysterectomy, seizure (MAR reflects Keppra 500 twice daily prior to arrival although patient unsure if she was taking this), opioid and tobacco use who presented to [facility] on 7/2 to reportedly having generalized seizure and was thought to be post ictal afterwards as she was very drowsy and confused.  Other medications at home include Elavil/gabapentin/Effexor/Zonegran.  Work-up to date below.    Neurologically, unachanged-stable but remains impulsive. Plan unchanged; no new seizure-like activity. Recommendations below. No further neurology workup indicated. We will sign off and see again upon request.        Work-up to date:  CT head 7/2: Negative acute  CT head 7/4: Hampered by motion; negative acute  Labs: Magnesium 2.3, potassium 3.3, calcium 8.5, CO2 18.2, anion gap 11.8    Plan:  Keppra 1000 mg twice daily (thought to be on 500mg twice daily prior to arrival)  Zyprexa 5 mg nightly  Access consult (pending)  Follow-up w/ Neurology in 4-6 weeks as OP     Seizure Precautions: Patient is not to drive for at least 3 months until cleared by a physician, operate heavy machinery, take tub baths, swim unattended, climb heights, or perform any other activities that can bring harm to himself/herself or others during an episode of altered awareness.    Case reviewed with and patient seen in conjunction with  7/7

## 2020-07-07 NOTE — PLAN OF CARE
Problem: Patient Care Overview  Goal: Plan of Care Review  Note:   Pt presents to [facility] 2/2 to seizure activity and AMS. On time of arrival today, pt ambulating in hallway with assist of nursing staff. Pt appeared anxious, confused and restless. Pt completed mobility in the hallway environment with Joe MAE. Pt transferred to her hospital room with re-direction. Pt restless and requesting to mobilize further in hallway. Joe MAE provided utilizing hand over hand assist. Pt with significant difficulty sustaining her attention to therapist, appropriate conversation and navigated with a L lateral lean requiring cues to keep upright and mobilize in a straight line. Pt tolerated x 10 min of functional mobility with OT. Pt declined ADL's and transferred back to her bed with Joe MAE. Education provided on safety and decreasing her falls risk. Bilateral soft wrist restraints re-applied for safety. Per charts, pt seems to be going through withdrawal and this OT recommends continued skilled services with a TBD d/c plan pending progress.  Patient was wearing a face mask during this therapy encounter. Therapist used appropriate personal protective equipment including mask, goggles and gloves.  Mask used was standard procedure mask. Appropriate PPE was worn during the entire therapy session. Hand hygiene was completed before and after therapy session. Patient is not in enhanced droplet precautions.

## 2020-07-07 NOTE — THERAPY EVALUATION
Acute Care - Occupational Therapy Initial Evaluation  [facility]     Patient Name: Mary Chavez  : 1970  MRN: [id number]  Today's Date: 2020             Admit Date: 2020       ICD-10-CM ICD-9-CM   1. Seizures (CMS/HCC) R56.9 780.39   2. Post-ictal state (CMS/HCC) R56.9 780.39     Patient Active Problem List   Diagnosis   • Seizures (CMS/HCC)   • Hypopotassemia   • Opioid withdrawal delirium (CMS/HCC)     Past Medical History:   Diagnosis Date   • Seizures (CMS/HCC)      Past Surgical History:   Procedure Laterality Date   • BACK SURGERY     • GASTRIC BYPASS     • GASTRIC BYPASS     • SUBTOTAL HYSTERECTOMY            OT ASSESSMENT FLOWSHEET (last 12 hours)      Occupational Therapy Evaluation     Row Name 20 1520                   OT Evaluation Time/Intention    Subjective Information  complains of;fatigue  -RB        Document Type  evaluation  -RB        Mode of Treatment  individual therapy;occupational therapy  -RB        Patient Effort  fair  -RB           General Information    Patient Profile Reviewed?  yes  -RB        Patient Observations  alert;cooperative;agree to therapy  -RB        General Observations of Patient  CIWA, confused, restless  -RB        Prior Level of Function  independent:;ADL's  -RB        Existing Precautions/Restrictions  fall;seizures  -RB        Limitations/Impairments  safety/cognitive  -RB        Barriers to Rehab  cognitive status;medically complex  -RB           Relationship/Environment    Lives With  spouse  -RB        Family Caregiver if Needed  spouse  -RB           Resource/Environmental Concerns    Current Living Arrangements  home/apartment/condo  -RB        Resource/Environmental Concerns  none  -RB        Transportation Concerns  car, none  -RB           Cognitive Assessment/Intervention- PT/OT    Orientation Status (Cognition)  oriented to;person;place;situation  -RB        Follows Commands (Cognition)  25-49% accuracy;initiation  impaired;physical/tactile prompts required;repetition of directions required;verbal cues/prompting required  -RB        Safety Deficit (Cognitive)  severe deficit;safety precautions awareness;safety precautions follow-through/compliance;problem solving;judgment;insight into deficits/self awareness;impulsivity;awareness of need for assistance;at risk behavior observed;ability to follow commands  -RB           Safety Issues, Functional Mobility    Safety Issues Affecting Function (Mobility)  at risk behavior observed;awareness of need for assistance;impulsivity;insight into deficits/self awareness;judgment;positioning of assistive device;problem solving;safety precautions follow-through/compliance;safety precaution awareness;sequencing abilities;steps too close to assistive device  -RB        Impairments Affecting Function (Mobility)  balance;cognition;endurance/activity tolerance;coordination;strength;postural/trunk control  -RB           Bed Mobility Assessment/Treatment    Bed Mobility Assessment/Treatment  bed mobility (all) activities  -RB        [place] Level (Bed Mobility)  supervision  -RB        Bed Mobility, Safety Issues  cognitive deficits limit understanding  -RB        Assistive Device (Bed Mobility)  bed rails  -RB           Functional Mobility    Functional Mobility- Ind. Level  minimum assist (75% patient effort)  -RB        Functional Mobility- Device  -- hand held assist  -RB        Functional Mobility- Safety Issues  balance decreased during turns;sequencing ability decreased;loses balance backward  -RB        Functional Mobility- Comment  Impaired attention, following directions, balance and impulsivity requiring re-direction to her room.  -RB           Transfer Assessment/Treatment    Transfer Assessment/Treatment  sit-stand transfer;stand-sit transfer  -RB           Sit-Stand Transfer    Sit-Stand [place] (Transfers)  minimum assist (75% patient effort)  -           Stand-Sit  Transfer    Stand-Sit [place] (Transfers)  minimum assist (75% patient effort)  -RB           General ROM    GENERAL ROM COMMENTS  BUE WFL  -RB           MMT (Manual Muscle Testing)    General MMT Comments  BLE WFL  -RB           Sensory Assessment/Intervention    Sensory General Assessment  no sensation deficits identified  -RB           Positioning and Restraints    Pre-Treatment Position  standing in room Hallway with nursing  -RB        Post Treatment Position  bed  -RB        In Bed  notified nsg;supine;call light within reach;encouraged to call for assist;exit alarm on;side rails up x3 BUE restraints intact  -RB        Restraints  reapplied:  -RB           Pain Scale: Numbers Pre/Post-Treatment    Pain Scale: Numbers, Pretreatment  0/10 - no pain  -RB        Pain Scale: Numbers, Post-Treatment  0/10 - no pain  -RB           Wound 07/04/20 1943 Right forehead Laceration    Wound - Properties Group Date first assessed: 07/04/20  - Time first assessed: 1943  -SM Present on Hospital Admission: N  -SM Side: Right  -SM Location: forehead  -SM Primary Wound Type: Laceration  -SM, s/p fall  Number of Sutures Placed: 4  -SM       Wound 07/04/20 1943 midline forehead Laceration    Wound - Properties Group Date first assessed: 07/04/20  - Time first assessed: 1943  -SM Present on Hospital Admission: N  -SM Orientation: midline  -SM Location: forehead  -SM Primary Wound Type: Laceration  -SM, s/p 2nd fall 7/4/20  Additional Comments: LEYLA  -SM       Coping    Observed Emotional State  anxious;restless  -RB        Verbalized Emotional State  acceptance  -RB           Plan of Care Review    Plan of Care Reviewed With  patient  -RB        Progress  improving  -RB           Clinical Impression (OT)    Criteria for Skilled Therapeutic Interventions Met (OT Eval)  yes;treatment indicated  -RB        Rehab Potential (OT Eval)  good, to achieve stated therapy goals  -RB        Care Plan Review (OT)  patient/other agree to  care plan  -RB        Anticipated Discharge Disposition (OT)  other (see comments) TBD - pending improved cognition  -RB           Vital Signs    O2 Delivery Pre Treatment  room air  -RB        O2 Delivery Intra Treatment  room air  -RB        O2 Delivery Post Treatment  room air  -RB           Planned OT Interventions    Planned Therapy Interventions (OT Eval)  activity tolerance training;BADL retraining;cognitive/visual perception retraining;functional balance retraining;occupation/activity based interventions;patient/caregiver education/training;strengthening exercise;transfer/mobility retraining;neuromuscular control/coordination retraining  -RB           OT Goals    Bed Mobility Goal Selection (OT)  bed mobility, OT goal 1  -RB        Transfer Goal Selection (OT)  transfer, OT goal 1  -RB        Dressing Goal Selection (OT)  dressing, OT goal 1  -RB        Toileting Goal Selection (OT)  toileting, OT goal 1  -RB        Grooming Goal Selection (OT)  grooming, OT goal 1  -RB        Additional Documentation  Grooming Goal Selection (OT) (Row)  -RB           Bed Mobility Goal 1 (OT)    Activity/Assistive Device (Bed Mobility Goal 1, OT)  bed mobility activities, all  -RB        [place] Level/Cues Needed (Bed Mobility Goal 1, OT)  independent  -RB        Time Frame (Bed Mobility Goal 1, OT)  short term goal (STG);2 weeks  -RB        Progress/Outcomes (Bed Mobility Goal 1, OT)  continuing progress toward goal  -RB           Transfer Goal 1 (OT)    Activity/Assistive Device (Transfer Goal 1, OT)  transfers, all  -RB        [place] Level/Cues Needed (Transfer Goal 1, OT)  conditional independence  -RB        Time Frame (Transfer Goal 1, OT)  short term goal (STG);2 weeks  -RB        Progress/Outcome (Transfer Goal 1, OT)  continuing progress toward goal  -RB           Dressing Goal 1 (OT)    Activity/Assistive Device (Dressing Goal 1, OT)  dressing skills, all  -RB        [place]/Cues Needed (Dressing  Goal 1, OT)  independent  -RB        Time Frame (Dressing Goal 1, OT)  short term goal (STG);2 weeks  -RB        Progress/Outcome (Dressing Goal 1, OT)  continuing progress toward goal  -RB           Toileting Goal 1 (OT)    Activity/Device (Toileting Goal 1, OT)  toileting skills, all  -RB        [place] Level/Cues Needed (Toileting Goal 1, OT)  independent  -RB        Time Frame (Toileting Goal 1, OT)  short term goal (STG);2 weeks  -RB        Progress/Outcome (Toileting Goal 1, OT)  continuing progress toward goal  -RB           Grooming Goal 1 (OT)    Activity/Device (Grooming Goal 1, OT)  grooming skills, all  -RB        [place] (Grooming Goal 1, OT)  independent  -RB        Time Frame (Grooming Goal 1, OT)  short term goal (STG);2 weeks  -RB        Progress/Outcome (Grooming Goal 1, OT)  continuing progress toward goal  -RB           Living Environment    Home Accessibility  not wheelchair accessible  -RB          User Key  (r) = Recorded By, (t) = Taken By, (c) = Cosigned By    Initials Name Effective Dates    Melissa Nelson RN 06/16/16 -     RB Sharron Roach OT 04/02/20 -          Occupational Therapy Education                 Title: PT OT SLP Therapies (Not Started)     Topic: Occupational Therapy (Not Started)     Point: ADL training (Not Started)     Description:   Instruct learner(s) on proper safety adaptation and remediation techniques during self care or transfers.   Instruct in proper use of assistive devices.              Learner Progress:   Not documented in this visit.          Point: Home exercise program (Not Started)     Description:   Instruct learner(s) on appropriate technique for monitoring, assisting and/or progressing therapeutic exercises/activities.              Learner Progress:   Not documented in this visit.          Point: Precautions (Not Started)     Description:   Instruct learner(s) on prescribed precautions during self-care and functional transfers.               Learner Progress:   Not documented in this visit.          Point: Body mechanics (Not Started)     Description:   Instruct learner(s) on proper positioning and spine alignment during self-care, functional mobility activities and/or exercises.              Learner Progress:   Not documented in this visit.                              OT Recommendation and Plan  Outcome Summary/Treatment Plan (OT)  Anticipated Discharge Disposition (OT): other (see comments)(TBD - pending improved cognition)  Planned Therapy Interventions (OT Eval): activity tolerance training, BADL retraining, cognitive/visual perception retraining, functional balance retraining, occupation/activity based interventions, patient/caregiver education/training, strengthening exercise, transfer/mobility retraining, neuromuscular control/coordination retraining  Plan of Care Review  Plan of Care Reviewed With: patient  Plan of Care Reviewed With: patient    Outcome Measures     Row Name 07/07/20 1500             How much help from another is currently needed...    Putting on and taking off regular lower body clothing?  3  -RB      Bathing (including washing, rinsing, and drying)  3  -RB      Toileting (which includes using toilet bed pan or urinal)  3  -RB      Putting on and taking off regular upper body clothing  3  -RB      Taking care of personal grooming (such as brushing teeth)  3  -RB      Eating meals  3  -RB      AM-PAC 6 Clicks Score (OT)  18  -RB         Modified [place] Scale    Pre-Stroke Modified [place] Scale  1 - No significant disability despite symptoms.  Able to carry out all usual duties and activities.  -RB      Modified [place] Scale  4 - Moderately severe disability.  Unable to walk without assistance, and unable to attend to own bodily needs without assistance.  -RB         Functional Assessment    Outcome Measure Options  AM-PAC 6 Clicks Daily Activity (OT);Modified [place]  -RB        User Key  (r) = Recorded By, (t) = Taken By,  (c) = Cosigned By    Initials Name Provider Type    Sharron Greenwood OT Occupational Therapist          Time Calculation:   Time Calculation- OT     Row Name 07/07/20 1510             Time Calculation- OT    OT Start Time  1351  -RB      OT Stop Time  1415  -RB      OT Time Calculation (min)  24 min  -RB      Total Timed Code Minutes- OT  10 minute(s)  -RB      OT Received On  07/07/20  -RB      OT - Next Appointment  07/08/20  -RB      OT Goal Re-Cert Due Date  07/21/20  -        User Key  (r) = Recorded By, (t) = Taken By, (c) = Cosigned By    Initials Name Provider Type    Sharron Greenwood OT Occupational Therapist        Therapy Charges for Today     Code Description Service Date Service Provider Modifiers Qty    [id number]  OT EVAL MOD COMPLEXITY 2 7/7/2020 Sharron Roach OT GO 1    [id number]  OT THERAPEUTIC ACT EA 15 MIN 7/7/2020 Sharron Roach OT GO 1               Sharron Roach OT  7/7/2020

## 2020-07-08 ENCOUNTER — APPOINTMENT (OUTPATIENT)
Dept: MRI IMAGING | Facility: HOSPITAL | Age: 50
End: 2020-07-08

## 2020-07-08 LAB — HBA1C MFR BLD: 5.4 % (ref 4.8–5.6)

## 2020-07-08 PROCEDURE — 83036 HEMOGLOBIN GLYCOSYLATED A1C: CPT | Performed by: INTERNAL MEDICINE

## 2020-07-08 PROCEDURE — 25010000002 KETOROLAC TROMETHAMINE PER 15 MG: Performed by: INTERNAL MEDICINE

## 2020-07-08 RX ORDER — KETOROLAC TROMETHAMINE 30 MG/ML
30 INJECTION, SOLUTION INTRAMUSCULAR; INTRAVENOUS EVERY 6 HOURS PRN
Status: DISCONTINUED | OUTPATIENT
Start: 2020-07-08 | End: 2020-07-10 | Stop reason: HOSPADM

## 2020-07-08 RX ORDER — AMITRIPTYLINE HYDROCHLORIDE 50 MG/1
150 TABLET, FILM COATED ORAL EVERY 12 HOURS SCHEDULED
Status: DISCONTINUED | OUTPATIENT
Start: 2020-07-08 | End: 2020-07-08

## 2020-07-08 RX ORDER — AMITRIPTYLINE HYDROCHLORIDE 50 MG/1
150 TABLET, FILM COATED ORAL NIGHTLY
Status: DISCONTINUED | OUTPATIENT
Start: 2020-07-08 | End: 2020-07-10 | Stop reason: HOSPADM

## 2020-07-08 RX ORDER — DULOXETIN HYDROCHLORIDE 60 MG/1
60 CAPSULE, DELAYED RELEASE ORAL DAILY
Status: DISCONTINUED | OUTPATIENT
Start: 2020-07-08 | End: 2020-07-09

## 2020-07-08 RX ORDER — OLANZAPINE 10 MG/1
7.5 INJECTION, POWDER, LYOPHILIZED, FOR SOLUTION INTRAMUSCULAR EVERY 8 HOURS PRN
Status: DISCONTINUED | OUTPATIENT
Start: 2020-07-08 | End: 2020-07-10 | Stop reason: HOSPADM

## 2020-07-08 RX ADMIN — OLANZAPINE 7.5 MG: 10 INJECTION, POWDER, LYOPHILIZED, FOR SOLUTION INTRAMUSCULAR at 18:56

## 2020-07-08 RX ADMIN — SODIUM CHLORIDE, PRESERVATIVE FREE 10 ML: 5 INJECTION INTRAVENOUS at 20:43

## 2020-07-08 RX ADMIN — LEVETIRACETAM 1000 MG: 500 TABLET, FILM COATED ORAL at 20:44

## 2020-07-08 RX ADMIN — KETOROLAC TROMETHAMINE 30 MG: 30 INJECTION, SOLUTION INTRAMUSCULAR at 13:02

## 2020-07-08 RX ADMIN — LEVETIRACETAM 1000 MG: 500 TABLET, FILM COATED ORAL at 09:10

## 2020-07-08 RX ADMIN — NICOTINE 1 PATCH: 14 PATCH TRANSDERMAL at 09:11

## 2020-07-08 RX ADMIN — AMITRIPTYLINE HYDROCHLORIDE 150 MG: 50 TABLET, FILM COATED ORAL at 20:44

## 2020-07-08 RX ADMIN — DULOXETINE HYDROCHLORIDE 60 MG: 60 CAPSULE, DELAYED RELEASE ORAL at 13:02

## 2020-07-08 RX ADMIN — SODIUM CHLORIDE, PRESERVATIVE FREE 10 ML: 5 INJECTION INTRAVENOUS at 09:11

## 2020-07-08 NOTE — PLAN OF CARE
Problem: Patient Care Overview  Goal: Plan of Care Review  Outcome: Ongoing (interventions implemented as appropriate)  Flowsheets  Taken 7/8/2020 0598 by Fina Melo, RN  Progress: no change  Taken 7/7/2020 1520 by Sharron Roach OT  Plan of Care Reviewed With: patient  Taken 7/5/2020 0457 by Melissa Copeland, RN  Outcome Summary: pt with posey vest & julee soft wrist restraints & 1:1 sitter for safety, pt had 2 falls within few hrs of one another yesterday with injury, pulls at lines/tubes,removal of equipment, impulsive behavior, confusion, and withdrawl to possible opiates, benzo's, and/or etoh; access consulted, new PIV placed by IV RN last pm; pt very restless, anxious, agitated, impulsive, confusion, for first half of shift, pt CIWA max 26 & COWS max 14; pt receiving approx every 15-20 or 30min IV ativan 2mg per protocol, haldol x1, scheduled morphine all which were ineffective based on scores, Rapid Response called around 2125 after s/w APRN on call due to CIWA continued to elevated and at 26; after RR team on floor and d/w team of current hx known, s/s this shift, falls, medications given ; pt with intermittent approx 3-5mins of sleeping then arouses very restless, however, VSS, sats remain % RA, will continue to administer Ativan as ordered per protocol with monitoring of VS, if any changes will consult again; pt went to sleep around 0130 and has been sleeping majority of the time since with intermittent awakenings with restlessness; cont safety precautions, 1:1 sitter, and restraints; no sz activity, sz precautions ongoing; NSR on monitor.  Goal: Individualization and Mutuality  Outcome: Ongoing (interventions implemented as appropriate)     Problem: Restraint, Nonbehavioral (Nonviolent)  Goal: Rationale and Justification  Outcome: Ongoing (interventions implemented as appropriate)  Flowsheets (Taken 7/7/2020 6058 by Fina Melo, RN)  Rationale and Justification: prevent harm to self  Goal:  Nonbehavioral (Nonviolent) Restraint: Absence of Injury/Harm  Outcome: Ongoing (interventions implemented as appropriate)  Flowsheets (Taken 7/5/2020 1033 by Kathy Gillespie, RN)  Nonbehavioral (Nonviolent) Restraint: Absence of Injury/Harm: met  Goal: Nonbehavioral (Nonviolent) Restraint: Achievement of Discontinuation Criteria  Outcome: Ongoing (interventions implemented as appropriate)  Flowsheets (Taken 7/8/2020 1934)  Nonbehavioral (Nonviolent) Restraint: Achievement of Discontinuation Criteria: not met  Goal: Nonbehavioral (Nonviolent) Restraint: Preservation of Dignity and Wellbeing  Outcome: Ongoing (interventions implemented as appropriate)  Flowsheets (Taken 7/8/2020 1934)  Nonbehavioral (Nonviolent) Restraint: Preservation of Dignity and Wellbeing: met

## 2020-07-08 NOTE — PROGRESS NOTES
Name: Mary Chavez ADMIT: 2020   : 1970  PCP: Provider, No Known    MRN: [id number] LOS: 5 days   AGE/SEX: 49 y.o. female  ROOM: [facility]     Subjective   Subjective   Has not slept for 3 nights in a row.  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  That discharge note was reviewed.  She was not diagnosed with encephalitis at that time.  LP was done; CSF analysis negative for any significant findings.  Neurology followed her at that time    Review of Systems     Objective   Objective   Vital Signs  Temp:  [97.2 °F (36.2 °C)-97.9 °F (36.6 °C)] 97.9 °F (36.6 °C)  Heart Rate:  [] 90  Resp:  [18-20] 18  BP: (114-128)/(67-92) 128/92  SpO2:  [94 %-100 %] 100 %  on   ;   Device (Oxygen Therapy): room air  Body mass index is 27.31 kg/m².  Physical Exam   Constitutional: She appears well-developed and well-nourished. No distress.   HENT:   Head: Normocephalic.   Neck: Neck supple. No JVD present.   Cardiovascular: Normal rate and regular rhythm.   Pulmonary/Chest: No stridor. No respiratory distress. She has no wheezes. She has no rales.   Diminished but clear   Abdominal: Soft. Bowel sounds are normal. She exhibits no distension. There is no tenderness.   No hepatosplenomegaly   Musculoskeletal: She exhibits no edema.   Neurological: She is alert.   Oriented to self, place and year   Skin: Skin is warm. She is not diaphoretic.   Nursing note and vitals reviewed.      Results Review:       I reviewed the patient's new clinical results.  Results from last 7 days   Lab Units 20  1004 20  0906 20  0447 20  1838   WBC 10*3/mm3 7.60 7.75 7.60 7.51   HEMOGLOBIN g/dL 15.5 14.7 13.2 13.8   PLATELETS 10*3/mm3 318 359 367 451*     Results from last 7 days   Lab Units 20  0517 20  1004 20  0906 20  0447   SODIUM mmol/L 138 136 137 140   POTASSIUM mmol/L 3.6 3.3* 3.0* 3.7   CHLORIDE mmol/L 107 106 104  106   CO2 mmol/L 20.1* 18.2* 22.6 25.7   BUN mg/dL 7 6 8 11   CREATININE mg/dL 0.56* 0.59 0.80 0.68   GLUCOSE mg/dL 107* 108* 238* 93   Estimated Creatinine Clearance: 125.8 mL/min (A) (by C-G formula based on SCr of 0.56 mg/dL (L)).  Results from last 7 days   Lab Units 07/03/20  0447 07/02/20  1838   ALBUMIN g/dL 3.40* 3.70   BILIRUBIN mg/dL 0.4 0.3   ALK PHOS U/L 241* 270*   AST (SGOT) U/L 15 15   ALT (SGPT) U/L 15 15     Results from last 7 days   Lab Units 07/07/20  0517 07/05/20  1004 07/04/20  0906 07/03/20  0447 07/02/20  1838   CALCIUM mg/dL 9.1 8.5* 9.0 8.7 9.1   ALBUMIN g/dL  --   --   --  3.40* 3.70   MAGNESIUM mg/dL  --   --   --  2.3  --        Hemoglobin A1C   Date/Time Value Ref Range Status   07/08/2020 0544 5.40 4.80 - 5.60 % Final         amitriptyline 150 mg Oral Nightly   DULoxetine 60 mg Oral Daily   levETIRAcetam 1,000 mg Oral BID   lidocaine 5 mL Intradermal Once   nicotine 1 patch Transdermal Q24H   sodium chloride 10 mL Intravenous Q12H      Diet Regular       Assessment/Plan     Active Hospital Problems    Diagnosis  POA   • **Opioid withdrawal delirium (CMS/HCC) [F11.23]  Unknown   • Seizures (CMS/HCC) [R56.9]  Yes      Resolved Hospital Problems    Diagnosis Date Resolved POA   • Hypopotassemia [E87.6] 07/07/2020 Unknown       · Opioid withdrawal.  Still agitated and confused at times but a little better than yesterday.  Hope to be able to get out of restraints later today or tomorrow  · Chronic pain.  IV Toradol and Cymbalta started.  Check bladder scan to make sure no urinary retention.  She reported some hesitancy.  May need to decrease Elavil after all  · Seizure history.  Keppra dosage increased by neurology  · Insomnia.  I changed Elavil to 150 mg tonight.  She reported 300 mg at night but I do not want to give her that much of a dose especially now with the Cymbalta.  Consider stopping the Elavil and increasing Cymbalta in the near future  · Hyperglycemia.  A1c is  normal      Sydnie Vick MD  [facility]  07/08/20  18:22

## 2020-07-08 NOTE — PLAN OF CARE
Patient remains in posey and soft limb restraints. Patient does get up to the bathroom with assistance and is agreeable when the restraints are placed back on. 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 He was very open and stated that she does not use recreational drugs but that she goes through her prescribed pain pills and then they do buy pain pills off of the street to get her through the pain. Patient still has not slept. This is the 3rd night that she has not slept. She was not given any PRN meds throughout the night and has been somewhat pleasant.         Problem: Patient Care Overview  Goal: Plan of Care Review  Outcome: Ongoing (interventions implemented as appropriate)  Flowsheets (Taken 7/8/2020 0537)  Progress: no change  Goal: Individualization and Mutuality  Outcome: Ongoing (interventions implemented as appropriate)  Goal: Discharge Needs Assessment  Outcome: Ongoing (interventions implemented as appropriate)  Goal: Interprofessional Rounds/Family Conf  Outcome: Ongoing (interventions implemented as appropriate)     Problem: Fall Risk (Adult)  Goal: Absence of Fall  Outcome: Ongoing (interventions implemented as appropriate)     Problem: Skin Injury Risk (Adult)  Goal: Skin Health and Integrity  Outcome: Ongoing (interventions implemented as appropriate)     Problem: Seizure Disorder/Epilepsy (Adult)  Goal: Signs and Symptoms of Listed Potential Problems Will be Absent, Minimized or Managed (Seizure Disorder/Epilepsy)  Outcome: Ongoing (interventions implemented as appropriate)     Problem: Restraint, Nonbehavioral (Nonviolent)  Goal: Rationale and Justification  Outcome: Ongoing (interventions implemented as appropriate)  Goal: Nonbehavioral (Nonviolent) Restraint: Absence of Injury/Harm  Outcome: Ongoing (interventions implemented as appropriate)  Goal: Nonbehavioral  (Nonviolent) Restraint: Achievement of Discontinuation Criteria  Outcome: Ongoing (interventions implemented as appropriate)  Goal: Nonbehavioral (Nonviolent) Restraint: Preservation of Dignity and Wellbeing  Outcome: Ongoing (interventions implemented as appropriate)

## 2020-07-09 PROCEDURE — 25010000002 KETOROLAC TROMETHAMINE PER 15 MG: Performed by: INTERNAL MEDICINE

## 2020-07-09 RX ADMIN — LEVETIRACETAM 1000 MG: 500 TABLET, FILM COATED ORAL at 20:32

## 2020-07-09 RX ADMIN — NICOTINE 1 PATCH: 14 PATCH TRANSDERMAL at 08:26

## 2020-07-09 RX ADMIN — SODIUM CHLORIDE, PRESERVATIVE FREE 10 ML: 5 INJECTION INTRAVENOUS at 20:33

## 2020-07-09 RX ADMIN — POTASSIUM CHLORIDE 40 MEQ: 10 CAPSULE, COATED, EXTENDED RELEASE ORAL at 08:22

## 2020-07-09 RX ADMIN — OXYCODONE HYDROCHLORIDE AND ACETAMINOPHEN 1 TABLET: 7.5; 325 TABLET ORAL at 14:33

## 2020-07-09 RX ADMIN — LEVETIRACETAM 1000 MG: 500 TABLET, FILM COATED ORAL at 08:22

## 2020-07-09 RX ADMIN — AMITRIPTYLINE HYDROCHLORIDE 150 MG: 50 TABLET, FILM COATED ORAL at 20:32

## 2020-07-09 RX ADMIN — SODIUM CHLORIDE, PRESERVATIVE FREE 10 ML: 5 INJECTION INTRAVENOUS at 08:27

## 2020-07-09 RX ADMIN — DULOXETINE HYDROCHLORIDE 60 MG: 60 CAPSULE, DELAYED RELEASE ORAL at 08:23

## 2020-07-09 RX ADMIN — KETOROLAC TROMETHAMINE 30 MG: 30 INJECTION, SOLUTION INTRAMUSCULAR at 21:45

## 2020-07-09 RX ADMIN — OXYCODONE HYDROCHLORIDE AND ACETAMINOPHEN 1 TABLET: 7.5; 325 TABLET ORAL at 10:33

## 2020-07-09 RX ADMIN — KETOROLAC TROMETHAMINE 30 MG: 30 INJECTION, SOLUTION INTRAMUSCULAR at 08:23

## 2020-07-09 RX ADMIN — OXYCODONE HYDROCHLORIDE AND ACETAMINOPHEN 1 TABLET: 7.5; 325 TABLET ORAL at 18:33

## 2020-07-09 RX ADMIN — OXYCODONE HYDROCHLORIDE AND ACETAMINOPHEN 1 TABLET: 7.5; 325 TABLET ORAL at 06:32

## 2020-07-09 RX ADMIN — KETOROLAC TROMETHAMINE 30 MG: 30 INJECTION, SOLUTION INTRAMUSCULAR at 14:33

## 2020-07-09 NOTE — PLAN OF CARE
"Patient notes feeling mildly confused this am, states \"I was having a hard time putting the pieces together.\"  Patient has used appropriate judgement throughout day, when needing to go to the restroom and when requesting assistance from nursing staff.  Was able to discharge patients posey vest and non-violent restraints today.  Patient was able to walk around nurses station twice with steady gait and hands on assistance.  Patient did complain of headache earlier that was alleviated with prescribed pain medications.  Patient is alert and oriented x3 and much more to situation today.  Denies any CP or SOA.  Still complains of chronic back pain and BLE pain that has mild relief with prescribed pain medications. This nurse combed through patients hair and braided per patient request.  ABC's intact. VSS.  NAD noted at this time.  Bed alarm in place and verbalized understanding given per patient to call for help and to not get out of bed by herself.  Nicanor HELLER saw patient earlier today and if patient has uneventful night plan is for possible D/C tomorrow.  Will continue to monitor.   "

## 2020-07-09 NOTE — PROGRESS NOTES
Name: Mary Chavez ADMIT: 2020   : 1970  PCP: Provider, No Known    MRN: [id number] LOS: 6 days   AGE/SEX: 49 y.o. female  ROOM: [facility]     Subjective   Subjective   I saw the patient this morning.  She was calm and cooperative.  She had slept a couple hours last night.  She was out of wrist restraints and vest.  Hallucinations still present but diminishing.  No vomiting or diarrhea    Review of Systems     Objective   Objective   Vital Signs  Temp:  [97.4 °F (36.3 °C)-98.1 °F (36.7 °C)] 98.1 °F (36.7 °C)  Heart Rate:  [] 104  Resp:  [16-20] 18  BP: (110-165)/() 110/90  SpO2:  [93 %-100 %] 97 %  on   ;   Device (Oxygen Therapy): room air  Body mass index is 27.31 kg/m².  Physical Exam   Constitutional: She appears well-developed. No distress.   No diaphoresis   HENT:   Head: Normocephalic.   Pupils are not dilated   Cardiovascular: Normal rate and regular rhythm.   No murmur heard.  Pulmonary/Chest: No stridor. No respiratory distress. She has no wheezes.   Abdominal: Soft. Bowel sounds are normal. She exhibits no distension. There is no tenderness.   No hepatosplenomegaly   Musculoskeletal: She exhibits no edema.   No tremor   Neurological: She is alert.   Skin: Skin is warm. She is not diaphoretic.   Nursing note and vitals reviewed.      Results Review:       I reviewed the patient's new clinical results.  Results from last 7 days   Lab Units 20  1004 20  0906 20  0447 20  1838   WBC 10*3/mm3 7.60 7.75 7.60 7.51   HEMOGLOBIN g/dL 15.5 14.7 13.2 13.8   PLATELETS 10*3/mm3 318 359 367 451*     Results from last 7 days   Lab Units 20  0517 20  1004 20  0906 20  0447   SODIUM mmol/L 138 136 137 140   POTASSIUM mmol/L 3.6 3.3* 3.0* 3.7   CHLORIDE mmol/L 107 106 104 106   CO2 mmol/L 20.1* 18.2* 22.6 25.7   BUN mg/dL 7 6 8 11   CREATININE mg/dL 0.56* 0.59 0.80 0.68   GLUCOSE mg/dL 107* 108* 238* 93   Estimated Creatinine Clearance: 125.8 mL/min  (A) (by C-G formula based on SCr of 0.56 mg/dL (L)).  Results from last 7 days   Lab Units 07/03/20  0447 07/02/20  1838   ALBUMIN g/dL 3.40* 3.70   BILIRUBIN mg/dL 0.4 0.3   ALK PHOS U/L 241* 270*   AST (SGOT) U/L 15 15   ALT (SGPT) U/L 15 15     Results from last 7 days   Lab Units 07/07/20  0517 07/05/20  1004 07/04/20  0906 07/03/20  0447 07/02/20  1838   CALCIUM mg/dL 9.1 8.5* 9.0 8.7 9.1   ALBUMIN g/dL  --   --   --  3.40* 3.70   MAGNESIUM mg/dL  --   --   --  2.3  --        Hemoglobin A1C   Date/Time Value Ref Range Status   07/08/2020 0544 5.40 4.80 - 5.60 % Final         amitriptyline 150 mg Oral Nightly   DULoxetine 60 mg Oral Daily   levETIRAcetam 1,000 mg Oral BID   lidocaine 5 mL Intradermal Once   nicotine 1 patch Transdermal Q24H   sodium chloride 10 mL Intravenous Q12H      Diet Regular       Assessment/Plan     Active Hospital Problems    Diagnosis  POA   • **Opioid withdrawal delirium (CMS/HCC) [F11.23]  Unknown   • Seizures (CMS/HCC) [R56.9]  Yes      Resolved Hospital Problems    Diagnosis Date Resolved POA   • Hypopotassemia [E87.6] 07/07/2020 Unknown       · Opioid withdrawal with persistent but improving hallucinations.  Agitation diminishing.  In reviewing her chart, she is now back in restraints.  I wonder if this may be secondary to an interaction between Elavil and Cymbalta.  I have stopped the latter.  She really is not having much in the way of opiate withdrawal symptoms other than hallucinations, agitation and insomnia.  May need to ask psychiatrist to evaluate to see if something else is going on with persistent symptoms  · Chronic pain.  Toradol was started.  Stop Cymbalta secondary to above  · Seizure history.  Keppra dosage increased this admission by neurology      Sydnie Vick MD  [facility]  07/09/20  17:39

## 2020-07-09 NOTE — PROGRESS NOTES
Access follow up with chart review. Pt continues confused in restraints though cognition is showing some improvement. Access will give pt outpt resources when she is able to comprehend info.

## 2020-07-09 NOTE — PROGRESS NOTES
Continued Stay Note  [facility]     Patient Name: Mary Chavez  MRN: [id number]  Today's Date: 7/9/2020    Admit Date: 7/2/2020    Discharge Plan     Row Name 07/09/20 1240       Plan    Plan  Awaiting Medical stability for further dc planning; Home with spouse    Patient/Family in Agreement with Plan  yes    Plan Comments  Pt remains in restraints this morning but cognition improving. PT to see today and access following for resources once pt more oriented to discuss. CCP spoke with pts spouse Maxx via outbound call, introduced self and explained role. He states plan is home with him once medically ready. They have no steps to enter home and pt was IADL prior to admission.  Rj MUNOZ/CCP        Discharge Codes    No documentation.             Sudha Styles, RN

## 2020-07-09 NOTE — PLAN OF CARE
Problem: Patient Care Overview  Goal: Plan of Care Review  Outcome: Ongoing (interventions implemented as appropriate)  Flowsheets (Taken 7/9/2020 2409)  Outcome Summary: pt remains in posey vest and bilateral soft wrist restraints. attempted to take out of, pt did not tolerate. pt still very impulsive, hallucinating. BP's elevated but otherwise stable. SR-ST on monitor. on room air. up with assist x 1 to BR.  unable to complete MRI even with medication. pt sleeping on and off through night. no seizures during night. will CTM.

## 2020-07-10 VITALS
DIASTOLIC BLOOD PRESSURE: 93 MMHG | RESPIRATION RATE: 18 BRPM | WEIGHT: 169.2 LBS | HEIGHT: 66 IN | HEART RATE: 89 BPM | TEMPERATURE: 97.9 F | BODY MASS INDEX: 27.19 KG/M2 | OXYGEN SATURATION: 97 % | SYSTOLIC BLOOD PRESSURE: 127 MMHG

## 2020-07-10 PROBLEM — G89.4 CHRONIC PAIN SYNDROME: Status: ACTIVE | Noted: 2020-07-10

## 2020-07-10 LAB
ALBUMIN SERPL-MCNC: 3.3 G/DL (ref 3.5–5.2)
ALBUMIN/GLOB SERPL: 1.1 G/DL
ALP SERPL-CCNC: 187 U/L (ref 39–117)
ALT SERPL W P-5'-P-CCNC: 15 U/L (ref 1–33)
ANION GAP SERPL CALCULATED.3IONS-SCNC: 8.3 MMOL/L (ref 5–15)
AST SERPL-CCNC: 19 U/L (ref 1–32)
BILIRUB SERPL-MCNC: 0.5 MG/DL (ref 0–1.2)
BUN SERPL-MCNC: 18 MG/DL (ref 6–20)
BUN/CREAT SERPL: 29.5 (ref 7–25)
CALCIUM SPEC-SCNC: 9.1 MG/DL (ref 8.6–10.5)
CHLORIDE SERPL-SCNC: 105 MMOL/L (ref 98–107)
CO2 SERPL-SCNC: 23.7 MMOL/L (ref 22–29)
CREAT SERPL-MCNC: 0.61 MG/DL (ref 0.57–1)
DEPRECATED RDW RBC AUTO: 38.8 FL (ref 37–54)
ERYTHROCYTE [DISTWIDTH] IN BLOOD BY AUTOMATED COUNT: 12.3 % (ref 12.3–15.4)
GFR SERPL CREATININE-BSD FRML MDRD: 104 ML/MIN/1.73
GLOBULIN UR ELPH-MCNC: 2.9 GM/DL
GLUCOSE SERPL-MCNC: 111 MG/DL (ref 65–99)
HCT VFR BLD AUTO: 39.2 % (ref 34–46.6)
HGB BLD-MCNC: 13.4 G/DL (ref 12–15.9)
MCH RBC QN AUTO: 29.3 PG (ref 26.6–33)
MCHC RBC AUTO-ENTMCNC: 34.2 G/DL (ref 31.5–35.7)
MCV RBC AUTO: 85.8 FL (ref 79–97)
PLATELET # BLD AUTO: 333 10*3/MM3 (ref 140–450)
PMV BLD AUTO: 9.1 FL (ref 6–12)
POTASSIUM SERPL-SCNC: 3.7 MMOL/L (ref 3.5–5.2)
PROT SERPL-MCNC: 6.2 G/DL (ref 6–8.5)
RBC # BLD AUTO: 4.57 10*6/MM3 (ref 3.77–5.28)
SODIUM SERPL-SCNC: 137 MMOL/L (ref 136–145)
WBC # BLD AUTO: 8.17 10*3/MM3 (ref 3.4–10.8)

## 2020-07-10 PROCEDURE — 80053 COMPREHEN METABOLIC PANEL: CPT | Performed by: INTERNAL MEDICINE

## 2020-07-10 PROCEDURE — 85027 COMPLETE CBC AUTOMATED: CPT | Performed by: INTERNAL MEDICINE

## 2020-07-10 PROCEDURE — 25010000002 KETOROLAC TROMETHAMINE PER 15 MG: Performed by: INTERNAL MEDICINE

## 2020-07-10 RX ORDER — OXYCODONE AND ACETAMINOPHEN 7.5; 325 MG/1; MG/1
TABLET ORAL
Qty: 10 TABLET | Refills: 0 | Status: SHIPPED | OUTPATIENT
Start: 2020-07-10 | End: 2020-08-06 | Stop reason: HOSPADM

## 2020-07-10 RX ORDER — NICOTINE 21 MG/24HR
1 PATCH, TRANSDERMAL 24 HOURS TRANSDERMAL
Qty: 30 PATCH | Refills: 0 | Status: SHIPPED | OUTPATIENT
Start: 2020-07-11 | End: 2021-04-15 | Stop reason: HOSPADM

## 2020-07-10 RX ORDER — LEVETIRACETAM 1000 MG/1
1000 TABLET ORAL 2 TIMES DAILY
Qty: 60 TABLET | Refills: 0 | Status: SHIPPED | OUTPATIENT
Start: 2020-07-10

## 2020-07-10 RX ORDER — AMITRIPTYLINE HYDROCHLORIDE 150 MG/1
150 TABLET, FILM COATED ORAL NIGHTLY
Start: 2020-07-10

## 2020-07-10 RX ORDER — DULOXETIN HYDROCHLORIDE 30 MG/1
30 CAPSULE, DELAYED RELEASE ORAL DAILY
Qty: 30 CAPSULE | Refills: 0 | Status: SHIPPED | OUTPATIENT
Start: 2020-07-10

## 2020-07-10 RX ADMIN — OXYCODONE HYDROCHLORIDE AND ACETAMINOPHEN 1 TABLET: 7.5; 325 TABLET ORAL at 01:27

## 2020-07-10 RX ADMIN — LEVETIRACETAM 1000 MG: 500 TABLET, FILM COATED ORAL at 08:21

## 2020-07-10 RX ADMIN — KETOROLAC TROMETHAMINE 30 MG: 30 INJECTION, SOLUTION INTRAMUSCULAR at 11:37

## 2020-07-10 RX ADMIN — OXYCODONE HYDROCHLORIDE AND ACETAMINOPHEN 1 TABLET: 7.5; 325 TABLET ORAL at 08:21

## 2020-07-10 RX ADMIN — NICOTINE 1 PATCH: 14 PATCH TRANSDERMAL at 08:21

## 2020-07-10 NOTE — PLAN OF CARE
Problem: Patient Care Overview  Goal: Plan of Care Review  Outcome: Ongoing (interventions implemented as appropriate)  Flowsheets (Taken 7/10/2020 0510)  Progress: improving  Plan of Care Reviewed With: patient  Outcome Summary: pt slept for about 3 hours. Able to answer all orientation questions correctly. No hallucinations overinight. Pt remained out of restraints all night. Ambulated around nurses station twice. VSS. medicated for pain per MAR, with relief. SR on monitor. room air. no seizure activity. plan for pt to DC today.

## 2020-07-10 NOTE — NURSING NOTE
"Access center follow up:    Upon approach, pt is sitting in bed eating breakfast.  Pt is out of restraints, in good spirits and \"so excited\" to go home today.  She reports no hallucinations at this time.  Access center will sign off.  "
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
## 2020-07-10 NOTE — PROGRESS NOTES
Continued Stay Note  [facility]     Patient Name: Mary Chavez  MRN: [id number]  Today's Date: 7/10/2020    Admit Date: 7/2/2020    Discharge Plan     Row Name 07/10/20 1243       Plan    Plan  Home with spouse- Access provided outpt resources    Patient/Family in Agreement with Plan  yes    Plan Comments  CCP called pts pharmacy and they explained PCP is Dr. Murphy Carty of [facility] [phone]. CCP made follow up appointment for her on Monday July 13 2020 at 1:40pm. Added to avs and nurse to provide info. CCP recieved vm from [facility] with Access pt was provided outpt resources on Wednesday and they are at bedside. sameer chahal/ccp        Discharge Codes    No documentation.       Expected Discharge Date and Time     Expected Discharge Date Expected Discharge Time    Jul 10, 2020             Sudha Styles, RN

## 2020-07-10 NOTE — DISCHARGE INSTR - APPOINTMENTS
You have an appointment on Monday July 13th at 1:40pm with Dr. Murphy Carty at [facility]. Their number is [phone].

## 2020-07-11 ENCOUNTER — READMISSION MANAGEMENT (OUTPATIENT)
Dept: CALL CENTER | Facility: HOSPITAL | Age: 50
End: 2020-07-11

## 2020-07-11 NOTE — OUTREACH NOTE
Prep Survey      Responses   [facility] patient discharged from?  [place]   Is LACE score < 7 ?  No   Eligibility  Not Eligible   What are the reasons patient is not eligible?  Other [Opioid withdrawal delirium]   COVID-19 Test Status  Not tested   Does the patient have one of the following disease processes/diagnoses(primary or secondary)?  Other   Prep survey completed?  Yes          Tamiko Gillespie RN

## 2020-07-13 NOTE — PROGRESS NOTES
Case Management Discharge Note      Final Note: Home with spouse, MD follow up appointments and outpt substance use resources from JODEE Pandya RN/CCP    Provided Post Acute Provider List?: N/A  N/A Provider List Comment: Patient's spouse declined list at this time  Provided Post Acute Provider Quality & Resource List?: N/A  N/A Quality & Resource List Comment: Patient's spouse declined ratings at this time    Destination      No service has been selected for the patient.      Durable Medical Equipment      No service has been selected for the patient.      Dialysis/Infusion      No service has been selected for the patient.      Home Medical Care      No service has been selected for the patient.      Therapy      No service has been selected for the patient.      Community Resources      No service has been selected for the patient.        Transportation Services  Private: Car    Final Discharge Disposition Code: 01 - home or self-care

## 2020-07-24 ENCOUNTER — HOSPITAL ENCOUNTER (INPATIENT)
Facility: HOSPITAL | Age: 50
LOS: 13 days | Discharge: HOME | End: 2020-08-06
Attending: EMERGENCY MEDICINE | Admitting: INTERNAL MEDICINE

## 2020-07-24 DIAGNOSIS — F19.11 HISTORY OF DRUG ABUSE (HCC): ICD-10-CM

## 2020-07-24 DIAGNOSIS — Z87.898 HISTORY OF SEIZURES: ICD-10-CM

## 2020-07-24 DIAGNOSIS — T24.301A FULL THICKNESS BURN OF RIGHT LOWER EXTREMITY, INITIAL ENCOUNTER: Primary | ICD-10-CM

## 2020-07-24 DIAGNOSIS — T24.301D FULL THICKNESS BURN OF RIGHT LOWER EXTREMITY, SUBSEQUENT ENCOUNTER: ICD-10-CM

## 2020-07-24 DIAGNOSIS — T22.391A: ICD-10-CM

## 2020-07-24 PROBLEM — D63.8 ANEMIA, CHRONIC DISEASE: Status: ACTIVE | Noted: 2020-07-24

## 2020-07-24 PROBLEM — D72.829 LEUKOCYTOSIS: Status: ACTIVE | Noted: 2020-07-24

## 2020-07-24 PROBLEM — R73.9 HYPERGLYCEMIA: Status: ACTIVE | Noted: 2020-07-24

## 2020-07-24 PROBLEM — E88.09 HYPOALBUMINEMIA: Status: ACTIVE | Noted: 2020-07-24

## 2020-07-24 PROBLEM — E87.1 HYPONATREMIA: Status: ACTIVE | Noted: 2020-07-24

## 2020-07-24 PROBLEM — Z72.0 TOBACCO ABUSE: Status: ACTIVE | Noted: 2020-07-24

## 2020-07-24 LAB
ALBUMIN SERPL-MCNC: 2.9 G/DL (ref 3.5–5.2)
ALBUMIN/GLOB SERPL: 0.7 G/DL
ALP SERPL-CCNC: 181 U/L (ref 39–117)
ALT SERPL W P-5'-P-CCNC: 11 U/L (ref 1–33)
AMPHET+METHAMPHET UR QL: NEGATIVE
ANION GAP SERPL CALCULATED.3IONS-SCNC: 10.5 MMOL/L (ref 5–15)
AST SERPL-CCNC: 16 U/L (ref 1–32)
BARBITURATES UR QL SCN: NEGATIVE
BASOPHILS # BLD AUTO: 0.04 10*3/MM3 (ref 0–0.2)
BASOPHILS NFR BLD AUTO: 0.3 % (ref 0–1.5)
BENZODIAZ UR QL SCN: NEGATIVE
BILIRUB SERPL-MCNC: 0.3 MG/DL (ref 0–1.2)
BILIRUB UR QL STRIP: NEGATIVE
BUN SERPL-MCNC: 10 MG/DL (ref 6–20)
BUN/CREAT SERPL: 16.9 (ref 7–25)
CALCIUM SPEC-SCNC: 9.5 MG/DL (ref 8.6–10.5)
CANNABINOIDS SERPL QL: NEGATIVE
CHLORIDE SERPL-SCNC: 95 MMOL/L (ref 98–107)
CK SERPL-CCNC: 65 U/L (ref 20–180)
CLARITY UR: CLEAR
CO2 SERPL-SCNC: 26.5 MMOL/L (ref 22–29)
COCAINE UR QL: NEGATIVE
COLOR UR: YELLOW
CREAT SERPL-MCNC: 0.59 MG/DL (ref 0.57–1)
D-LACTATE SERPL-SCNC: 0.9 MMOL/L (ref 0.5–2)
DEPRECATED RDW RBC AUTO: 37.1 FL (ref 37–54)
EOSINOPHIL # BLD AUTO: 0.04 10*3/MM3 (ref 0–0.4)
EOSINOPHIL NFR BLD AUTO: 0.3 % (ref 0.3–6.2)
ERYTHROCYTE [DISTWIDTH] IN BLOOD BY AUTOMATED COUNT: 12.3 % (ref 12.3–15.4)
ETHANOL BLD-MCNC: <10 MG/DL (ref 0–10)
ETHANOL UR QL: <0.01 %
GFR SERPL CREATININE-BSD FRML MDRD: 108 ML/MIN/1.73
GLOBULIN UR ELPH-MCNC: 3.9 GM/DL
GLUCOSE SERPL-MCNC: 118 MG/DL (ref 65–99)
GLUCOSE UR STRIP-MCNC: NEGATIVE MG/DL
HCT VFR BLD AUTO: 32 % (ref 34–46.6)
HGB BLD-MCNC: 10.9 G/DL (ref 12–15.9)
HGB UR QL STRIP.AUTO: NEGATIVE
IMM GRANULOCYTES # BLD AUTO: 0.1 10*3/MM3 (ref 0–0.05)
IMM GRANULOCYTES NFR BLD AUTO: 0.9 % (ref 0–0.5)
KETONES UR QL STRIP: NEGATIVE
LEUKOCYTE ESTERASE UR QL STRIP.AUTO: NEGATIVE
LYMPHOCYTES # BLD AUTO: 1.22 10*3/MM3 (ref 0.7–3.1)
LYMPHOCYTES NFR BLD AUTO: 10.5 % (ref 19.6–45.3)
MAGNESIUM SERPL-MCNC: 2.1 MG/DL (ref 1.6–2.6)
MCH RBC QN AUTO: 28.5 PG (ref 26.6–33)
MCHC RBC AUTO-ENTMCNC: 34.1 G/DL (ref 31.5–35.7)
MCV RBC AUTO: 83.6 FL (ref 79–97)
METHADONE UR QL SCN: NEGATIVE
MONOCYTES # BLD AUTO: 0.72 10*3/MM3 (ref 0.1–0.9)
MONOCYTES NFR BLD AUTO: 6.2 % (ref 5–12)
NEUTROPHILS NFR BLD AUTO: 81.8 % (ref 42.7–76)
NEUTROPHILS NFR BLD AUTO: 9.47 10*3/MM3 (ref 1.7–7)
NITRITE UR QL STRIP: NEGATIVE
NRBC BLD AUTO-RTO: 0 /100 WBC (ref 0–0.2)
OPIATES UR QL: POSITIVE
OXYCODONE UR QL SCN: NEGATIVE
PH UR STRIP.AUTO: 6 [PH] (ref 5–8)
PLATELET # BLD AUTO: 410 10*3/MM3 (ref 140–450)
PMV BLD AUTO: 8.7 FL (ref 6–12)
POTASSIUM SERPL-SCNC: 3.6 MMOL/L (ref 3.5–5.2)
PROCALCITONIN SERPL-MCNC: 0.07 NG/ML (ref 0–0.25)
PROT SERPL-MCNC: 6.8 G/DL (ref 6–8.5)
PROT UR QL STRIP: NEGATIVE
RBC # BLD AUTO: 3.83 10*6/MM3 (ref 3.77–5.28)
SODIUM SERPL-SCNC: 132 MMOL/L (ref 136–145)
SP GR UR STRIP: 1.01 (ref 1–1.03)
UROBILINOGEN UR QL STRIP: NORMAL
WBC # BLD AUTO: 11.59 10*3/MM3 (ref 3.4–10.8)

## 2020-07-24 PROCEDURE — G0378 HOSPITAL OBSERVATION PER HR: HCPCS

## 2020-07-24 PROCEDURE — 80307 DRUG TEST PRSMV CHEM ANLYZR: CPT | Performed by: EMERGENCY MEDICINE

## 2020-07-24 PROCEDURE — 82550 ASSAY OF CK (CPK): CPT | Performed by: EMERGENCY MEDICINE

## 2020-07-24 PROCEDURE — 87205 SMEAR GRAM STAIN: CPT | Performed by: EMERGENCY MEDICINE

## 2020-07-24 PROCEDURE — 87147 CULTURE TYPE IMMUNOLOGIC: CPT | Performed by: EMERGENCY MEDICINE

## 2020-07-24 PROCEDURE — 80053 COMPREHEN METABOLIC PANEL: CPT | Performed by: EMERGENCY MEDICINE

## 2020-07-24 PROCEDURE — 81003 URINALYSIS AUTO W/O SCOPE: CPT | Performed by: EMERGENCY MEDICINE

## 2020-07-24 PROCEDURE — 25010000002 CEFTRIAXONE PER 250 MG: Performed by: NURSE PRACTITIONER

## 2020-07-24 PROCEDURE — 85025 COMPLETE CBC W/AUTO DIFF WBC: CPT | Performed by: EMERGENCY MEDICINE

## 2020-07-24 PROCEDURE — 25010000002 CEFTRIAXONE PER 250 MG: Performed by: SURGERY

## 2020-07-24 PROCEDURE — 87040 BLOOD CULTURE FOR BACTERIA: CPT | Performed by: EMERGENCY MEDICINE

## 2020-07-24 PROCEDURE — 25010000002 VANCOMYCIN 10 G RECONSTITUTED SOLUTION: Performed by: EMERGENCY MEDICINE

## 2020-07-24 PROCEDURE — 99284 EMERGENCY DEPT VISIT MOD MDM: CPT

## 2020-07-24 PROCEDURE — 87070 CULTURE OTHR SPECIMN AEROBIC: CPT | Performed by: EMERGENCY MEDICINE

## 2020-07-24 PROCEDURE — 83605 ASSAY OF LACTIC ACID: CPT | Performed by: EMERGENCY MEDICINE

## 2020-07-24 PROCEDURE — 84145 PROCALCITONIN (PCT): CPT | Performed by: EMERGENCY MEDICINE

## 2020-07-24 PROCEDURE — 87186 SC STD MICRODIL/AGAR DIL: CPT | Performed by: EMERGENCY MEDICINE

## 2020-07-24 PROCEDURE — 25010000002 CEFEPIME PER 500 MG: Performed by: EMERGENCY MEDICINE

## 2020-07-24 PROCEDURE — 83735 ASSAY OF MAGNESIUM: CPT | Performed by: EMERGENCY MEDICINE

## 2020-07-24 RX ORDER — DULOXETIN HYDROCHLORIDE 30 MG/1
30 CAPSULE, DELAYED RELEASE ORAL DAILY
Status: DISCONTINUED | OUTPATIENT
Start: 2020-07-24 | End: 2020-07-30

## 2020-07-24 RX ORDER — ONDANSETRON 2 MG/ML
4 INJECTION INTRAMUSCULAR; INTRAVENOUS EVERY 6 HOURS PRN
Status: DISCONTINUED | OUTPATIENT
Start: 2020-07-24 | End: 2020-08-06 | Stop reason: HOSPADM

## 2020-07-24 RX ORDER — CEFTRIAXONE SODIUM 1 G/50ML
1 INJECTION, SOLUTION INTRAVENOUS EVERY 24 HOURS
Status: COMPLETED | OUTPATIENT
Start: 2020-07-24 | End: 2020-07-28

## 2020-07-24 RX ORDER — VANCOMYCIN HYDROCHLORIDE 1 G/200ML
1000 INJECTION, SOLUTION INTRAVENOUS EVERY 12 HOURS
Status: DISCONTINUED | OUTPATIENT
Start: 2020-07-25 | End: 2020-07-26

## 2020-07-24 RX ORDER — AMITRIPTYLINE HYDROCHLORIDE 50 MG/1
150 TABLET, FILM COATED ORAL NIGHTLY
Status: DISCONTINUED | OUTPATIENT
Start: 2020-07-24 | End: 2020-07-26

## 2020-07-24 RX ORDER — TRAMADOL HYDROCHLORIDE 50 MG/1
50 TABLET ORAL EVERY 6 HOURS PRN
Status: DISCONTINUED | OUTPATIENT
Start: 2020-07-24 | End: 2020-07-25

## 2020-07-24 RX ORDER — NICOTINE 21 MG/24HR
1 PATCH, TRANSDERMAL 24 HOURS TRANSDERMAL
Status: DISCONTINUED | OUTPATIENT
Start: 2020-07-24 | End: 2020-08-06 | Stop reason: HOSPADM

## 2020-07-24 RX ORDER — DOCUSATE SODIUM 100 MG/1
100 CAPSULE, LIQUID FILLED ORAL 2 TIMES DAILY PRN
Status: DISCONTINUED | OUTPATIENT
Start: 2020-07-24 | End: 2020-08-06 | Stop reason: HOSPADM

## 2020-07-24 RX ORDER — LEVETIRACETAM 500 MG/1
1000 TABLET ORAL 2 TIMES DAILY
Status: DISCONTINUED | OUTPATIENT
Start: 2020-07-24 | End: 2020-08-06 | Stop reason: HOSPADM

## 2020-07-24 RX ORDER — ALBUTEROL SULFATE 2.5 MG/3ML
2.5 SOLUTION RESPIRATORY (INHALATION) EVERY 6 HOURS PRN
Status: DISCONTINUED | OUTPATIENT
Start: 2020-07-24 | End: 2020-08-06 | Stop reason: HOSPADM

## 2020-07-24 RX ORDER — SODIUM CHLORIDE 0.9 % (FLUSH) 0.9 %
10 SYRINGE (ML) INJECTION EVERY 12 HOURS SCHEDULED
Status: DISCONTINUED | OUTPATIENT
Start: 2020-07-24 | End: 2020-08-06 | Stop reason: HOSPADM

## 2020-07-24 RX ORDER — ONDANSETRON 4 MG/1
4 TABLET, FILM COATED ORAL EVERY 6 HOURS PRN
Status: DISCONTINUED | OUTPATIENT
Start: 2020-07-24 | End: 2020-08-06 | Stop reason: HOSPADM

## 2020-07-24 RX ORDER — SODIUM CHLORIDE 0.9 % (FLUSH) 0.9 %
10 SYRINGE (ML) INJECTION AS NEEDED
Status: DISCONTINUED | OUTPATIENT
Start: 2020-07-24 | End: 2020-08-06 | Stop reason: HOSPADM

## 2020-07-24 RX ADMIN — SODIUM CHLORIDE, PRESERVATIVE FREE 10 ML: 5 INJECTION INTRAVENOUS at 21:14

## 2020-07-24 RX ADMIN — CEFTRIAXONE SODIUM 1 G: 1 INJECTION, SOLUTION INTRAVENOUS at 16:54

## 2020-07-24 RX ADMIN — TRAMADOL HYDROCHLORIDE 50 MG: 50 TABLET, FILM COATED ORAL at 14:38

## 2020-07-24 RX ADMIN — VANCOMYCIN HYDROCHLORIDE 1500 MG: 10 INJECTION, POWDER, LYOPHILIZED, FOR SOLUTION INTRAVENOUS at 12:36

## 2020-07-24 RX ADMIN — LEVETIRACETAM 1000 MG: 500 TABLET, FILM COATED ORAL at 21:13

## 2020-07-24 RX ADMIN — CEFEPIME HYDROCHLORIDE 2 G: 2 INJECTION, POWDER, FOR SOLUTION INTRAVENOUS at 11:47

## 2020-07-24 RX ADMIN — AMITRIPTYLINE HYDROCHLORIDE 150 MG: 50 TABLET, FILM COATED ORAL at 21:13

## 2020-07-24 RX ADMIN — TRAMADOL HYDROCHLORIDE 50 MG: 50 TABLET, FILM COATED ORAL at 21:13

## 2020-07-24 RX ADMIN — DULOXETINE HYDROCHLORIDE 30 MG: 30 CAPSULE, DELAYED RELEASE ORAL at 16:54

## 2020-07-24 RX ADMIN — NICOTINE 1 PATCH: 14 PATCH TRANSDERMAL at 16:55

## 2020-07-25 LAB
ANION GAP SERPL CALCULATED.3IONS-SCNC: 8.3 MMOL/L (ref 5–15)
B PARAPERT DNA SPEC QL NAA+PROBE: NOT DETECTED
B PERT DNA SPEC QL NAA+PROBE: NOT DETECTED
BUN SERPL-MCNC: 7 MG/DL (ref 6–20)
BUN/CREAT SERPL: 13 (ref 7–25)
C PNEUM DNA NPH QL NAA+NON-PROBE: NOT DETECTED
CALCIUM SPEC-SCNC: 8.8 MG/DL (ref 8.6–10.5)
CHLORIDE SERPL-SCNC: 100 MMOL/L (ref 98–107)
CO2 SERPL-SCNC: 27.7 MMOL/L (ref 22–29)
CREAT SERPL-MCNC: 0.54 MG/DL (ref 0.57–1)
DEPRECATED RDW RBC AUTO: 39.2 FL (ref 37–54)
ERYTHROCYTE [DISTWIDTH] IN BLOOD BY AUTOMATED COUNT: 12.7 % (ref 12.3–15.4)
FLUAV H1 2009 PAND RNA NPH QL NAA+PROBE: NOT DETECTED
FLUAV H1 HA GENE NPH QL NAA+PROBE: NOT DETECTED
FLUAV H3 RNA NPH QL NAA+PROBE: NOT DETECTED
FLUAV SUBTYP SPEC NAA+PROBE: NOT DETECTED
FLUBV RNA ISLT QL NAA+PROBE: NOT DETECTED
GFR SERPL CREATININE-BSD FRML MDRD: 120 ML/MIN/1.73
GLUCOSE SERPL-MCNC: 99 MG/DL (ref 65–99)
HADV DNA SPEC NAA+PROBE: NOT DETECTED
HCOV 229E RNA SPEC QL NAA+PROBE: NOT DETECTED
HCOV HKU1 RNA SPEC QL NAA+PROBE: NOT DETECTED
HCOV NL63 RNA SPEC QL NAA+PROBE: NOT DETECTED
HCOV OC43 RNA SPEC QL NAA+PROBE: NOT DETECTED
HCT VFR BLD AUTO: 33.7 % (ref 34–46.6)
HGB BLD-MCNC: 11.4 G/DL (ref 12–15.9)
HMPV RNA NPH QL NAA+NON-PROBE: NOT DETECTED
HPIV1 RNA SPEC QL NAA+PROBE: NOT DETECTED
HPIV2 RNA SPEC QL NAA+PROBE: NOT DETECTED
HPIV3 RNA NPH QL NAA+PROBE: NOT DETECTED
HPIV4 P GENE NPH QL NAA+PROBE: NOT DETECTED
IRON 24H UR-MRATE: 25 MCG/DL (ref 37–145)
IRON SATN MFR SERPL: 15 % (ref 20–50)
M PNEUMO IGG SER IA-ACNC: NOT DETECTED
MCH RBC QN AUTO: 29.1 PG (ref 26.6–33)
MCHC RBC AUTO-ENTMCNC: 33.8 G/DL (ref 31.5–35.7)
MCV RBC AUTO: 86 FL (ref 79–97)
PLATELET # BLD AUTO: 425 10*3/MM3 (ref 140–450)
PMV BLD AUTO: 8.5 FL (ref 6–12)
POTASSIUM SERPL-SCNC: 2.9 MMOL/L (ref 3.5–5.2)
RBC # BLD AUTO: 3.92 10*6/MM3 (ref 3.77–5.28)
RHINOVIRUS RNA SPEC NAA+PROBE: NOT DETECTED
RSV RNA NPH QL NAA+NON-PROBE: NOT DETECTED
SARS-COV-2 RNA NPH QL NAA+NON-PROBE: NOT DETECTED
SODIUM SERPL-SCNC: 136 MMOL/L (ref 136–145)
TIBC SERPL-MCNC: 171 MCG/DL (ref 298–536)
TRANSFERRIN SERPL-MCNC: 115 MG/DL (ref 200–360)
WBC # BLD AUTO: 12.18 10*3/MM3 (ref 3.4–10.8)

## 2020-07-25 PROCEDURE — 0202U NFCT DS 22 TRGT SARS-COV-2: CPT | Performed by: SURGERY

## 2020-07-25 PROCEDURE — 25010000002 CEFTRIAXONE PER 250 MG: Performed by: SURGERY

## 2020-07-25 PROCEDURE — 25010000002 CEFTRIAXONE PER 250 MG: Performed by: NURSE PRACTITIONER

## 2020-07-25 PROCEDURE — 25010000002 VANCOMYCIN PER 500 MG: Performed by: NURSE PRACTITIONER

## 2020-07-25 PROCEDURE — 84466 ASSAY OF TRANSFERRIN: CPT | Performed by: NURSE PRACTITIONER

## 2020-07-25 PROCEDURE — 25010000002 VANCOMYCIN PER 500 MG: Performed by: SURGERY

## 2020-07-25 PROCEDURE — 80048 BASIC METABOLIC PNL TOTAL CA: CPT | Performed by: NURSE PRACTITIONER

## 2020-07-25 PROCEDURE — 83540 ASSAY OF IRON: CPT | Performed by: NURSE PRACTITIONER

## 2020-07-25 PROCEDURE — 85027 COMPLETE CBC AUTOMATED: CPT | Performed by: NURSE PRACTITIONER

## 2020-07-25 RX ORDER — POTASSIUM CHLORIDE 750 MG/1
40 CAPSULE, EXTENDED RELEASE ORAL AS NEEDED
Status: DISCONTINUED | OUTPATIENT
Start: 2020-07-25 | End: 2020-08-06 | Stop reason: HOSPADM

## 2020-07-25 RX ORDER — ACETAMINOPHEN 325 MG/1
650 TABLET ORAL EVERY 6 HOURS PRN
Status: DISCONTINUED | OUTPATIENT
Start: 2020-07-25 | End: 2020-08-06 | Stop reason: HOSPADM

## 2020-07-25 RX ORDER — OXYCODONE HYDROCHLORIDE AND ACETAMINOPHEN 5; 325 MG/1; MG/1
1 TABLET ORAL EVERY 8 HOURS PRN
Status: DISCONTINUED | OUTPATIENT
Start: 2020-07-25 | End: 2020-07-26

## 2020-07-25 RX ORDER — OXYCODONE HYDROCHLORIDE AND ACETAMINOPHEN 5; 325 MG/1; MG/1
2 TABLET ORAL EVERY 8 HOURS PRN
Status: DISCONTINUED | OUTPATIENT
Start: 2020-08-04 | End: 2020-07-26

## 2020-07-25 RX ORDER — POTASSIUM CHLORIDE 1.5 G/1.77G
40 POWDER, FOR SOLUTION ORAL AS NEEDED
Status: DISCONTINUED | OUTPATIENT
Start: 2020-07-25 | End: 2020-08-06 | Stop reason: HOSPADM

## 2020-07-25 RX ORDER — OXYCODONE HYDROCHLORIDE AND ACETAMINOPHEN 5; 325 MG/1; MG/1
2 TABLET ORAL EVERY 8 HOURS PRN
Status: DISCONTINUED | OUTPATIENT
Start: 2020-07-25 | End: 2020-07-25

## 2020-07-25 RX ORDER — ACETAMINOPHEN 325 MG/1
650 TABLET ORAL ONCE
Status: COMPLETED | OUTPATIENT
Start: 2020-07-25 | End: 2020-07-25

## 2020-07-25 RX ADMIN — NICOTINE 1 PATCH: 14 PATCH TRANSDERMAL at 11:47

## 2020-07-25 RX ADMIN — SILVER SULFADIAZINE: 10 CREAM TOPICAL at 14:43

## 2020-07-25 RX ADMIN — TRAMADOL HYDROCHLORIDE 50 MG: 50 TABLET, FILM COATED ORAL at 03:36

## 2020-07-25 RX ADMIN — DULOXETINE HYDROCHLORIDE 30 MG: 30 CAPSULE, DELAYED RELEASE ORAL at 11:47

## 2020-07-25 RX ADMIN — POTASSIUM CHLORIDE 40 MEQ: 10 CAPSULE, COATED, EXTENDED RELEASE ORAL at 16:17

## 2020-07-25 RX ADMIN — LEVETIRACETAM 1000 MG: 500 TABLET, FILM COATED ORAL at 11:47

## 2020-07-25 RX ADMIN — ACETAMINOPHEN 650 MG: 325 TABLET, FILM COATED ORAL at 11:48

## 2020-07-25 RX ADMIN — VANCOMYCIN HYDROCHLORIDE 1000 MG: 1 INJECTION, SOLUTION INTRAVENOUS at 23:58

## 2020-07-25 RX ADMIN — TRAMADOL HYDROCHLORIDE 50 MG: 50 TABLET, FILM COATED ORAL at 09:34

## 2020-07-25 RX ADMIN — ACETAMINOPHEN 650 MG: 325 TABLET, FILM COATED ORAL at 06:10

## 2020-07-25 RX ADMIN — POTASSIUM CHLORIDE 40 MEQ: 10 CAPSULE, COATED, EXTENDED RELEASE ORAL at 22:32

## 2020-07-25 RX ADMIN — VANCOMYCIN HYDROCHLORIDE 1000 MG: 1 INJECTION, SOLUTION INTRAVENOUS at 11:54

## 2020-07-25 RX ADMIN — OXYCODONE HYDROCHLORIDE AND ACETAMINOPHEN 1 TABLET: 5; 325 TABLET ORAL at 16:16

## 2020-07-25 RX ADMIN — SODIUM CHLORIDE, PRESERVATIVE FREE 10 ML: 5 INJECTION INTRAVENOUS at 11:49

## 2020-07-25 RX ADMIN — COLLAGENASE SANTYL: 250 OINTMENT TOPICAL at 11:47

## 2020-07-25 RX ADMIN — VANCOMYCIN HYDROCHLORIDE 1000 MG: 1 INJECTION, SOLUTION INTRAVENOUS at 00:07

## 2020-07-25 RX ADMIN — SILVER SULFADIAZINE: 10 CREAM TOPICAL at 23:59

## 2020-07-25 RX ADMIN — CEFTRIAXONE SODIUM 1 G: 1 INJECTION, SOLUTION INTRAVENOUS at 15:44

## 2020-07-25 RX ADMIN — AMITRIPTYLINE HYDROCHLORIDE 150 MG: 50 TABLET, FILM COATED ORAL at 22:32

## 2020-07-25 RX ADMIN — SODIUM CHLORIDE, PRESERVATIVE FREE 10 ML: 5 INJECTION INTRAVENOUS at 22:32

## 2020-07-25 RX ADMIN — OXYCODONE HYDROCHLORIDE AND ACETAMINOPHEN 1 TABLET: 5; 325 TABLET ORAL at 23:59

## 2020-07-25 RX ADMIN — POTASSIUM CHLORIDE 40 MEQ: 10 CAPSULE, COATED, EXTENDED RELEASE ORAL at 11:48

## 2020-07-25 RX ADMIN — LEVETIRACETAM 1000 MG: 500 TABLET, FILM COATED ORAL at 22:32

## 2020-07-26 ENCOUNTER — ANESTHESIA (OUTPATIENT)
Dept: PERIOP | Facility: HOSPITAL | Age: 50
End: 2020-07-26

## 2020-07-26 ENCOUNTER — ANESTHESIA EVENT (OUTPATIENT)
Dept: PERIOP | Facility: HOSPITAL | Age: 50
End: 2020-07-26

## 2020-07-26 LAB
BACTERIA SPEC AEROBE CULT: ABNORMAL
BACTERIA SPEC AEROBE CULT: ABNORMAL
GRAM STN SPEC: ABNORMAL
POTASSIUM SERPL-SCNC: 4.7 MMOL/L (ref 3.5–5.2)
VANCOMYCIN SERPL-MCNC: 18 MCG/ML (ref 5–40)

## 2020-07-26 PROCEDURE — 25010000002 VANCOMYCIN 10 G RECONSTITUTED SOLUTION: Performed by: INTERNAL MEDICINE

## 2020-07-26 PROCEDURE — 0HBHXZZ EXCISION OF RIGHT UPPER LEG SKIN, EXTERNAL APPROACH: ICD-10-PCS | Performed by: SURGERY

## 2020-07-26 PROCEDURE — 80202 ASSAY OF VANCOMYCIN: CPT | Performed by: INTERNAL MEDICINE

## 2020-07-26 PROCEDURE — 25010000002 DEXAMETHASONE PER 1 MG: Performed by: NURSE ANESTHETIST, CERTIFIED REGISTERED

## 2020-07-26 PROCEDURE — 25010000002 MIDAZOLAM PER 1 MG: Performed by: NURSE ANESTHETIST, CERTIFIED REGISTERED

## 2020-07-26 PROCEDURE — 25010000002 CEFTRIAXONE PER 250 MG: Performed by: SURGERY

## 2020-07-26 PROCEDURE — 25010000002 FENTANYL CITRATE (PF) 100 MCG/2ML SOLUTION: Performed by: ANESTHESIOLOGY

## 2020-07-26 PROCEDURE — 0HBKXZZ EXCISION OF RIGHT LOWER LEG SKIN, EXTERNAL APPROACH: ICD-10-PCS | Performed by: SURGERY

## 2020-07-26 PROCEDURE — 99223 1ST HOSP IP/OBS HIGH 75: CPT | Performed by: INTERNAL MEDICINE

## 2020-07-26 PROCEDURE — 25010000002 MIDAZOLAM PER 1 MG: Performed by: ANESTHESIOLOGY

## 2020-07-26 PROCEDURE — 25010000002 PROPOFOL 10 MG/ML EMULSION: Performed by: NURSE ANESTHETIST, CERTIFIED REGISTERED

## 2020-07-26 PROCEDURE — 84132 ASSAY OF SERUM POTASSIUM: CPT | Performed by: INTERNAL MEDICINE

## 2020-07-26 PROCEDURE — 25010000002 FENTANYL CITRATE (PF) 100 MCG/2ML SOLUTION: Performed by: NURSE ANESTHETIST, CERTIFIED REGISTERED

## 2020-07-26 PROCEDURE — 25010000002 VANCOMYCIN 1 G RECONSTITUTED SOLUTION: Performed by: SURGERY

## 2020-07-26 PROCEDURE — 25010000002 HYDROMORPHONE PER 4 MG: Performed by: NURSE ANESTHETIST, CERTIFIED REGISTERED

## 2020-07-26 PROCEDURE — 0HBDXZZ EXCISION OF RIGHT LOWER ARM SKIN, EXTERNAL APPROACH: ICD-10-PCS | Performed by: SURGERY

## 2020-07-26 PROCEDURE — 25010000002 ONDANSETRON PER 1 MG: Performed by: NURSE ANESTHETIST, CERTIFIED REGISTERED

## 2020-07-26 RX ORDER — LIDOCAINE HYDROCHLORIDE 10 MG/ML
0.5 INJECTION, SOLUTION EPIDURAL; INFILTRATION; INTRACAUDAL; PERINEURAL ONCE AS NEEDED
Status: DISCONTINUED | OUTPATIENT
Start: 2020-07-26 | End: 2020-07-26 | Stop reason: HOSPADM

## 2020-07-26 RX ORDER — DIPHENHYDRAMINE HCL 25 MG
25 CAPSULE ORAL
Status: DISCONTINUED | OUTPATIENT
Start: 2020-07-26 | End: 2020-07-26 | Stop reason: HOSPADM

## 2020-07-26 RX ORDER — OXYCODONE AND ACETAMINOPHEN 7.5; 325 MG/1; MG/1
1 TABLET ORAL EVERY 8 HOURS PRN
Status: DISCONTINUED | OUTPATIENT
Start: 2020-07-26 | End: 2020-07-28

## 2020-07-26 RX ORDER — NALOXONE HCL 0.4 MG/ML
0.2 VIAL (ML) INJECTION AS NEEDED
Status: DISCONTINUED | OUTPATIENT
Start: 2020-07-26 | End: 2020-07-26 | Stop reason: HOSPADM

## 2020-07-26 RX ORDER — MIDAZOLAM HYDROCHLORIDE 1 MG/ML
INJECTION INTRAMUSCULAR; INTRAVENOUS AS NEEDED
Status: DISCONTINUED | OUTPATIENT
Start: 2020-07-26 | End: 2020-07-26 | Stop reason: SURG

## 2020-07-26 RX ORDER — PROMETHAZINE HYDROCHLORIDE 25 MG/1
25 TABLET ORAL ONCE AS NEEDED
Status: DISCONTINUED | OUTPATIENT
Start: 2020-07-26 | End: 2020-07-26 | Stop reason: HOSPADM

## 2020-07-26 RX ORDER — DIPHENHYDRAMINE HYDROCHLORIDE 50 MG/ML
12.5 INJECTION INTRAMUSCULAR; INTRAVENOUS
Status: DISCONTINUED | OUTPATIENT
Start: 2020-07-26 | End: 2020-07-26 | Stop reason: HOSPADM

## 2020-07-26 RX ORDER — MIDAZOLAM HYDROCHLORIDE 1 MG/ML
1 INJECTION INTRAMUSCULAR; INTRAVENOUS
Status: DISCONTINUED | OUTPATIENT
Start: 2020-07-26 | End: 2020-07-26 | Stop reason: HOSPADM

## 2020-07-26 RX ORDER — KETAMINE HYDROCHLORIDE 10 MG/ML
INJECTION INTRAMUSCULAR; INTRAVENOUS AS NEEDED
Status: DISCONTINUED | OUTPATIENT
Start: 2020-07-26 | End: 2020-07-26 | Stop reason: SURG

## 2020-07-26 RX ORDER — FAMOTIDINE 10 MG/ML
20 INJECTION, SOLUTION INTRAVENOUS ONCE
Status: COMPLETED | OUTPATIENT
Start: 2020-07-26 | End: 2020-07-26

## 2020-07-26 RX ORDER — FENTANYL CITRATE 50 UG/ML
50 INJECTION, SOLUTION INTRAMUSCULAR; INTRAVENOUS
Status: DISCONTINUED | OUTPATIENT
Start: 2020-07-26 | End: 2020-07-26 | Stop reason: HOSPADM

## 2020-07-26 RX ORDER — SODIUM CHLORIDE 0.9 % (FLUSH) 0.9 %
3 SYRINGE (ML) INJECTION EVERY 12 HOURS SCHEDULED
Status: DISCONTINUED | OUTPATIENT
Start: 2020-07-26 | End: 2020-07-26 | Stop reason: HOSPADM

## 2020-07-26 RX ORDER — PROPOFOL 10 MG/ML
VIAL (ML) INTRAVENOUS AS NEEDED
Status: DISCONTINUED | OUTPATIENT
Start: 2020-07-26 | End: 2020-07-26 | Stop reason: SURG

## 2020-07-26 RX ORDER — DEXAMETHASONE SODIUM PHOSPHATE 10 MG/ML
INJECTION INTRAMUSCULAR; INTRAVENOUS AS NEEDED
Status: DISCONTINUED | OUTPATIENT
Start: 2020-07-26 | End: 2020-07-26 | Stop reason: SURG

## 2020-07-26 RX ORDER — ONDANSETRON 2 MG/ML
4 INJECTION INTRAMUSCULAR; INTRAVENOUS ONCE AS NEEDED
Status: DISCONTINUED | OUTPATIENT
Start: 2020-07-26 | End: 2020-07-26 | Stop reason: HOSPADM

## 2020-07-26 RX ORDER — SODIUM CHLORIDE 0.9 % (FLUSH) 0.9 %
3-10 SYRINGE (ML) INJECTION AS NEEDED
Status: DISCONTINUED | OUTPATIENT
Start: 2020-07-26 | End: 2020-07-26 | Stop reason: HOSPADM

## 2020-07-26 RX ORDER — FENTANYL CITRATE 50 UG/ML
INJECTION, SOLUTION INTRAMUSCULAR; INTRAVENOUS AS NEEDED
Status: DISCONTINUED | OUTPATIENT
Start: 2020-07-26 | End: 2020-07-26 | Stop reason: SURG

## 2020-07-26 RX ORDER — HYDROCODONE BITARTRATE AND ACETAMINOPHEN 7.5; 325 MG/1; MG/1
1 TABLET ORAL ONCE AS NEEDED
Status: DISCONTINUED | OUTPATIENT
Start: 2020-07-26 | End: 2020-07-26 | Stop reason: HOSPADM

## 2020-07-26 RX ORDER — SODIUM CHLORIDE, SODIUM LACTATE, POTASSIUM CHLORIDE, CALCIUM CHLORIDE 600; 310; 30; 20 MG/100ML; MG/100ML; MG/100ML; MG/100ML
9 INJECTION, SOLUTION INTRAVENOUS CONTINUOUS
Status: DISCONTINUED | OUTPATIENT
Start: 2020-07-26 | End: 2020-08-01

## 2020-07-26 RX ORDER — ONDANSETRON 2 MG/ML
INJECTION INTRAMUSCULAR; INTRAVENOUS AS NEEDED
Status: DISCONTINUED | OUTPATIENT
Start: 2020-07-26 | End: 2020-07-26 | Stop reason: SURG

## 2020-07-26 RX ORDER — HYDROMORPHONE HYDROCHLORIDE 1 MG/ML
0.5 INJECTION, SOLUTION INTRAMUSCULAR; INTRAVENOUS; SUBCUTANEOUS
Status: DISCONTINUED | OUTPATIENT
Start: 2020-07-26 | End: 2020-07-26 | Stop reason: HOSPADM

## 2020-07-26 RX ORDER — OXYCODONE AND ACETAMINOPHEN 7.5; 325 MG/1; MG/1
1 TABLET ORAL ONCE AS NEEDED
Status: COMPLETED | OUTPATIENT
Start: 2020-07-26 | End: 2020-07-26

## 2020-07-26 RX ORDER — AMITRIPTYLINE HYDROCHLORIDE 50 MG/1
300 TABLET, FILM COATED ORAL NIGHTLY PRN
Status: DISCONTINUED | OUTPATIENT
Start: 2020-07-26 | End: 2020-08-06 | Stop reason: HOSPADM

## 2020-07-26 RX ORDER — HYDRALAZINE HYDROCHLORIDE 20 MG/ML
5 INJECTION INTRAMUSCULAR; INTRAVENOUS
Status: DISCONTINUED | OUTPATIENT
Start: 2020-07-26 | End: 2020-07-26 | Stop reason: HOSPADM

## 2020-07-26 RX ORDER — ACETAMINOPHEN 325 MG/1
650 TABLET ORAL ONCE AS NEEDED
Status: DISCONTINUED | OUTPATIENT
Start: 2020-07-26 | End: 2020-07-26 | Stop reason: HOSPADM

## 2020-07-26 RX ORDER — PROMETHAZINE HYDROCHLORIDE 25 MG/ML
6.25 INJECTION, SOLUTION INTRAMUSCULAR; INTRAVENOUS
Status: DISCONTINUED | OUTPATIENT
Start: 2020-07-26 | End: 2020-07-26 | Stop reason: HOSPADM

## 2020-07-26 RX ORDER — LEVETIRACETAM 250 MG/1
1000 TABLET ORAL ONCE
Status: COMPLETED | OUTPATIENT
Start: 2020-07-26 | End: 2020-07-26

## 2020-07-26 RX ORDER — PROMETHAZINE HYDROCHLORIDE 25 MG/ML
12.5 INJECTION, SOLUTION INTRAMUSCULAR; INTRAVENOUS ONCE AS NEEDED
Status: DISCONTINUED | OUTPATIENT
Start: 2020-07-26 | End: 2020-07-26 | Stop reason: HOSPADM

## 2020-07-26 RX ORDER — TRAMADOL HYDROCHLORIDE 50 MG/1
50 TABLET ORAL EVERY 6 HOURS PRN
Status: DISCONTINUED | OUTPATIENT
Start: 2020-07-26 | End: 2020-07-26

## 2020-07-26 RX ORDER — MAGNESIUM HYDROXIDE 1200 MG/15ML
LIQUID ORAL AS NEEDED
Status: DISCONTINUED | OUTPATIENT
Start: 2020-07-26 | End: 2020-07-26 | Stop reason: HOSPADM

## 2020-07-26 RX ORDER — VANCOMYCIN HYDROCHLORIDE 1 G/20ML
1 INJECTION, POWDER, LYOPHILIZED, FOR SOLUTION INTRAVENOUS ONCE
Status: COMPLETED | OUTPATIENT
Start: 2020-07-26 | End: 2020-07-26

## 2020-07-26 RX ORDER — PROMETHAZINE HYDROCHLORIDE 25 MG/1
25 SUPPOSITORY RECTAL ONCE AS NEEDED
Status: DISCONTINUED | OUTPATIENT
Start: 2020-07-26 | End: 2020-07-26 | Stop reason: HOSPADM

## 2020-07-26 RX ORDER — BUPIVACAINE HYDROCHLORIDE AND EPINEPHRINE 2.5; 5 MG/ML; UG/ML
INJECTION, SOLUTION EPIDURAL; INFILTRATION; INTRACAUDAL; PERINEURAL AS NEEDED
Status: DISCONTINUED | OUTPATIENT
Start: 2020-07-26 | End: 2020-07-26 | Stop reason: HOSPADM

## 2020-07-26 RX ORDER — LABETALOL HYDROCHLORIDE 5 MG/ML
5 INJECTION, SOLUTION INTRAVENOUS
Status: DISCONTINUED | OUTPATIENT
Start: 2020-07-26 | End: 2020-07-26 | Stop reason: HOSPADM

## 2020-07-26 RX ADMIN — LEVETIRACETAM 1000 MG: 500 TABLET, FILM COATED ORAL at 20:12

## 2020-07-26 RX ADMIN — LEVETIRACETAM 1000 MG: 500 TABLET, FILM COATED ORAL at 16:50

## 2020-07-26 RX ADMIN — VANCOMYCIN HYDROCHLORIDE 1250 MG: 10 INJECTION, POWDER, LYOPHILIZED, FOR SOLUTION INTRAVENOUS at 20:11

## 2020-07-26 RX ADMIN — AMITRIPTYLINE HYDROCHLORIDE 300 MG: 50 TABLET, FILM COATED ORAL at 23:54

## 2020-07-26 RX ADMIN — FAMOTIDINE 20 MG: 10 INJECTION INTRAVENOUS at 10:50

## 2020-07-26 RX ADMIN — KETAMINE HYDROCHLORIDE 20 MG: 10 INJECTION INTRAMUSCULAR; INTRAVENOUS at 11:55

## 2020-07-26 RX ADMIN — OXYCODONE HYDROCHLORIDE AND ACETAMINOPHEN 1 TABLET: 5; 325 TABLET ORAL at 08:32

## 2020-07-26 RX ADMIN — MIDAZOLAM 2 MG: 1 INJECTION INTRAMUSCULAR; INTRAVENOUS at 11:45

## 2020-07-26 RX ADMIN — KETAMINE HYDROCHLORIDE 10 MG: 10 INJECTION INTRAMUSCULAR; INTRAVENOUS at 12:55

## 2020-07-26 RX ADMIN — OXYCODONE HYDROCHLORIDE AND ACETAMINOPHEN 1 TABLET: 7.5; 325 TABLET ORAL at 13:38

## 2020-07-26 RX ADMIN — SODIUM CHLORIDE, POTASSIUM CHLORIDE, SODIUM LACTATE AND CALCIUM CHLORIDE 9 ML/HR: 600; 310; 30; 20 INJECTION, SOLUTION INTRAVENOUS at 10:51

## 2020-07-26 RX ADMIN — OXYCODONE HYDROCHLORIDE AND ACETAMINOPHEN 1 TABLET: 7.5; 325 TABLET ORAL at 23:40

## 2020-07-26 RX ADMIN — HYDROMORPHONE HYDROCHLORIDE 0.5 MG: 1 INJECTION, SOLUTION INTRAMUSCULAR; INTRAVENOUS; SUBCUTANEOUS at 13:45

## 2020-07-26 RX ADMIN — DULOXETINE HYDROCHLORIDE 30 MG: 30 CAPSULE, DELAYED RELEASE ORAL at 16:50

## 2020-07-26 RX ADMIN — SODIUM CHLORIDE, POTASSIUM CHLORIDE, SODIUM LACTATE AND CALCIUM CHLORIDE: 600; 310; 30; 20 INJECTION, SOLUTION INTRAVENOUS at 11:39

## 2020-07-26 RX ADMIN — CEFTRIAXONE SODIUM 1 G: 1 INJECTION, SOLUTION INTRAVENOUS at 16:48

## 2020-07-26 RX ADMIN — SODIUM CHLORIDE, PRESERVATIVE FREE 10 ML: 5 INJECTION INTRAVENOUS at 08:34

## 2020-07-26 RX ADMIN — NICOTINE 1 PATCH: 14 PATCH TRANSDERMAL at 08:33

## 2020-07-26 RX ADMIN — SODIUM CHLORIDE, POTASSIUM CHLORIDE, SODIUM LACTATE AND CALCIUM CHLORIDE 9 ML/HR: 600; 310; 30; 20 INJECTION, SOLUTION INTRAVENOUS at 13:28

## 2020-07-26 RX ADMIN — LEVETIRACETAM 1000 MG: 500 TABLET, FILM COATED ORAL at 10:50

## 2020-07-26 RX ADMIN — DEXAMETHASONE SODIUM PHOSPHATE 8 MG: 10 INJECTION INTRAMUSCULAR; INTRAVENOUS at 11:52

## 2020-07-26 RX ADMIN — ONDANSETRON HYDROCHLORIDE 4 MG: 2 SOLUTION INTRAMUSCULAR; INTRAVENOUS at 12:52

## 2020-07-26 RX ADMIN — PROPOFOL 200 MG: 10 INJECTION, EMULSION INTRAVENOUS at 11:48

## 2020-07-26 RX ADMIN — MIDAZOLAM 1 MG: 1 INJECTION INTRAMUSCULAR; INTRAVENOUS at 10:50

## 2020-07-26 RX ADMIN — KETAMINE HYDROCHLORIDE 20 MG: 10 INJECTION INTRAMUSCULAR; INTRAVENOUS at 12:22

## 2020-07-26 RX ADMIN — FENTANYL CITRATE 100 MCG: 50 INJECTION INTRAMUSCULAR; INTRAVENOUS at 11:47

## 2020-07-26 RX ADMIN — FENTANYL CITRATE 50 MCG: 50 INJECTION, SOLUTION INTRAMUSCULAR; INTRAVENOUS at 10:51

## 2020-07-26 RX ADMIN — SODIUM CHLORIDE, PRESERVATIVE FREE 10 ML: 5 INJECTION INTRAVENOUS at 20:13

## 2020-07-26 RX ADMIN — TRAMADOL HYDROCHLORIDE 50 MG: 50 TABLET, FILM COATED ORAL at 18:47

## 2020-07-26 RX ADMIN — VANCOMYCIN HYDROCHLORIDE 1 G: 1 INJECTION, POWDER, LYOPHILIZED, FOR SOLUTION INTRAVENOUS at 10:52
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
## 2020-07-26 NOTE — ANESTHESIA PROCEDURE NOTES
Airway  Urgency: elective    Date/Time: 7/26/2020 11:49 AM  Airway not difficult    General Information and Staff    Patient location during procedure: OR  Anesthesiologist: Hosea Leavitt MD  CRNA: Isabel Lobo CRNA    Indications and Patient Condition  Indications for airway management: airway protection    Preoxygenated: yes  MILS not maintained throughout  Mask difficulty assessment: 0 - not attempted    Final Airway Details  Final airway type: supraglottic airway      Successful airway: unique  Size 4    Number of attempts at approach: 1  Assessment: lips, teeth, and gum same as pre-op and atraumatic intubation    Additional Comments  LMA inserted with ease, assist to SV

## 2020-07-26 NOTE — ANESTHESIA PREPROCEDURE EVALUATION
Anesthesia Evaluation     Patient summary reviewed and Nursing notes reviewed   NPO Solid Status: > 8 hours  NPO Liquid Status: > 2 hours           Airway   Mallampati: II  TM distance: >3 FB  Neck ROM: full  Dental - normal exam     Pulmonary - normal exam   (+) a smoker Current Abstained day of surgery,   Cardiovascular - negative cardio ROS and normal exam    ECG reviewed        Neuro/Psych  (+) seizures, psychiatric history,     GI/Hepatic/Renal/Endo - negative ROS     Musculoskeletal     (+) back pain, chronic pain,   Abdominal    Substance History   (+) drug use     OB/GYN negative ob/gyn ROS         Other                        Anesthesia Plan    ASA 3     general       Anesthetic plan, all risks, benefits, and alternatives have been provided, discussed and informed consent has been obtained with: patient.

## 2020-07-27 PROBLEM — A49.02 MRSA INFECTION: Status: ACTIVE | Noted: 2020-07-27

## 2020-07-27 PROBLEM — D50.9 IDA (IRON DEFICIENCY ANEMIA): Status: ACTIVE | Noted: 2020-07-27

## 2020-07-27 PROBLEM — F41.9 ANXIETY DISORDER: Status: ACTIVE | Noted: 2020-07-27

## 2020-07-27 LAB
BASOPHILS # BLD AUTO: 0.02 10*3/MM3 (ref 0–0.2)
BASOPHILS NFR BLD AUTO: 0.2 % (ref 0–1.5)
DEPRECATED RDW RBC AUTO: 40 FL (ref 37–54)
EOSINOPHIL # BLD AUTO: 0.04 10*3/MM3 (ref 0–0.4)
EOSINOPHIL NFR BLD AUTO: 0.4 % (ref 0.3–6.2)
ERYTHROCYTE [DISTWIDTH] IN BLOOD BY AUTOMATED COUNT: 12.4 % (ref 12.3–15.4)
HCT VFR BLD AUTO: 36.8 % (ref 34–46.6)
HCV AB SER DONR QL: NORMAL
HGB BLD-MCNC: 11.9 G/DL (ref 12–15.9)
HIV1+2 AB SER QL: NORMAL
IMM GRANULOCYTES # BLD AUTO: 0.09 10*3/MM3 (ref 0–0.05)
IMM GRANULOCYTES NFR BLD AUTO: 0.8 % (ref 0–0.5)
LYMPHOCYTES # BLD AUTO: 1.45 10*3/MM3 (ref 0.7–3.1)
LYMPHOCYTES NFR BLD AUTO: 13.3 % (ref 19.6–45.3)
MCH RBC QN AUTO: 28.5 PG (ref 26.6–33)
MCHC RBC AUTO-ENTMCNC: 32.3 G/DL (ref 31.5–35.7)
MCV RBC AUTO: 88 FL (ref 79–97)
MONOCYTES # BLD AUTO: 0.74 10*3/MM3 (ref 0.1–0.9)
MONOCYTES NFR BLD AUTO: 6.8 % (ref 5–12)
NEUTROPHILS NFR BLD AUTO: 78.5 % (ref 42.7–76)
NEUTROPHILS NFR BLD AUTO: 8.56 10*3/MM3 (ref 1.7–7)
NRBC BLD AUTO-RTO: 0 /100 WBC (ref 0–0.2)
PLATELET # BLD AUTO: 446 10*3/MM3 (ref 140–450)
PMV BLD AUTO: 8.3 FL (ref 6–12)
RBC # BLD AUTO: 4.18 10*6/MM3 (ref 3.77–5.28)
WBC # BLD AUTO: 10.9 10*3/MM3 (ref 3.4–10.8)

## 2020-07-27 PROCEDURE — 86803 HEPATITIS C AB TEST: CPT | Performed by: SURGERY

## 2020-07-27 PROCEDURE — 25010000002 VANCOMYCIN 10 G RECONSTITUTED SOLUTION: Performed by: INTERNAL MEDICINE

## 2020-07-27 PROCEDURE — 99232 SBSQ HOSP IP/OBS MODERATE 35: CPT | Performed by: INTERNAL MEDICINE

## 2020-07-27 PROCEDURE — 90791 PSYCH DIAGNOSTIC EVALUATION: CPT

## 2020-07-27 PROCEDURE — 85025 COMPLETE CBC W/AUTO DIFF WBC: CPT | Performed by: SURGERY

## 2020-07-27 PROCEDURE — 25010000002 CEFTRIAXONE PER 250 MG: Performed by: SURGERY

## 2020-07-27 PROCEDURE — G0432 EIA HIV-1/HIV-2 SCREEN: HCPCS | Performed by: SURGERY

## 2020-07-27 RX ORDER — OXYCODONE AND ACETAMINOPHEN 7.5; 325 MG/1; MG/1
1 TABLET ORAL ONCE AS NEEDED
Status: COMPLETED | OUTPATIENT
Start: 2020-07-27 | End: 2020-07-27

## 2020-07-27 RX ORDER — FERROUS SULFATE 325(65) MG
325 TABLET ORAL EVERY OTHER DAY
Status: DISCONTINUED | OUTPATIENT
Start: 2020-07-27 | End: 2020-08-06 | Stop reason: HOSPADM

## 2020-07-27 RX ORDER — SULFAMETHOXAZOLE AND TRIMETHOPRIM 800; 160 MG/1; MG/1
1 TABLET ORAL EVERY 12 HOURS SCHEDULED
Status: COMPLETED | OUTPATIENT
Start: 2020-07-27 | End: 2020-08-02

## 2020-07-27 RX ORDER — ALPRAZOLAM 1 MG/1
1 TABLET ORAL 2 TIMES DAILY PRN
Status: DISCONTINUED | OUTPATIENT
Start: 2020-07-27 | End: 2020-07-30

## 2020-07-27 RX ADMIN — SULFAMETHOXAZOLE AND TRIMETHOPRIM 160 MG: 800; 160 TABLET ORAL at 21:54

## 2020-07-27 RX ADMIN — CEFTRIAXONE SODIUM 1 G: 1 INJECTION, SOLUTION INTRAVENOUS at 15:08

## 2020-07-27 RX ADMIN — SULFAMETHOXAZOLE AND TRIMETHOPRIM 160 MG: 800; 160 TABLET ORAL at 13:14

## 2020-07-27 RX ADMIN — VANCOMYCIN HYDROCHLORIDE 1250 MG: 10 INJECTION, POWDER, LYOPHILIZED, FOR SOLUTION INTRAVENOUS at 06:12

## 2020-07-27 RX ADMIN — ALPRAZOLAM 1 MG: 0.5 TABLET ORAL at 21:52

## 2020-07-27 RX ADMIN — OXYCODONE HYDROCHLORIDE AND ACETAMINOPHEN 1 TABLET: 7.5; 325 TABLET ORAL at 08:01

## 2020-07-27 RX ADMIN — LEVETIRACETAM 1000 MG: 500 TABLET, FILM COATED ORAL at 08:01

## 2020-07-27 RX ADMIN — FERROUS SULFATE TAB 325 MG (65 MG ELEMENTAL FE) 325 MG: 325 (65 FE) TAB at 15:08

## 2020-07-27 RX ADMIN — NICOTINE 1 PATCH: 14 PATCH TRANSDERMAL at 08:04

## 2020-07-27 RX ADMIN — ALPRAZOLAM 1 MG: 0.5 TABLET ORAL at 13:19

## 2020-07-27 RX ADMIN — SILVER SULFADIAZINE: 10 CREAM TOPICAL at 21:56

## 2020-07-27 RX ADMIN — LEVETIRACETAM 1000 MG: 500 TABLET, FILM COATED ORAL at 21:54

## 2020-07-27 RX ADMIN — OXYCODONE HYDROCHLORIDE AND ACETAMINOPHEN 1 TABLET: 7.5; 325 TABLET ORAL at 03:58

## 2020-07-27 RX ADMIN — OXYCODONE HYDROCHLORIDE AND ACETAMINOPHEN 1 TABLET: 7.5; 325 TABLET ORAL at 16:10

## 2020-07-27 RX ADMIN — SODIUM CHLORIDE, PRESERVATIVE FREE 10 ML: 5 INJECTION INTRAVENOUS at 21:57

## 2020-07-27 RX ADMIN — DULOXETINE HYDROCHLORIDE 30 MG: 30 CAPSULE, DELAYED RELEASE ORAL at 08:01

## 2020-07-27 RX ADMIN — SILVER SULFADIAZINE: 10 CREAM TOPICAL at 11:30

## 2020-07-27 RX ADMIN — SODIUM CHLORIDE, POTASSIUM CHLORIDE, SODIUM LACTATE AND CALCIUM CHLORIDE 9 ML/HR: 600; 310; 30; 20 INJECTION, SOLUTION INTRAVENOUS at 06:12

## 2020-07-27 RX ADMIN — SILVER SULFADIAZINE: 10 CREAM TOPICAL at 21:57

## 2020-07-28 PROBLEM — E87.6 HYPOKALEMIA: Status: ACTIVE | Noted: 2020-07-28

## 2020-07-28 LAB
DEPRECATED RDW RBC AUTO: 39.7 FL (ref 37–54)
ERYTHROCYTE [DISTWIDTH] IN BLOOD BY AUTOMATED COUNT: 12.4 % (ref 12.3–15.4)
HCT VFR BLD AUTO: 37.7 % (ref 34–46.6)
HGB BLD-MCNC: 12.6 G/DL (ref 12–15.9)
MCH RBC QN AUTO: 28.8 PG (ref 26.6–33)
MCHC RBC AUTO-ENTMCNC: 33.4 G/DL (ref 31.5–35.7)
MCV RBC AUTO: 86.3 FL (ref 79–97)
PLATELET # BLD AUTO: 495 10*3/MM3 (ref 140–450)
PMV BLD AUTO: 8.3 FL (ref 6–12)
RBC # BLD AUTO: 4.37 10*6/MM3 (ref 3.77–5.28)
WBC # BLD AUTO: 8.79 10*3/MM3 (ref 3.4–10.8)

## 2020-07-28 PROCEDURE — 85027 COMPLETE CBC AUTOMATED: CPT | Performed by: NURSE PRACTITIONER

## 2020-07-28 PROCEDURE — 90791 PSYCH DIAGNOSTIC EVALUATION: CPT | Performed by: SOCIAL WORKER

## 2020-07-28 PROCEDURE — 25010000002 CEFTRIAXONE PER 250 MG: Performed by: SURGERY

## 2020-07-28 RX ORDER — CLONIDINE HYDROCHLORIDE 0.1 MG/1
0.1 TABLET ORAL 4 TIMES DAILY PRN
Status: DISPENSED | OUTPATIENT
Start: 2020-07-29 | End: 2020-07-30

## 2020-07-28 RX ORDER — CLONIDINE HYDROCHLORIDE 0.1 MG/1
0.1 TABLET ORAL ONCE AS NEEDED
Status: ACTIVE | OUTPATIENT
Start: 2020-08-01 | End: 2020-08-02

## 2020-07-28 RX ORDER — CLONIDINE HYDROCHLORIDE 0.1 MG/1
0.1 TABLET ORAL 2 TIMES DAILY PRN
Status: ACTIVE | OUTPATIENT
Start: 2020-07-31 | End: 2020-08-01

## 2020-07-28 RX ORDER — CLONIDINE HYDROCHLORIDE 0.1 MG/1
0.1 TABLET ORAL 3 TIMES DAILY PRN
Status: ACTIVE | OUTPATIENT
Start: 2020-07-30 | End: 2020-07-31

## 2020-07-28 RX ORDER — CLONIDINE HYDROCHLORIDE 0.1 MG/1
0.1 TABLET ORAL 4 TIMES DAILY PRN
Status: ACTIVE | OUTPATIENT
Start: 2020-07-28 | End: 2020-07-29

## 2020-07-28 RX ORDER — OXYCODONE HYDROCHLORIDE AND ACETAMINOPHEN 5; 325 MG/1; MG/1
2 TABLET ORAL EVERY 8 HOURS PRN
Status: DISCONTINUED | OUTPATIENT
Start: 2020-07-28 | End: 2020-08-06 | Stop reason: HOSPADM

## 2020-07-28 RX ADMIN — LEVETIRACETAM 1000 MG: 500 TABLET, FILM COATED ORAL at 20:28

## 2020-07-28 RX ADMIN — LEVETIRACETAM 1000 MG: 500 TABLET, FILM COATED ORAL at 08:58

## 2020-07-28 RX ADMIN — SILVER SULFADIAZINE: 10 CREAM TOPICAL at 11:16

## 2020-07-28 RX ADMIN — ALPRAZOLAM 1 MG: 0.5 TABLET ORAL at 08:58

## 2020-07-28 RX ADMIN — AMITRIPTYLINE HYDROCHLORIDE 300 MG: 50 TABLET, FILM COATED ORAL at 03:14

## 2020-07-28 RX ADMIN — SODIUM CHLORIDE, PRESERVATIVE FREE 10 ML: 5 INJECTION INTRAVENOUS at 20:28

## 2020-07-28 RX ADMIN — DULOXETINE HYDROCHLORIDE 30 MG: 30 CAPSULE, DELAYED RELEASE ORAL at 08:58

## 2020-07-28 RX ADMIN — SODIUM CHLORIDE, PRESERVATIVE FREE 10 ML: 5 INJECTION INTRAVENOUS at 08:57

## 2020-07-28 RX ADMIN — CEFTRIAXONE SODIUM 1 G: 1 INJECTION, SOLUTION INTRAVENOUS at 16:10

## 2020-07-28 RX ADMIN — OXYCODONE HYDROCHLORIDE AND ACETAMINOPHEN 1 TABLET: 7.5; 325 TABLET ORAL at 08:58

## 2020-07-28 RX ADMIN — NICOTINE 1 PATCH: 14 PATCH TRANSDERMAL at 08:57

## 2020-07-28 RX ADMIN — OXYCODONE HYDROCHLORIDE AND ACETAMINOPHEN 2 TABLET: 5; 325 TABLET ORAL at 17:01

## 2020-07-28 RX ADMIN — ACETAMINOPHEN 650 MG: 325 TABLET, FILM COATED ORAL at 21:18

## 2020-07-28 RX ADMIN — SILVER SULFADIAZINE: 10 CREAM TOPICAL at 21:18

## 2020-07-28 RX ADMIN — OXYCODONE HYDROCHLORIDE AND ACETAMINOPHEN 1 TABLET: 7.5; 325 TABLET ORAL at 00:01

## 2020-07-28 RX ADMIN — SULFAMETHOXAZOLE AND TRIMETHOPRIM 160 MG: 800; 160 TABLET ORAL at 08:58

## 2020-07-28 RX ADMIN — SULFAMETHOXAZOLE AND TRIMETHOPRIM 160 MG: 800; 160 TABLET ORAL at 20:28

## 2020-07-28 RX ADMIN — ALPRAZOLAM 1 MG: 0.5 TABLET ORAL at 20:28

## 2020-07-29 PROBLEM — G93.41 METABOLIC ENCEPHALOPATHY: Status: ACTIVE | Noted: 2020-07-29

## 2020-07-29 LAB
ANION GAP SERPL CALCULATED.3IONS-SCNC: 11.5 MMOL/L (ref 5–15)
BACTERIA SPEC AEROBE CULT: NORMAL
BACTERIA SPEC AEROBE CULT: NORMAL
BUN SERPL-MCNC: 8 MG/DL (ref 6–20)
BUN/CREAT SERPL: 13.6 (ref 7–25)
CALCIUM SPEC-SCNC: 9.5 MG/DL (ref 8.6–10.5)
CHLORIDE SERPL-SCNC: 103 MMOL/L (ref 98–107)
CO2 SERPL-SCNC: 22.5 MMOL/L (ref 22–29)
CREAT SERPL-MCNC: 0.59 MG/DL (ref 0.57–1)
GFR SERPL CREATININE-BSD FRML MDRD: 108 ML/MIN/1.73
GLUCOSE BLDC GLUCOMTR-MCNC: 136 MG/DL (ref 70–130)
GLUCOSE SERPL-MCNC: 119 MG/DL (ref 65–99)
POTASSIUM SERPL-SCNC: 3.6 MMOL/L (ref 3.5–5.2)
SODIUM SERPL-SCNC: 137 MMOL/L (ref 136–145)

## 2020-07-29 PROCEDURE — 80048 BASIC METABOLIC PNL TOTAL CA: CPT | Performed by: NURSE PRACTITIONER

## 2020-07-29 PROCEDURE — 93005 ELECTROCARDIOGRAM TRACING: CPT | Performed by: INTERNAL MEDICINE

## 2020-07-29 PROCEDURE — 25010000002 MORPHINE PER 10 MG: Performed by: INTERNAL MEDICINE

## 2020-07-29 PROCEDURE — 25010000002 LORAZEPAM PER 2 MG: Performed by: HOSPITALIST

## 2020-07-29 PROCEDURE — 82962 GLUCOSE BLOOD TEST: CPT

## 2020-07-29 PROCEDURE — 93010 ELECTROCARDIOGRAM REPORT: CPT | Performed by: INTERNAL MEDICINE

## 2020-07-29 RX ORDER — MORPHINE SULFATE 2 MG/ML
2 INJECTION, SOLUTION INTRAMUSCULAR; INTRAVENOUS EVERY 4 HOURS PRN
Status: DISPENSED | OUTPATIENT
Start: 2020-07-29 | End: 2020-08-05

## 2020-07-29 RX ORDER — HYDROXYZINE HYDROCHLORIDE 25 MG/1
25 TABLET, FILM COATED ORAL 4 TIMES DAILY PRN
Status: DISCONTINUED | OUTPATIENT
Start: 2020-07-29 | End: 2020-07-30 | Stop reason: SDUPTHER

## 2020-07-29 RX ORDER — LORAZEPAM 2 MG/ML
1 INJECTION INTRAMUSCULAR ONCE
Status: COMPLETED | OUTPATIENT
Start: 2020-07-29 | End: 2020-07-29

## 2020-07-29 RX ADMIN — SILVER SULFADIAZINE: 10 CREAM TOPICAL at 11:43

## 2020-07-29 RX ADMIN — LEVETIRACETAM 1000 MG: 500 TABLET, FILM COATED ORAL at 20:58

## 2020-07-29 RX ADMIN — SULFAMETHOXAZOLE AND TRIMETHOPRIM 160 MG: 800; 160 TABLET ORAL at 11:42

## 2020-07-29 RX ADMIN — FERROUS SULFATE TAB 325 MG (65 MG ELEMENTAL FE) 325 MG: 325 (65 FE) TAB at 11:42

## 2020-07-29 RX ADMIN — DULOXETINE HYDROCHLORIDE 30 MG: 30 CAPSULE, DELAYED RELEASE ORAL at 11:42

## 2020-07-29 RX ADMIN — CLONIDINE HYDROCHLORIDE 0.1 MG: 0.1 TABLET ORAL at 20:59

## 2020-07-29 RX ADMIN — MORPHINE SULFATE 2 MG: 2 INJECTION, SOLUTION INTRAMUSCULAR; INTRAVENOUS at 08:45

## 2020-07-29 RX ADMIN — AMITRIPTYLINE HYDROCHLORIDE 300 MG: 50 TABLET, FILM COATED ORAL at 01:58

## 2020-07-29 RX ADMIN — SODIUM CHLORIDE, PRESERVATIVE FREE 10 ML: 5 INJECTION INTRAVENOUS at 21:02

## 2020-07-29 RX ADMIN — SILVER SULFADIAZINE: 10 CREAM TOPICAL at 21:03

## 2020-07-29 RX ADMIN — CLONIDINE HYDROCHLORIDE 0.1 MG: 0.1 TABLET ORAL at 15:19

## 2020-07-29 RX ADMIN — CLONIDINE HYDROCHLORIDE 0.1 MG: 0.1 TABLET ORAL at 09:25

## 2020-07-29 RX ADMIN — ALPRAZOLAM 1 MG: 0.5 TABLET ORAL at 12:51

## 2020-07-29 RX ADMIN — SODIUM CHLORIDE, PRESERVATIVE FREE 10 ML: 5 INJECTION INTRAVENOUS at 11:43

## 2020-07-29 RX ADMIN — LEVETIRACETAM 1000 MG: 500 TABLET, FILM COATED ORAL at 11:42

## 2020-07-29 RX ADMIN — NICOTINE 1 PATCH: 14 PATCH TRANSDERMAL at 11:43

## 2020-07-29 RX ADMIN — SULFAMETHOXAZOLE AND TRIMETHOPRIM 160 MG: 800; 160 TABLET ORAL at 20:58

## 2020-07-29 RX ADMIN — MORPHINE SULFATE 2 MG: 2 INJECTION, SOLUTION INTRAMUSCULAR; INTRAVENOUS at 18:10

## 2020-07-29 RX ADMIN — OXYCODONE HYDROCHLORIDE AND ACETAMINOPHEN 2 TABLET: 5; 325 TABLET ORAL at 01:26

## 2020-07-29 RX ADMIN — OXYCODONE HYDROCHLORIDE AND ACETAMINOPHEN 2 TABLET: 5; 325 TABLET ORAL at 13:22

## 2020-07-29 RX ADMIN — LORAZEPAM 1 MG: 2 INJECTION INTRAMUSCULAR; INTRAVENOUS at 06:27

## 2020-07-29 RX ADMIN — MORPHINE SULFATE 2 MG: 2 INJECTION, SOLUTION INTRAMUSCULAR; INTRAVENOUS at 12:51

## 2020-07-29 RX ADMIN — OXYCODONE HYDROCHLORIDE AND ACETAMINOPHEN 2 TABLET: 5; 325 TABLET ORAL at 20:58

## 2020-07-30 ENCOUNTER — ANESTHESIA EVENT (OUTPATIENT)
Dept: PERIOP | Facility: HOSPITAL | Age: 50
End: 2020-07-30

## 2020-07-30 ENCOUNTER — ANESTHESIA (OUTPATIENT)
Dept: PERIOP | Facility: HOSPITAL | Age: 50
End: 2020-07-30

## 2020-07-30 LAB
ANION GAP SERPL CALCULATED.3IONS-SCNC: 7.7 MMOL/L (ref 5–15)
BUN SERPL-MCNC: 9 MG/DL (ref 6–20)
BUN/CREAT SERPL: 15.5 (ref 7–25)
CALCIUM SPEC-SCNC: 9.3 MG/DL (ref 8.6–10.5)
CHLORIDE SERPL-SCNC: 98 MMOL/L (ref 98–107)
CO2 SERPL-SCNC: 26.3 MMOL/L (ref 22–29)
CREAT SERPL-MCNC: 0.58 MG/DL (ref 0.57–1)
GFR SERPL CREATININE-BSD FRML MDRD: 110 ML/MIN/1.73
GLUCOSE SERPL-MCNC: 99 MG/DL (ref 65–99)
POTASSIUM SERPL-SCNC: 4 MMOL/L (ref 3.5–5.2)
SODIUM SERPL-SCNC: 132 MMOL/L (ref 136–145)

## 2020-07-30 PROCEDURE — 25010000002 FENTANYL CITRATE (PF) 100 MCG/2ML SOLUTION: Performed by: NURSE ANESTHETIST, CERTIFIED REGISTERED

## 2020-07-30 PROCEDURE — 25010000002 NEOSTIGMINE PER 0.5 MG: Performed by: NURSE ANESTHETIST, CERTIFIED REGISTERED

## 2020-07-30 PROCEDURE — 0HRDX74 REPLACEMENT OF RIGHT LOWER ARM SKIN WITH AUTOLOGOUS TISSUE SUBSTITUTE, PARTIAL THICKNESS, EXTERNAL APPROACH: ICD-10-PCS | Performed by: SURGERY

## 2020-07-30 PROCEDURE — 0HRHX74 REPLACEMENT OF RIGHT UPPER LEG SKIN WITH AUTOLOGOUS TISSUE SUBSTITUTE, PARTIAL THICKNESS, EXTERNAL APPROACH: ICD-10-PCS | Performed by: SURGERY

## 2020-07-30 PROCEDURE — 25010000002 ONDANSETRON PER 1 MG: Performed by: NURSE ANESTHETIST, CERTIFIED REGISTERED

## 2020-07-30 PROCEDURE — 25010000002 PHENYLEPHRINE PER 1 ML: Performed by: NURSE ANESTHETIST, CERTIFIED REGISTERED

## 2020-07-30 PROCEDURE — 25010000002 MORPHINE PER 10 MG: Performed by: INTERNAL MEDICINE

## 2020-07-30 PROCEDURE — 0HBJXZZ EXCISION OF LEFT UPPER LEG SKIN, EXTERNAL APPROACH: ICD-10-PCS | Performed by: SURGERY

## 2020-07-30 PROCEDURE — 25010000002 MIDAZOLAM PER 1 MG: Performed by: ANESTHESIOLOGY

## 2020-07-30 PROCEDURE — 25010000002 MORPHINE PER 10 MG: Performed by: SURGERY

## 2020-07-30 PROCEDURE — 25010000002 HYDROMORPHONE PER 4 MG: Performed by: NURSE ANESTHETIST, CERTIFIED REGISTERED

## 2020-07-30 PROCEDURE — 25010000002 FENTANYL CITRATE (PF) 100 MCG/2ML SOLUTION: Performed by: ANESTHESIOLOGY

## 2020-07-30 PROCEDURE — 80048 BASIC METABOLIC PNL TOTAL CA: CPT | Performed by: NURSE PRACTITIONER

## 2020-07-30 PROCEDURE — 25010000002 PROPOFOL 10 MG/ML EMULSION: Performed by: NURSE ANESTHETIST, CERTIFIED REGISTERED

## 2020-07-30 RX ORDER — NALOXONE HCL 0.4 MG/ML
0.2 VIAL (ML) INJECTION AS NEEDED
Status: DISCONTINUED | OUTPATIENT
Start: 2020-07-30 | End: 2020-07-30 | Stop reason: HOSPADM

## 2020-07-30 RX ORDER — PROPOFOL 10 MG/ML
VIAL (ML) INTRAVENOUS AS NEEDED
Status: DISCONTINUED | OUTPATIENT
Start: 2020-07-30 | End: 2020-07-30 | Stop reason: SURG

## 2020-07-30 RX ORDER — FLUMAZENIL 0.1 MG/ML
0.2 INJECTION INTRAVENOUS AS NEEDED
Status: DISCONTINUED | OUTPATIENT
Start: 2020-07-30 | End: 2020-07-30 | Stop reason: HOSPADM

## 2020-07-30 RX ORDER — LABETALOL HYDROCHLORIDE 5 MG/ML
5 INJECTION, SOLUTION INTRAVENOUS
Status: DISCONTINUED | OUTPATIENT
Start: 2020-07-30 | End: 2020-07-30 | Stop reason: HOSPADM

## 2020-07-30 RX ORDER — MAGNESIUM HYDROXIDE 1200 MG/15ML
LIQUID ORAL AS NEEDED
Status: DISCONTINUED | OUTPATIENT
Start: 2020-07-30 | End: 2020-07-30 | Stop reason: HOSPADM

## 2020-07-30 RX ORDER — GLYCOPYRROLATE 0.2 MG/ML
INJECTION INTRAMUSCULAR; INTRAVENOUS AS NEEDED
Status: DISCONTINUED | OUTPATIENT
Start: 2020-07-30 | End: 2020-07-30 | Stop reason: SURG

## 2020-07-30 RX ORDER — LIDOCAINE HYDROCHLORIDE 20 MG/ML
INJECTION, SOLUTION INFILTRATION; PERINEURAL AS NEEDED
Status: DISCONTINUED | OUTPATIENT
Start: 2020-07-30 | End: 2020-07-30 | Stop reason: SURG

## 2020-07-30 RX ORDER — PROMETHAZINE HYDROCHLORIDE 25 MG/ML
6.25 INJECTION, SOLUTION INTRAMUSCULAR; INTRAVENOUS
Status: DISCONTINUED | OUTPATIENT
Start: 2020-07-30 | End: 2020-07-30 | Stop reason: HOSPADM

## 2020-07-30 RX ORDER — MIDAZOLAM HYDROCHLORIDE 1 MG/ML
1 INJECTION INTRAMUSCULAR; INTRAVENOUS
Status: DISCONTINUED | OUTPATIENT
Start: 2020-07-30 | End: 2020-07-30 | Stop reason: HOSPADM

## 2020-07-30 RX ORDER — HYDROCODONE BITARTRATE AND ACETAMINOPHEN 7.5; 325 MG/1; MG/1
1 TABLET ORAL ONCE AS NEEDED
Status: DISCONTINUED | OUTPATIENT
Start: 2020-07-30 | End: 2020-07-30 | Stop reason: HOSPADM

## 2020-07-30 RX ORDER — HYDROXYZINE PAMOATE 50 MG/1
50 CAPSULE ORAL EVERY 6 HOURS PRN
Status: DISCONTINUED | OUTPATIENT
Start: 2020-07-30 | End: 2020-08-06 | Stop reason: HOSPADM

## 2020-07-30 RX ORDER — OXYCODONE AND ACETAMINOPHEN 7.5; 325 MG/1; MG/1
1 TABLET ORAL ONCE AS NEEDED
Status: DISCONTINUED | OUTPATIENT
Start: 2020-07-30 | End: 2020-07-30 | Stop reason: HOSPADM

## 2020-07-30 RX ORDER — HYDROMORPHONE HYDROCHLORIDE 1 MG/ML
0.5 INJECTION, SOLUTION INTRAMUSCULAR; INTRAVENOUS; SUBCUTANEOUS
Status: DISCONTINUED | OUTPATIENT
Start: 2020-07-30 | End: 2020-07-30 | Stop reason: HOSPADM

## 2020-07-30 RX ORDER — MAGNESIUM CARB/ALUMINUM HYDROX 105-160MG
TABLET,CHEWABLE ORAL AS NEEDED
Status: DISCONTINUED | OUTPATIENT
Start: 2020-07-30 | End: 2020-07-30 | Stop reason: HOSPADM

## 2020-07-30 RX ORDER — FENTANYL CITRATE 50 UG/ML
50 INJECTION, SOLUTION INTRAMUSCULAR; INTRAVENOUS
Status: DISCONTINUED | OUTPATIENT
Start: 2020-07-30 | End: 2020-07-30 | Stop reason: HOSPADM

## 2020-07-30 RX ORDER — SODIUM CHLORIDE 0.9 % (FLUSH) 0.9 %
3 SYRINGE (ML) INJECTION EVERY 12 HOURS SCHEDULED
Status: DISCONTINUED | OUTPATIENT
Start: 2020-07-30 | End: 2020-07-30 | Stop reason: HOSPADM

## 2020-07-30 RX ORDER — ONDANSETRON 2 MG/ML
INJECTION INTRAMUSCULAR; INTRAVENOUS AS NEEDED
Status: DISCONTINUED | OUTPATIENT
Start: 2020-07-30 | End: 2020-07-30 | Stop reason: SURG

## 2020-07-30 RX ORDER — FAMOTIDINE 10 MG/ML
20 INJECTION, SOLUTION INTRAVENOUS ONCE
Status: COMPLETED | OUTPATIENT
Start: 2020-07-30 | End: 2020-07-30

## 2020-07-30 RX ORDER — PROMETHAZINE HYDROCHLORIDE 25 MG/ML
12.5 INJECTION, SOLUTION INTRAMUSCULAR; INTRAVENOUS ONCE AS NEEDED
Status: DISCONTINUED | OUTPATIENT
Start: 2020-07-30 | End: 2020-07-30 | Stop reason: HOSPADM

## 2020-07-30 RX ORDER — LIDOCAINE HYDROCHLORIDE 10 MG/ML
0.5 INJECTION, SOLUTION EPIDURAL; INFILTRATION; INTRACAUDAL; PERINEURAL ONCE AS NEEDED
Status: DISCONTINUED | OUTPATIENT
Start: 2020-07-30 | End: 2020-07-30 | Stop reason: HOSPADM

## 2020-07-30 RX ORDER — SODIUM CHLORIDE 0.9 % (FLUSH) 0.9 %
3-10 SYRINGE (ML) INJECTION AS NEEDED
Status: DISCONTINUED | OUTPATIENT
Start: 2020-07-30 | End: 2020-07-30 | Stop reason: HOSPADM

## 2020-07-30 RX ORDER — ROCURONIUM BROMIDE 10 MG/ML
INJECTION, SOLUTION INTRAVENOUS AS NEEDED
Status: DISCONTINUED | OUTPATIENT
Start: 2020-07-30 | End: 2020-07-30 | Stop reason: SURG

## 2020-07-30 RX ORDER — PROMETHAZINE HYDROCHLORIDE 25 MG/1
25 SUPPOSITORY RECTAL ONCE AS NEEDED
Status: DISCONTINUED | OUTPATIENT
Start: 2020-07-30 | End: 2020-07-30 | Stop reason: HOSPADM

## 2020-07-30 RX ORDER — PROMETHAZINE HYDROCHLORIDE 25 MG/1
25 TABLET ORAL ONCE AS NEEDED
Status: DISCONTINUED | OUTPATIENT
Start: 2020-07-30 | End: 2020-07-30 | Stop reason: HOSPADM

## 2020-07-30 RX ORDER — HYDRALAZINE HYDROCHLORIDE 20 MG/ML
5 INJECTION INTRAMUSCULAR; INTRAVENOUS
Status: DISCONTINUED | OUTPATIENT
Start: 2020-07-30 | End: 2020-07-30 | Stop reason: HOSPADM

## 2020-07-30 RX ORDER — FENTANYL CITRATE 50 UG/ML
INJECTION, SOLUTION INTRAMUSCULAR; INTRAVENOUS AS NEEDED
Status: DISCONTINUED | OUTPATIENT
Start: 2020-07-30 | End: 2020-07-30 | Stop reason: SURG

## 2020-07-30 RX ORDER — KETAMINE HYDROCHLORIDE 10 MG/ML
INJECTION INTRAMUSCULAR; INTRAVENOUS AS NEEDED
Status: DISCONTINUED | OUTPATIENT
Start: 2020-07-30 | End: 2020-07-30 | Stop reason: SURG

## 2020-07-30 RX ORDER — SODIUM CHLORIDE, SODIUM LACTATE, POTASSIUM CHLORIDE, CALCIUM CHLORIDE 600; 310; 30; 20 MG/100ML; MG/100ML; MG/100ML; MG/100ML
9 INJECTION, SOLUTION INTRAVENOUS CONTINUOUS
Status: DISCONTINUED | OUTPATIENT
Start: 2020-07-30 | End: 2020-08-01

## 2020-07-30 RX ORDER — LORAZEPAM 1 MG/1
2 TABLET ORAL EVERY 6 HOURS PRN
Status: DISCONTINUED | OUTPATIENT
Start: 2020-07-30 | End: 2020-08-06 | Stop reason: HOSPADM

## 2020-07-30 RX ORDER — ONDANSETRON 2 MG/ML
4 INJECTION INTRAMUSCULAR; INTRAVENOUS ONCE AS NEEDED
Status: DISCONTINUED | OUTPATIENT
Start: 2020-07-30 | End: 2020-07-30 | Stop reason: HOSPADM

## 2020-07-30 RX ORDER — ACETAMINOPHEN 325 MG/1
650 TABLET ORAL ONCE AS NEEDED
Status: DISCONTINUED | OUTPATIENT
Start: 2020-07-30 | End: 2020-07-30 | Stop reason: HOSPADM

## 2020-07-30 RX ORDER — DIPHENHYDRAMINE HYDROCHLORIDE 50 MG/ML
12.5 INJECTION INTRAMUSCULAR; INTRAVENOUS
Status: DISCONTINUED | OUTPATIENT
Start: 2020-07-30 | End: 2020-07-30 | Stop reason: HOSPADM

## 2020-07-30 RX ORDER — DIPHENHYDRAMINE HCL 25 MG
25 CAPSULE ORAL
Status: DISCONTINUED | OUTPATIENT
Start: 2020-07-30 | End: 2020-07-30 | Stop reason: HOSPADM

## 2020-07-30 RX ADMIN — KETAMINE HYDROCHLORIDE 50 MG: 10 INJECTION INTRAMUSCULAR; INTRAVENOUS at 14:48

## 2020-07-30 RX ADMIN — FENTANYL CITRATE 50 MCG: 50 INJECTION, SOLUTION INTRAMUSCULAR; INTRAVENOUS at 17:18

## 2020-07-30 RX ADMIN — GLYCOPYRROLATE 0.6 MG: 0.2 INJECTION INTRAMUSCULAR; INTRAVENOUS at 15:51

## 2020-07-30 RX ADMIN — SODIUM CHLORIDE, PRESERVATIVE FREE 10 ML: 5 INJECTION INTRAVENOUS at 22:15

## 2020-07-30 RX ADMIN — MIDAZOLAM 1 MG: 1 INJECTION INTRAMUSCULAR; INTRAVENOUS at 13:34

## 2020-07-30 RX ADMIN — SULFAMETHOXAZOLE AND TRIMETHOPRIM 160 MG: 800; 160 TABLET ORAL at 08:49

## 2020-07-30 RX ADMIN — PHENYLEPHRINE HYDROCHLORIDE 100 MCG: 10 INJECTION INTRAVENOUS at 14:29

## 2020-07-30 RX ADMIN — ALPRAZOLAM 1 MG: 0.5 TABLET ORAL at 03:08

## 2020-07-30 RX ADMIN — FENTANYL CITRATE 50 MCG: 50 INJECTION, SOLUTION INTRAMUSCULAR; INTRAVENOUS at 17:32

## 2020-07-30 RX ADMIN — FENTANYL CITRATE 25 MCG: 50 INJECTION INTRAMUSCULAR; INTRAVENOUS at 16:19

## 2020-07-30 RX ADMIN — FENTANYL CITRATE 25 MCG: 50 INJECTION INTRAMUSCULAR; INTRAVENOUS at 16:13

## 2020-07-30 RX ADMIN — OXYCODONE HYDROCHLORIDE AND ACETAMINOPHEN 2 TABLET: 5; 325 TABLET ORAL at 20:44

## 2020-07-30 RX ADMIN — AMITRIPTYLINE HYDROCHLORIDE 300 MG: 50 TABLET, FILM COATED ORAL at 20:46

## 2020-07-30 RX ADMIN — MORPHINE SULFATE 2 MG: 2 INJECTION, SOLUTION INTRAMUSCULAR; INTRAVENOUS at 22:13

## 2020-07-30 RX ADMIN — LEVETIRACETAM 1000 MG: 500 TABLET, FILM COATED ORAL at 20:45

## 2020-07-30 RX ADMIN — FENTANYL CITRATE 100 MCG: 50 INJECTION INTRAMUSCULAR; INTRAVENOUS at 14:19

## 2020-07-30 RX ADMIN — MORPHINE SULFATE 2 MG: 2 INJECTION, SOLUTION INTRAMUSCULAR; INTRAVENOUS at 02:46

## 2020-07-30 RX ADMIN — FENTANYL CITRATE 50 MCG: 50 INJECTION INTRAMUSCULAR; INTRAVENOUS at 16:04

## 2020-07-30 RX ADMIN — DULOXETINE HYDROCHLORIDE 30 MG: 30 CAPSULE, DELAYED RELEASE ORAL at 08:50

## 2020-07-30 RX ADMIN — HYDROMORPHONE HYDROCHLORIDE 0.5 MG: 1 INJECTION, SOLUTION INTRAMUSCULAR; INTRAVENOUS; SUBCUTANEOUS at 17:54

## 2020-07-30 RX ADMIN — FENTANYL CITRATE 25 MCG: 50 INJECTION INTRAMUSCULAR; INTRAVENOUS at 15:48

## 2020-07-30 RX ADMIN — FENTANYL CITRATE 75 MCG: 50 INJECTION INTRAMUSCULAR; INTRAVENOUS at 15:29

## 2020-07-30 RX ADMIN — MORPHINE SULFATE 2 MG: 2 INJECTION, SOLUTION INTRAMUSCULAR; INTRAVENOUS at 18:41

## 2020-07-30 RX ADMIN — NICOTINE 1 PATCH: 14 PATCH TRANSDERMAL at 08:51

## 2020-07-30 RX ADMIN — FENTANYL CITRATE 25 MCG: 50 INJECTION INTRAMUSCULAR; INTRAVENOUS at 16:45

## 2020-07-30 RX ADMIN — LIDOCAINE HYDROCHLORIDE 100 MG: 20 INJECTION, SOLUTION INFILTRATION; PERINEURAL at 14:19

## 2020-07-30 RX ADMIN — SODIUM CHLORIDE, PRESERVATIVE FREE 10 ML: 5 INJECTION INTRAVENOUS at 09:00

## 2020-07-30 RX ADMIN — SULFAMETHOXAZOLE AND TRIMETHOPRIM 160 MG: 800; 160 TABLET ORAL at 20:45

## 2020-07-30 RX ADMIN — HYDROMORPHONE HYDROCHLORIDE 0.5 MG: 1 INJECTION, SOLUTION INTRAMUSCULAR; INTRAVENOUS; SUBCUTANEOUS at 18:08

## 2020-07-30 RX ADMIN — MORPHINE SULFATE 2 MG: 2 INJECTION, SOLUTION INTRAMUSCULAR; INTRAVENOUS at 08:47

## 2020-07-30 RX ADMIN — PROPOFOL 200 MG: 10 INJECTION, EMULSION INTRAVENOUS at 14:19

## 2020-07-30 RX ADMIN — NEOSTIGMINE METHYLSULFATE 3 MG: 1 INJECTION INTRAMUSCULAR; INTRAVENOUS; SUBCUTANEOUS at 15:51

## 2020-07-30 RX ADMIN — SODIUM CHLORIDE, POTASSIUM CHLORIDE, SODIUM LACTATE AND CALCIUM CHLORIDE 9 ML/HR: 600; 310; 30; 20 INJECTION, SOLUTION INTRAVENOUS at 13:33

## 2020-07-30 RX ADMIN — ONDANSETRON HYDROCHLORIDE 4 MG: 2 SOLUTION INTRAMUSCULAR; INTRAVENOUS at 16:33

## 2020-07-30 RX ADMIN — FENTANYL CITRATE 50 MCG: 50 INJECTION, SOLUTION INTRAMUSCULAR; INTRAVENOUS at 13:34

## 2020-07-30 RX ADMIN — OXYCODONE HYDROCHLORIDE AND ACETAMINOPHEN 2 TABLET: 5; 325 TABLET ORAL at 04:22

## 2020-07-30 RX ADMIN — LEVETIRACETAM 1000 MG: 500 TABLET, FILM COATED ORAL at 08:50

## 2020-07-30 RX ADMIN — ROCURONIUM BROMIDE 30 MG: 10 INJECTION INTRAVENOUS at 14:19

## 2020-07-30 RX ADMIN — FAMOTIDINE 20 MG: 10 INJECTION INTRAVENOUS at 13:33

## 2020-07-30 RX ADMIN — NICOTINE 1 PATCH: 14 PATCH TRANSDERMAL at 09:00

## 2020-07-30 RX ADMIN — FENTANYL CITRATE 25 MCG: 50 INJECTION INTRAMUSCULAR; INTRAVENOUS at 16:49

## 2020-07-30 RX ADMIN — OXYCODONE HYDROCHLORIDE AND ACETAMINOPHEN 2 TABLET: 5; 325 TABLET ORAL at 11:56

## 2020-07-30 NOTE — ANESTHESIA PROCEDURE NOTES
Airway  Urgency: elective    Date/Time: 7/30/2020 2:22 PM  Airway not difficult    General Information and Staff    Patient location during procedure: OR  Anesthesiologist: Tico Moon DO  CRNA: Lisandra Hernandez CRNA    Indications and Patient Condition  Indications for airway management: airway protection    Preoxygenated: yes  MILS maintained throughout  Mask difficulty assessment: 1 - vent by mask    Final Airway Details  Final airway type: endotracheal airway      Successful airway: ETT  Cuffed: yes   Successful intubation technique: direct laryngoscopy  Facilitating devices/methods: intubating stylet  Endotracheal tube insertion site: oral  Blade: Aguilar  Blade size: 2  ETT size (mm): 7.0  Cormack-Lehane Classification: grade I - full view of glottis  Placement verified by: chest auscultation and capnometry   Measured from: lips  ETT/EBT  to lips (cm): 21  Number of attempts at approach: 1  Assessment: lips, teeth, and gum same as pre-op and atraumatic intubation

## 2020-07-30 NOTE — ANESTHESIA PREPROCEDURE EVALUATION
Anesthesia Evaluation     Patient summary reviewed and Nursing notes reviewed                Airway   Mallampati: II  Dental    (+) upper dentures    Pulmonary    (+) a smoker Current,   Cardiovascular - negative cardio ROS    ECG reviewed  Rhythm: regular  Rate: normal        Neuro/Psych  (+) seizures, psychiatric history Anxiety,     GI/Hepatic/Renal/Endo - negative ROS     Musculoskeletal (-) negative ROS    Abdominal    Substance History - negative use     OB/GYN negative ob/gyn ROS         Other                      Anesthesia Plan    ASA 3     general     intravenous induction     Anesthetic plan, all risks, benefits, and alternatives have been provided, discussed and informed consent has been obtained with: patient.
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
## 2020-07-31 LAB
ANION GAP SERPL CALCULATED.3IONS-SCNC: 11.2 MMOL/L (ref 5–15)
BUN SERPL-MCNC: 8 MG/DL (ref 6–20)
BUN/CREAT SERPL: 13.1 (ref 7–25)
CALCIUM SPEC-SCNC: 8.9 MG/DL (ref 8.6–10.5)
CHLORIDE SERPL-SCNC: 104 MMOL/L (ref 98–107)
CO2 SERPL-SCNC: 22.8 MMOL/L (ref 22–29)
CREAT SERPL-MCNC: 0.61 MG/DL (ref 0.57–1)
DEPRECATED RDW RBC AUTO: 41.3 FL (ref 37–54)
ERYTHROCYTE [DISTWIDTH] IN BLOOD BY AUTOMATED COUNT: 12.7 % (ref 12.3–15.4)
GFR SERPL CREATININE-BSD FRML MDRD: 104 ML/MIN/1.73
GLUCOSE SERPL-MCNC: 95 MG/DL (ref 65–99)
HCT VFR BLD AUTO: 35.1 % (ref 34–46.6)
HGB BLD-MCNC: 11.3 G/DL (ref 12–15.9)
MCH RBC QN AUTO: 28.5 PG (ref 26.6–33)
MCHC RBC AUTO-ENTMCNC: 32.2 G/DL (ref 31.5–35.7)
MCV RBC AUTO: 88.6 FL (ref 79–97)
PLATELET # BLD AUTO: 471 10*3/MM3 (ref 140–450)
PMV BLD AUTO: 8.3 FL (ref 6–12)
POTASSIUM SERPL-SCNC: 4.1 MMOL/L (ref 3.5–5.2)
RBC # BLD AUTO: 3.96 10*6/MM3 (ref 3.77–5.28)
SODIUM SERPL-SCNC: 138 MMOL/L (ref 136–145)
WBC # BLD AUTO: 9.87 10*3/MM3 (ref 3.4–10.8)

## 2020-07-31 PROCEDURE — 25010000002 MORPHINE PER 10 MG: Performed by: SURGERY

## 2020-07-31 PROCEDURE — 80048 BASIC METABOLIC PNL TOTAL CA: CPT | Performed by: SURGERY

## 2020-07-31 PROCEDURE — 25010000002 ONDANSETRON PER 1 MG: Performed by: SURGERY

## 2020-07-31 PROCEDURE — 85027 COMPLETE CBC AUTOMATED: CPT | Performed by: SURGERY

## 2020-07-31 RX ORDER — DULOXETIN HYDROCHLORIDE 30 MG/1
30 CAPSULE, DELAYED RELEASE ORAL DAILY
Status: DISCONTINUED | OUTPATIENT
Start: 2020-07-31 | End: 2020-08-06 | Stop reason: HOSPADM

## 2020-07-31 RX ADMIN — SODIUM CHLORIDE, PRESERVATIVE FREE 10 ML: 5 INJECTION INTRAVENOUS at 21:02

## 2020-07-31 RX ADMIN — LEVETIRACETAM 1000 MG: 500 TABLET, FILM COATED ORAL at 08:17

## 2020-07-31 RX ADMIN — MORPHINE SULFATE 2 MG: 2 INJECTION, SOLUTION INTRAMUSCULAR; INTRAVENOUS at 11:20

## 2020-07-31 RX ADMIN — OXYCODONE HYDROCHLORIDE AND ACETAMINOPHEN 2 TABLET: 5; 325 TABLET ORAL at 21:00

## 2020-07-31 RX ADMIN — MORPHINE SULFATE 2 MG: 2 INJECTION, SOLUTION INTRAMUSCULAR; INTRAVENOUS at 02:13

## 2020-07-31 RX ADMIN — MORPHINE SULFATE 2 MG: 2 INJECTION, SOLUTION INTRAMUSCULAR; INTRAVENOUS at 19:26

## 2020-07-31 RX ADMIN — DULOXETINE HYDROCHLORIDE 30 MG: 30 CAPSULE, DELAYED RELEASE ORAL at 12:52

## 2020-07-31 RX ADMIN — DOCUSATE SODIUM 100 MG: 100 CAPSULE, LIQUID FILLED ORAL at 21:00

## 2020-07-31 RX ADMIN — MORPHINE SULFATE 2 MG: 2 INJECTION, SOLUTION INTRAMUSCULAR; INTRAVENOUS at 15:22

## 2020-07-31 RX ADMIN — LEVETIRACETAM 1000 MG: 500 TABLET, FILM COATED ORAL at 23:01

## 2020-07-31 RX ADMIN — FERROUS SULFATE TAB 325 MG (65 MG ELEMENTAL FE) 325 MG: 325 (65 FE) TAB at 08:17

## 2020-07-31 RX ADMIN — NICOTINE 1 PATCH: 14 PATCH TRANSDERMAL at 08:21

## 2020-07-31 RX ADMIN — SULFAMETHOXAZOLE AND TRIMETHOPRIM 160 MG: 800; 160 TABLET ORAL at 08:17

## 2020-07-31 RX ADMIN — SULFAMETHOXAZOLE AND TRIMETHOPRIM 160 MG: 800; 160 TABLET ORAL at 23:01

## 2020-07-31 RX ADMIN — OXYCODONE HYDROCHLORIDE AND ACETAMINOPHEN 2 TABLET: 5; 325 TABLET ORAL at 04:48

## 2020-07-31 RX ADMIN — MORPHINE SULFATE 2 MG: 2 INJECTION, SOLUTION INTRAMUSCULAR; INTRAVENOUS at 07:17

## 2020-07-31 RX ADMIN — SODIUM CHLORIDE, PRESERVATIVE FREE 10 ML: 5 INJECTION INTRAVENOUS at 08:17

## 2020-07-31 RX ADMIN — OXYCODONE HYDROCHLORIDE AND ACETAMINOPHEN 2 TABLET: 5; 325 TABLET ORAL at 12:47

## 2020-07-31 RX ADMIN — ONDANSETRON 4 MG: 2 INJECTION INTRAMUSCULAR; INTRAVENOUS at 12:56

## 2020-07-31 RX ADMIN — AMITRIPTYLINE HYDROCHLORIDE 300 MG: 50 TABLET, FILM COATED ORAL at 23:01

## 2020-07-31 RX ADMIN — LORAZEPAM 2 MG: 1 TABLET ORAL at 21:00

## 2020-08-01 PROCEDURE — 25010000002 ONDANSETRON PER 1 MG: Performed by: SURGERY

## 2020-08-01 PROCEDURE — 25010000002 MORPHINE PER 10 MG: Performed by: SURGERY

## 2020-08-01 RX ADMIN — NICOTINE 1 PATCH: 14 PATCH TRANSDERMAL at 08:23

## 2020-08-01 RX ADMIN — DOCUSATE SODIUM 100 MG: 100 CAPSULE, LIQUID FILLED ORAL at 19:50

## 2020-08-01 RX ADMIN — SULFAMETHOXAZOLE AND TRIMETHOPRIM 160 MG: 800; 160 TABLET ORAL at 19:50

## 2020-08-01 RX ADMIN — LORAZEPAM 2 MG: 1 TABLET ORAL at 17:47

## 2020-08-01 RX ADMIN — LEVETIRACETAM 1000 MG: 500 TABLET, FILM COATED ORAL at 08:22

## 2020-08-01 RX ADMIN — ONDANSETRON 4 MG: 2 INJECTION INTRAMUSCULAR; INTRAVENOUS at 18:22

## 2020-08-01 RX ADMIN — SODIUM CHLORIDE, PRESERVATIVE FREE 10 ML: 5 INJECTION INTRAVENOUS at 19:53

## 2020-08-01 RX ADMIN — MORPHINE SULFATE 2 MG: 2 INJECTION, SOLUTION INTRAMUSCULAR; INTRAVENOUS at 02:25

## 2020-08-01 RX ADMIN — DULOXETINE HYDROCHLORIDE 30 MG: 30 CAPSULE, DELAYED RELEASE ORAL at 08:22

## 2020-08-01 RX ADMIN — SULFAMETHOXAZOLE AND TRIMETHOPRIM 160 MG: 800; 160 TABLET ORAL at 08:22

## 2020-08-01 RX ADMIN — MORPHINE SULFATE 2 MG: 2 INJECTION, SOLUTION INTRAMUSCULAR; INTRAVENOUS at 12:04

## 2020-08-01 RX ADMIN — MORPHINE SULFATE 2 MG: 2 INJECTION, SOLUTION INTRAMUSCULAR; INTRAVENOUS at 20:43

## 2020-08-01 RX ADMIN — MORPHINE SULFATE 2 MG: 2 INJECTION, SOLUTION INTRAMUSCULAR; INTRAVENOUS at 08:23

## 2020-08-01 RX ADMIN — HYDROXYZINE PAMOATE 50 MG: 50 CAPSULE ORAL at 02:25

## 2020-08-01 RX ADMIN — ONDANSETRON 4 MG: 2 INJECTION INTRAMUSCULAR; INTRAVENOUS at 10:00

## 2020-08-01 RX ADMIN — DOCUSATE SODIUM 100 MG: 100 CAPSULE, LIQUID FILLED ORAL at 08:23

## 2020-08-01 RX ADMIN — HYDROXYZINE PAMOATE 50 MG: 50 CAPSULE ORAL at 11:26

## 2020-08-01 RX ADMIN — LORAZEPAM 2 MG: 1 TABLET ORAL at 10:00

## 2020-08-01 RX ADMIN — OXYCODONE HYDROCHLORIDE AND ACETAMINOPHEN 2 TABLET: 5; 325 TABLET ORAL at 13:32

## 2020-08-01 RX ADMIN — SODIUM CHLORIDE, PRESERVATIVE FREE 10 ML: 5 INJECTION INTRAVENOUS at 10:01

## 2020-08-01 RX ADMIN — OXYCODONE HYDROCHLORIDE AND ACETAMINOPHEN 2 TABLET: 5; 325 TABLET ORAL at 19:51

## 2020-08-01 RX ADMIN — LEVETIRACETAM 1000 MG: 500 TABLET, FILM COATED ORAL at 19:50

## 2020-08-01 RX ADMIN — HYDROXYZINE PAMOATE 50 MG: 50 CAPSULE ORAL at 19:50

## 2020-08-01 RX ADMIN — OXYCODONE HYDROCHLORIDE AND ACETAMINOPHEN 2 TABLET: 5; 325 TABLET ORAL at 04:19

## 2020-08-01 RX ADMIN — MORPHINE SULFATE 2 MG: 2 INJECTION, SOLUTION INTRAMUSCULAR; INTRAVENOUS at 16:08
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
## 2020-08-01 NOTE — NURSING NOTE
[facility] center follow-up notes.    Per pt. nurse ALEXANDER Lu, the pt continues to be restless and thus needing non-violent restraints for safety reasons. Usefulness of restraints will be evaluated tomorrow. Per notes, the pt have continued to need her pain medications. Pt has requested nothing from [facility] at this time. We will continue to follow up with pt daily and provide necessary resources at discharge.
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
## 2020-08-01 NOTE — PLAN OF CARE
Problem: Patient Care Overview  Goal: Plan of Care Review  Outcome: Ongoing (interventions implemented as appropriate)  Flowsheets (Taken 8/1/2020 6973)  Progress: improving  Plan of Care Reviewed With: patient  Outcome Summary: vss, c/o pain medicated with iv morphine and percocet, up with assist, on posey restrain for non-behavioral, very anxious at the beginning of the shift, on CIWA and COWS q4h, on seizure precaution, do not remove dsg for 5 days per Dr. Evans,up with assist. continue to monitor the pt.

## 2020-08-02 PROCEDURE — 25010000002 ONDANSETRON PER 1 MG: Performed by: SURGERY

## 2020-08-02 PROCEDURE — 25010000002 MORPHINE PER 10 MG: Performed by: SURGERY

## 2020-08-02 RX ADMIN — MORPHINE SULFATE 2 MG: 2 INJECTION, SOLUTION INTRAMUSCULAR; INTRAVENOUS at 00:47

## 2020-08-02 RX ADMIN — SULFAMETHOXAZOLE AND TRIMETHOPRIM 160 MG: 800; 160 TABLET ORAL at 08:54

## 2020-08-02 RX ADMIN — LEVETIRACETAM 1000 MG: 500 TABLET, FILM COATED ORAL at 20:59

## 2020-08-02 RX ADMIN — DULOXETINE HYDROCHLORIDE 30 MG: 30 CAPSULE, DELAYED RELEASE ORAL at 08:54

## 2020-08-02 RX ADMIN — MORPHINE SULFATE 2 MG: 2 INJECTION, SOLUTION INTRAMUSCULAR; INTRAVENOUS at 20:59

## 2020-08-02 RX ADMIN — NICOTINE 1 PATCH: 14 PATCH TRANSDERMAL at 08:55

## 2020-08-02 RX ADMIN — AMITRIPTYLINE HYDROCHLORIDE 300 MG: 50 TABLET, FILM COATED ORAL at 00:47

## 2020-08-02 RX ADMIN — SODIUM CHLORIDE, PRESERVATIVE FREE 10 ML: 5 INJECTION INTRAVENOUS at 21:00

## 2020-08-02 RX ADMIN — MORPHINE SULFATE 2 MG: 2 INJECTION, SOLUTION INTRAMUSCULAR; INTRAVENOUS at 17:03

## 2020-08-02 RX ADMIN — LEVETIRACETAM 1000 MG: 500 TABLET, FILM COATED ORAL at 08:54

## 2020-08-02 RX ADMIN — MORPHINE SULFATE 2 MG: 2 INJECTION, SOLUTION INTRAMUSCULAR; INTRAVENOUS at 12:52

## 2020-08-02 RX ADMIN — AMITRIPTYLINE HYDROCHLORIDE 300 MG: 50 TABLET, FILM COATED ORAL at 23:25

## 2020-08-02 RX ADMIN — SODIUM CHLORIDE, PRESERVATIVE FREE 10 ML: 5 INJECTION INTRAVENOUS at 08:46

## 2020-08-02 RX ADMIN — LORAZEPAM 2 MG: 1 TABLET ORAL at 09:05

## 2020-08-02 RX ADMIN — OXYCODONE HYDROCHLORIDE AND ACETAMINOPHEN 2 TABLET: 5; 325 TABLET ORAL at 06:07

## 2020-08-02 RX ADMIN — ONDANSETRON 4 MG: 2 INJECTION INTRAMUSCULAR; INTRAVENOUS at 21:20

## 2020-08-02 RX ADMIN — OXYCODONE HYDROCHLORIDE AND ACETAMINOPHEN 2 TABLET: 5; 325 TABLET ORAL at 13:46

## 2020-08-02 RX ADMIN — SULFAMETHOXAZOLE AND TRIMETHOPRIM 160 MG: 800; 160 TABLET ORAL at 20:58

## 2020-08-02 RX ADMIN — MORPHINE SULFATE 2 MG: 2 INJECTION, SOLUTION INTRAMUSCULAR; INTRAVENOUS at 08:45

## 2020-08-02 RX ADMIN — OXYCODONE HYDROCHLORIDE AND ACETAMINOPHEN 2 TABLET: 5; 325 TABLET ORAL at 23:24

## 2020-08-02 RX ADMIN — FERROUS SULFATE TAB 325 MG (65 MG ELEMENTAL FE) 325 MG: 325 (65 FE) TAB at 08:54

## 2020-08-02 RX ADMIN — LORAZEPAM 2 MG: 1 TABLET ORAL at 15:31
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
## 2020-08-02 NOTE — NURSING NOTE
Patient awake. Nursing assistant getting patient back in bed after assisting to the bathroom. Patient remains unsteady. Patient underwent debridement and graft placement. Receiving dressing changes as recommended by plastic surgery. Patient has poor eye contact. Moaning. Patient is receiving pain medication prn. Access will continue to follow and provide with [facility] referrals closer to discharge.

## 2020-08-02 NOTE — PLAN OF CARE
Problem: Patient Care Overview  Goal: Plan of Care Review  Outcome: Ongoing (interventions implemented as appropriate)  Flowsheets (Taken 8/2/2020 0205)  Plan of Care Reviewed With: patient  Outcome Summary: appetite good.  ace wrap dressings dry and intact to RUE, RLE, LLE.  medicated with Morphine and Percocet prn and PO ativan for -101 and mild anxiety.  bed alarm in use for safety but pt noncompliant and gets oob without staff assist.   continuously asking when she can go home.  discussed MD note stating being here at least 6 days postop.

## 2020-08-02 NOTE — PLAN OF CARE
Problem: Patient Care Overview  Goal: Plan of Care Review  Outcome: Ongoing (interventions implemented as appropriate)  Flowsheets (Taken 8/2/2020 1801)  Progress: improving  Plan of Care Reviewed With: patient  Outcome Summary: vss, restrained removed yesterday afternoon, no sign of agitation, with dressing in place, up whith assist, on bed alarm for safety, continue to monitor the pt.

## 2020-08-03 PROCEDURE — 25010000002 ONDANSETRON PER 1 MG: Performed by: SURGERY

## 2020-08-03 PROCEDURE — 25010000002 MORPHINE PER 10 MG: Performed by: SURGERY

## 2020-08-03 RX ORDER — MORPHINE SULFATE 2 MG/ML
4 INJECTION, SOLUTION INTRAMUSCULAR; INTRAVENOUS ONCE
Status: COMPLETED | OUTPATIENT
Start: 2020-08-04 | End: 2020-08-03

## 2020-08-03 RX ADMIN — NICOTINE 1 PATCH: 14 PATCH TRANSDERMAL at 08:34

## 2020-08-03 RX ADMIN — LEVETIRACETAM 1000 MG: 500 TABLET, FILM COATED ORAL at 21:00

## 2020-08-03 RX ADMIN — MORPHINE SULFATE 2 MG: 2 INJECTION, SOLUTION INTRAMUSCULAR; INTRAVENOUS at 08:33

## 2020-08-03 RX ADMIN — MORPHINE SULFATE 2 MG: 2 INJECTION, SOLUTION INTRAMUSCULAR; INTRAVENOUS at 13:01

## 2020-08-03 RX ADMIN — LEVETIRACETAM 1000 MG: 500 TABLET, FILM COATED ORAL at 08:31

## 2020-08-03 RX ADMIN — MORPHINE SULFATE 2 MG: 2 INJECTION, SOLUTION INTRAMUSCULAR; INTRAVENOUS at 16:50

## 2020-08-03 RX ADMIN — HYDROXYZINE PAMOATE 50 MG: 50 CAPSULE ORAL at 21:00

## 2020-08-03 RX ADMIN — SODIUM CHLORIDE, PRESERVATIVE FREE 10 ML: 5 INJECTION INTRAVENOUS at 10:10

## 2020-08-03 RX ADMIN — OXYCODONE HYDROCHLORIDE AND ACETAMINOPHEN 2 TABLET: 5; 325 TABLET ORAL at 10:09

## 2020-08-03 RX ADMIN — DULOXETINE HYDROCHLORIDE 30 MG: 30 CAPSULE, DELAYED RELEASE ORAL at 08:31

## 2020-08-03 RX ADMIN — AMITRIPTYLINE HYDROCHLORIDE 300 MG: 50 TABLET, FILM COATED ORAL at 22:02

## 2020-08-03 RX ADMIN — DOCUSATE SODIUM 100 MG: 100 CAPSULE, LIQUID FILLED ORAL at 13:09

## 2020-08-03 RX ADMIN — ONDANSETRON 4 MG: 2 INJECTION INTRAMUSCULAR; INTRAVENOUS at 19:06

## 2020-08-03 RX ADMIN — HYDROXYZINE PAMOATE 50 MG: 50 CAPSULE ORAL at 14:21

## 2020-08-03 RX ADMIN — ONDANSETRON 4 MG: 2 INJECTION INTRAMUSCULAR; INTRAVENOUS at 13:09

## 2020-08-03 RX ADMIN — OXYCODONE HYDROCHLORIDE AND ACETAMINOPHEN 2 TABLET: 5; 325 TABLET ORAL at 18:04

## 2020-08-03 RX ADMIN — MORPHINE SULFATE 4 MG: 2 INJECTION, SOLUTION INTRAMUSCULAR; INTRAVENOUS at 23:19

## 2020-08-03 RX ADMIN — MORPHINE SULFATE 2 MG: 2 INJECTION, SOLUTION INTRAMUSCULAR; INTRAVENOUS at 03:16

## 2020-08-03 RX ADMIN — SODIUM CHLORIDE, PRESERVATIVE FREE 10 ML: 5 INJECTION INTRAVENOUS at 21:01

## 2020-08-03 RX ADMIN — HYDROXYZINE PAMOATE 50 MG: 50 CAPSULE ORAL at 08:33

## 2020-08-03 RX ADMIN — MORPHINE SULFATE 2 MG: 2 INJECTION, SOLUTION INTRAMUSCULAR; INTRAVENOUS at 20:59

## 2020-08-03 NOTE — NURSING NOTE
Abed, appears asleep. Patient has not required restraints or sitter. Receiving pain medication. Spoke with RN. Patient continues to push for discharge.

## 2020-08-03 NOTE — PAYOR COMM NOTE
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 7.5-325 MG per tablet 1 tablet   Dose: 1 tablet  Freq: Once As Needed Route: PO  PRN Reason: Mild Pain   Start: 07/30/20 1650 End: 07/30/20 1833         HYDROcodone-acetaminophen (NORCO) 7.5-325 MG per tablet 1 tablet   Dose: 1 tablet  Freq: Once As Needed Route: PO  PRN Reason: Mild Pain   Start: 07/26/20 1304 End: 07/26/20 1414         HYDROmorphone (DILAUDID) injection 0.5 mg   Dose: 0.5 mg  Freq: Every 5 Minutes PRN Route: IV  PRN Reasons: Moderate Pain ,Severe Pain   Start: 07/30/20 1650 End: 07/30/20 1833    Admin Instructions:   May alternate fentanyl with hydromorphone using fentanyl first.    Maximum total dose of hydromorphone is 2 mg.  If given for pain, use the following pain scale:  Mild Pain = Pain Score of 1-3, CPOT 1-2  Moderate Pain = Pain Score of 4-6, CPOT 3-4  Severe Pain = Pain Score of 7-10, CPOT 5-8         HYDROmorphone (DILAUDID) injection 0.5 mg   Dose: 0.5 mg  Freq: Every 5 Minutes PRN Route: IV  PRN Reasons: Moderate Pain ,Severe Pain   Start: 07/26/20 1304 End: 07/26/20 1414    Admin Instructions:   May alternate fentanyl with hydromorphone using fentanyl first.    Maximum total dose of hydromorphone is 2 mg.  If given for pain, use the following pain scale:  Mild Pain = Pain Score of 1-3, CPOT 1-2  Moderate Pain = Pain Score of 4-6, CPOT  3-4  Severe Pain = Pain Score of 7-10, CPOT 5-8         hydrOXYzine (ATARAX) tablet 25 mg   Dose: 25 mg  Freq: 4 Times Daily PRN Route: PO  PRN Reason: Anxiety  Start: 07/29/20 1630 End: 07/30/20 1249    Admin Instructions:   Caution: Look alike/sound alike drug alert         hydrOXYzine pamoate (VISTARIL) capsule 50 mg   Dose: 50 mg  Freq: Every 6 Hours PRN Route: PO  PRN Reason: Anxiety  Start: 07/30/20 1103    Admin Instructions:   Caution: Look alike/sound alike drug alert    0225   1126   1950       0833            ketamine (KETALAR) injection   Freq: As Needed  Start: 07/30/20 1448 End: 07/30/20 1704         ketamine (KETALAR) injection   Freq: As Needed  Start: 07/26/20 1222 End: 07/26/20 1311         labetalol (NORMODYNE,TRANDATE) injection 5 mg   Dose: 5 mg  Freq: Every 5 Minutes PRN Route: IV  PRN Reason: High Blood Pressure  PRN Comment: for systolic blood pressure greater than 180 mmHg or diastolic blood pressure greater than 105 mmHg  Start: 07/30/20 1650 End: 07/30/20 1833    Admin Instructions:   Use labetalol first THEN hydralazine second if labetalol fails to reduce systolic blood pressure to less than 180 mmHg or diastolic blood pressure less than 105 mmHg    Hold for heart rate less than 60.    Maximum dose of labetalol is 20 mg  Give by slow IV Push each 20mg (or less) over 2 minutes         labetalol (NORMODYNE,TRANDATE) injection 5 mg   Dose: 5 mg  Freq: Every 5 Minutes PRN Route: IV  PRN Reason: High Blood Pressure  PRN Comment: for systolic blood pressure greater than 180 mmHg or diastolic blood pressure greater than 105 mmHg  Start: 07/26/20 1304 End: 07/26/20 1414    Admin Instructions:   Use labetalol first THEN hydralazine second if labetalol fails to reduce systolic blood pressure to less than 180 mmHg or diastolic blood pressure less than 105 mmHg    Hold for heart rate less than 60.    Maximum dose of labetalol is 20 mg  Give by slow IV Push each 20mg (or less) over 2 minutes          lidocaine (XYLOCAINE) 2% injection   Freq: As Needed Route: IJ  Start: 07/30/20 1419 End: 07/30/20 1704         lidocaine PF 1% (XYLOCAINE) injection 0.5 mL   Dose: 0.5 mL  Freq: Once As Needed Route: IJ  PRN Comment: IV Start  Start: 07/30/20 1325 End: 07/30/20 1700         lidocaine PF 1% (XYLOCAINE) injection 0.5 mL   Dose: 0.5 mL  Freq: Once As Needed Route: IJ  PRN Comment: IV Start  Start: 07/26/20 1045 End: 07/26/20 1313         LORazepam (ATIVAN) tablet 2 mg   Dose: 2 mg  Freq: Every 6 Hours PRN Route: PO  PRN Reason: Withdrawal  PRN Comment: Give for diastolic blood pressure greater than 95 and/or heart rate greater than 95.  Do not give for subjective anxiety.  Start: 07/30/20 1102 End: 08/09/20 1101    Admin Instructions:    Caution: Look alike/sound alike drug alert    0421   1000   1747      0905   1531                  midazolam (VERSED) injection   Freq: As Needed Route: IV  Start: 07/26/20 1145 End: 07/26/20 1311         midazolam (VERSED) injection 1 mg   Dose: 1 mg  Freq: Every 10 Minutes PRN Route: IV  PRN Reason: Anxiety  Indications of Use: ANXIETY  Start: 07/30/20 1325 End: 07/30/20 1700    Admin Instructions:   May repeat dose in 10 minutes x 1 then contact provider for additional orders.           midazolam (VERSED) injection 1 mg   Dose: 1 mg  Freq: Every 10 Minutes PRN Route: IV  PRN Reason: Anxiety  Indications of Use: ANXIETY  Start: 07/26/20 1045 End: 07/26/20 1313    Admin Instructions:   May repeat dose in 10 minutes x 1 then contact provider for additional orders.           mineral oil liquid   Freq: As Needed  Start: 07/30/20 1410 End: 07/30/20 1658         morphine injection 2 mg   Dose: 2 mg  Freq: Every 4 Hours PRN Route: IV  PRN Reason: Severe Pain   Start: 07/29/20 0835 End: 08/05/20 0834    Admin Instructions:   If given for pain, use the following pain scale:  Mild Pain = Pain Score of 1-3, CPOT 1-2  Moderate Pain = Pain Score of 4-6, CPOT 3-4  Severe Pain = Pain Score of  7-10, CPOT 5-8    0225   0823   1204     1608   2043        0047   0845   1252     1703   2059         0316   0833        naloxone (NARCAN) injection 0.2 mg   Dose: 0.2 mg  Freq: As Needed Route: IV  PRN Reasons: Opioid Reversal,Respiratory Depression  PRN Comment: unresponsiveness, decrease oxygen saturation  Indications of Use: ACUTE RESPIRATORY FAILURE,OPIOID-INDUCED RESPIRATORY DEPRESSION  Start: 07/30/20 1650 End: 07/30/20 1833    Admin Instructions:   Notify Anesthesia if given         naloxone (NARCAN) injection 0.2 mg   Dose: 0.2 mg  Freq: As Needed Route: IV  PRN Reasons: Opioid Reversal,Respiratory Depression  PRN Comment: unresponsiveness, decrease oxygen saturation  Indications of Use: ACUTE RESPIRATORY FAILURE,OPIOID-INDUCED RESPIRATORY DEPRESSION  Start: 07/26/20 1304 End: 07/26/20 1414    Admin Instructions:   Notify Anesthesia if given         neostigmine (PROSTIGMINE) injection   Freq: As Needed Route: IV  Start: 07/30/20 1551 End: 07/30/20 1704         ondansetron (ZOFRAN) injection   Freq: As Needed Route: IV  Start: 07/30/20 1633 End: 07/30/20 1704         ondansetron (ZOFRAN) injection   Freq: As Needed  Start: 07/26/20 1252 End: 07/26/20 1311         ondansetron (ZOFRAN) injection 4 mg   Dose: 4 mg  Freq: Once As Needed Route: IV  PRN Reasons: Nausea,Vomiting  Indications of Use: POSTOPERATIVE NAUSEA AND VOMITING  Start: 07/30/20 1650 End: 07/30/20 1833    Admin Instructions:   If BOTH ondansetron (ZOFRAN) and promethazine (PHENERGAN) are ordered use ondansetron first and THEN promethazine IF ondansetron is ineffective.         ondansetron (ZOFRAN) injection 4 mg   Dose: 4 mg  Freq: Once As Needed Route: IV  PRN Reasons: Nausea,Vomiting  Indications of Use: POSTOPERATIVE NAUSEA AND VOMITING  Start: 07/26/20 1304 End: 07/26/20 1414    Admin Instructions:   If BOTH ondansetron (ZOFRAN) and promethazine (PHENERGAN) are ordered use ondansetron first and THEN promethazine IF ondansetron is  ineffective.         ondansetron (ZOFRAN) tablet 4 mg   Dose: 4 mg  Freq: Every 6 Hours PRN Route: PO  PRN Reasons: Nausea,Vomiting  Start: 07/24/20 1415    Admin Instructions:   If BOTH ondansetron (ZOFRAN) and promethazine (PHENERGAN) are ordered use ondansetron first and THEN promethazine IF ondansetron is ineffective.                             Or  ondansetron (ZOFRAN) injection 4 mg   Dose: 4 mg  Freq: Every 6 Hours PRN Route: IV  PRN Reasons: Nausea,Vomiting  Start: 07/24/20 1415    Admin Instructions:   If BOTH ondansetron (ZOFRAN) and promethazine (PHENERGAN) are ordered use ondansetron first and THEN promethazine IF ondansetron is ineffective.    1000   1822        2120                    oxyCODONE-acetaminophen (PERCOCET) 5-325 MG per tablet 1 tablet   Dose: 1 tablet  Freq: Every 8 Hours PRN Route: PO  PRN Reason: Moderate Pain   Start: 07/25/20 1416 End: 07/26/20 1608    Admin Instructions:   [EVARISTO]    Do not exceed 4 grams of acetaminophen in a 24 hr period.    If given for pain, use the following pain scale:   Mild Pain = Pain Score of 1-3, CPOT 1-2  Moderate Pain = Pain Score of 4-6, CPOT 3-4  Severe Pain = Pain Score of 7-10, CPOT 5-8         Followed by  oxyCODONE-acetaminophen (PERCOCET) 5-325 MG per tablet 2 tablet   Dose: 2 tablet  Freq: Every 8 Hours PRN Route: PO  PRN Reasons: Moderate Pain ,Severe Pain   Start: 08/04/20 1416 End: 07/26/20 1608    Admin Instructions:   [EVARISTO]    Do not exceed 4 grams of acetaminophen in a 24 hr period.    If given for pain, use the following pain scale:   Mild Pain = Pain Score of 1-3, CPOT 1-2  Moderate Pain = Pain Score of 4-6, CPOT 3-4  Severe Pain = Pain Score of 7-10, CPOT 5-8         oxyCODONE-acetaminophen (PERCOCET) 5-325 MG per tablet 2 tablet   Dose: 2 tablet  Freq: Every 8 Hours PRN Route: PO  PRN Reason: Severe Pain   Start: 07/28/20 1626 End: 08/07/20 1625    Admin Instructions:   [EVARISTO]    Do not exceed 4 grams of acetaminophen in a 24 hr  period.    If given for pain, use the following pain scale:   Mild Pain = Pain Score of 1-3, CPOT 1-2  Moderate Pain = Pain Score of 4-6, CPOT 3-4  Severe Pain = Pain Score of 7-10, CPOT 5-8    0419   1332   1951      0607   1346   2324      1009            oxyCODONE-acetaminophen (PERCOCET) 5-325 MG per tablet 2 tablet   Dose: 2 tablet  Freq: Every 8 Hours PRN Route: PO  PRN Reason: Severe Pain   Start: 07/25/20 1319 End: 07/25/20 1416    Admin Instructions:   [EVARISTO]    Do not exceed 4 grams of acetaminophen in a 24 hr period.    If given for pain, use the following pain scale:   Mild Pain = Pain Score of 1-3, CPOT 1-2  Moderate Pain = Pain Score of 4-6, CPOT 3-4  Severe Pain = Pain Score of 7-10, CPOT 5-8         oxyCODONE-acetaminophen (PERCOCET) 7.5-325 MG per tablet 1 tablet   Dose: 1 tablet  Freq: Once As Needed Route: PO  PRN Reason: Moderate Pain   Start: 07/30/20 1650 End: 07/30/20 1833         oxyCODONE-acetaminophen (PERCOCET) 7.5-325 MG per tablet 1 tablet   Dose: 1 tablet  Freq: Once As Needed Route: PO  PRN Reason: Moderate Pain   Start: 07/27/20 0349 End: 07/27/20 0358    Admin Instructions:   [EVARISTO]    Do not exceed 4 grams of acetaminophen in a 24 hr period.    If given for pain, use the following pain scale:   Mild Pain = Pain Score of 1-3, CPOT 1-2  Moderate Pain = Pain Score of 4-6, CPOT 3-4  Severe Pain = Pain Score of 7-10, CPOT 5-8         oxyCODONE-acetaminophen (PERCOCET) 7.5-325 MG per tablet 1 tablet   Dose: 1 tablet  Freq: Every 8 Hours PRN Route: PO  PRN Reason: Moderate Pain   Start: 07/26/20 2217 End: 07/28/20 1626    Admin Instructions:   [EVARISTO]    Do not exceed 4 grams of acetaminophen in a 24 hr period.    If given for pain, use the following pain scale:   Mild Pain = Pain Score of 1-3, CPOT 1-2  Moderate Pain = Pain Score of 4-6, CPOT 3-4  Severe Pain = Pain Score of 7-10, CPOT 5-8         oxyCODONE-acetaminophen (PERCOCET) 7.5-325 MG per tablet 1 tablet   Dose: 1 tablet  Freq: Once  As Needed Route: PO  PRN Reason: Moderate Pain   Start: 07/26/20 1304 End: 07/26/20 1338         Pharmacy to dose vancomycin   Freq: Continuous PRN Route: XX  PRN Reason: Consult  Indications of Use: SKIN AND SOFT TISSUE INFECTION  Start: 07/25/20 0000 End: 07/27/20 0834         phenylephrine (BETHANY-SYNEPHRINE) injection   Freq: As Needed Route: IV  Start: 07/30/20 1429 End: 07/30/20 1704         potassium chloride (KLOR-CON) packet 40 mEq   Dose: 40 mEq  Freq: As Needed Route: PO  PRN Comment: Potassium Replacement, See Admin Instructions  Start: 07/25/20 1123    Admin Instructions:   Potassium 3.1 or Less Give KCl 40 mEq q4h x3 Doses   Potassium 3.2 - 3.6 Give KCl 40 mEq q4h x2 Doses     Check Potassium 4 Hours After Last Dose Given   Check Magnesium if Potassium Level Remains Low After Replacement   DO NOT GIVE if CrCl is Less Than 30 mL/minute or Urine Output Less Than 30 mL/hr         potassium chloride (MICRO-K) CR capsule 40 mEq   Dose: 40 mEq  Freq: As Needed Route: PO  PRN Comment: Potassium Replacement.  See Admin Instructions  Start: 07/25/20 1123    Admin Instructions:   Potassium 3.1 or Less Give KCl 40 mEq q4h x3 Doses   Potassium 3.2 - 3.6 Give KCl 40 mEq q4h x2 Doses     Check Potassium 4 Hours After Last Dose Given   Check Magnesium if Potassium Level Remains Low After Replacement   DO NOT GIVE if CrCl is Less Than 30 mL/minute or Urine Output Less Than 30 mL/hr         promethazine (PHENERGAN) injection 6.25 mg   Dose: 6.25 mg  Freq: Every 10 Minutes PRN Route: IV  PRN Reasons: Nausea,Vomiting  Start: 07/30/20 1650 End: 07/30/20 1833    Admin Instructions:   If BOTH ondansetron (ZOFRAN) and promethazine (PHENERGAN) are ordered use ondansetron first and THEN promethazine IF ondansetron is ineffective.    Maximum total dose of IV phenergan is 12.5mg  IF GIVING IV ONLY, dilute dose in 20 mL NS and infuse over 10 minutes. Administer through large bore vein (not hand or wrist) through running IV line at  port furthest from patient's vein.         Or  promethazine (PHENERGAN) injection 12.5 mg   Dose: 12.5 mg  Freq: Once As Needed Route: IM  PRN Reasons: Nausea,Vomiting  Start: 07/30/20 1650 End: 07/30/20 1833    Admin Instructions:   If BOTH ondansetron (ZOFRAN) and promethazine (PHENERGAN) are ordered use ondansetron first and THEN promethazine IF ondansetron is ineffective.  IF GIVING IV ONLY, dilute dose in 20 mL NS and infuse over 10 minutes. Administer through large bore vein (not hand or wrist) through running IV line at port furthest from patient's vein.         Or  promethazine (PHENERGAN) suppository 25 mg   Dose: 25 mg  Freq: Once As Needed Route: RE  PRN Reasons: Nausea,Vomiting  Start: 07/30/20 1650 End: 07/30/20 1833    Admin Instructions:   If BOTH ondansetron (ZOFRAN) and promethazine (PHENERGAN) are ordered use ondansetron first and THEN promethazine IF ondansetron is ineffective.         Or  promethazine (PHENERGAN) tablet 25 mg   Dose: 25 mg  Freq: Once As Needed Route: PO  PRN Reasons: Nausea,Vomiting  Start: 07/30/20 1650 End: 07/30/20 1833    Admin Instructions:   If BOTH ondansetron (ZOFRAN) and promethazine (PHENERGAN) are ordered use ondansetron first and THEN promethazine IF ondansetron is ineffective.           promethazine (PHENERGAN) injection 6.25 mg   Dose: 6.25 mg  Freq: Every 10 Minutes PRN Route: IV  PRN Reasons: Nausea,Vomiting  Start: 07/26/20 1304 End: 07/26/20 1414    Admin Instructions:   If BOTH ondansetron (ZOFRAN) and promethazine (PHENERGAN) are ordered use ondansetron first and THEN promethazine IF ondansetron is ineffective.    Maximum total dose of IV phenergan is 12.5mg  IF GIVING IV ONLY, dilute dose in 20 mL NS and infuse over 10 minutes. Administer through large bore vein (not hand or wrist) through running IV line at port furthest from patient's vein.         Or  promethazine (PHENERGAN) injection 12.5 mg   Dose: 12.5 mg  Freq: Once As Needed Route: IM  PRN Reasons:  Nausea,Vomiting  Start: 07/26/20 1304 End: 07/26/20 1414    Admin Instructions:   If BOTH ondansetron (ZOFRAN) and promethazine (PHENERGAN) are ordered use ondansetron first and THEN promethazine IF ondansetron is ineffective.  IF GIVING IV ONLY, dilute dose in 20 mL NS and infuse over 10 minutes. Administer through large bore vein (not hand or wrist) through running IV line at port furthest from patient's vein.         Or  promethazine (PHENERGAN) suppository 25 mg   Dose: 25 mg  Freq: Once As Needed Route: RE  PRN Reasons: Nausea,Vomiting  Start: 07/26/20 1304 End: 07/26/20 1414    Admin Instructions:   If BOTH ondansetron (ZOFRAN) and promethazine (PHENERGAN) are ordered use ondansetron first and THEN promethazine IF ondansetron is ineffective.         Or  promethazine (PHENERGAN) tablet 25 mg   Dose: 25 mg  Freq: Once As Needed Route: PO  PRN Reasons: Nausea,Vomiting  Start: 07/26/20 1304 End: 07/26/20 1414    Admin Instructions:   If BOTH ondansetron (ZOFRAN) and promethazine (PHENERGAN) are ordered use ondansetron first and THEN promethazine IF ondansetron is ineffective.           Propofol (DIPRIVAN) injection   Freq: As Needed Route: IV  Start: 07/30/20 1419 End: 07/30/20 1704         Propofol (DIPRIVAN) injection   Freq: As Needed Route: IV  Start: 07/26/20 1148 End: 07/26/20 1311         rocuronium (ZEMURON) injection   Freq: As Needed Route: IV  Start: 07/30/20 1419 End: 07/30/20 1704         silver sulfadiazine (SILVADENE, SSD) 1 % cream   Freq: As Needed  Start: 07/26/20 1238 End: 07/26/20 1305         sodium chloride (NS) irrigation solution   Freq: As Needed  Start: 07/30/20 1410 End: 07/30/20 1658         sodium chloride (NS) irrigation solution   Freq: As Needed  Start: 07/26/20 1128 End: 07/26/20 1305         sodium chloride 0.9 % flush 10 mL   Dose: 10 mL  Freq: As Needed Route: IV  PRN Reason: Line Care  Start: 07/24/20 1415         sodium chloride 0.9 % flush 3-10 mL   Dose: 3-10 mL  Freq: As  Needed Route: IV  PRN Reason: Line Care  Start: 07/30/20 1325 End: 07/30/20 1700         sodium chloride 0.9 % flush 3-10 mL   Dose: 3-10 mL  Freq: As Needed Route: IV  PRN Reason: Line Care  Start: 07/26/20 1045 End: 07/26/20 1313         sodium chloride 100 mL, bupivacaine-EPINEPHrine PF 60 mL mixture   Freq: As Needed  Start: 07/30/20 1410 End: 07/30/20 1658         sodium chloride 100 mL, lidocaine-EPINEPHrine 60 mL mixture   Freq: As Needed  Start: 07/30/20 1410 End: 07/30/20 1658         traMADol (ULTRAM) tablet 50 mg   Dose: 50 mg  Freq: Every 6 Hours PRN Route: PO  PRN Reason: Moderate Pain   Start: 07/26/20 1607 End: 07/26/20 2216    Admin Instructions:       Caution: Look alike/sound alike drug alert    If given for pain, use the following pain scale:  Mild Pain = Pain Score of 1-3, CPOT 1-2  Moderate Pain = Pain Score of 4-6, CPOT 3-4  Severe Pain = Pain Score of 7-10, CPOT 5-8         traMADol (ULTRAM) tablet 50 mg   Dose: 50 mg  Freq: Every 6 Hours PRN Route: PO  PRN Reason: Moderate Pain   Start: 07/24/20 1415 End: 07/25/20 1319    Admin Instructions:       Caution: Look alike/sound alike drug alert    If given for pain, use the following pain scale:  Mild Pain = Pain Score of 1-3, CPOT 1-2  Moderate Pain = Pain Score of 4-6, CPOT 3-4  Severe Pain = Pain Score of 7-10, CPOT 5-8         Medications 08/01/20 08/02/20 08/03/20           Consult Notes (last 72 hours) (Notes from 07/31/20 1114 through 08/03/20 1114)    No notes of this type exist for this encounter.
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
## 2020-08-03 NOTE — PLAN OF CARE
Problem: Patient Care Overview  Goal: Plan of Care Review  Outcome: Ongoing (interventions implemented as appropriate)  Flowsheets (Taken 8/3/2020 8484)  Progress: improving  Plan of Care Reviewed With: patient  Outcome Summary: VSS. Afebrile. ace wrap dressing CDI on RUE. RLE, LLE. medicated with morphine and percocet prn and po zofran given once. Bed alarm on for safety but pt noncompliant and gets oob without staff assist. continuously asking when she can go home and for medicine. CIWA and COWS done q4h. Up with assist to bathroom (when she actually rings). Will continue to monitor

## 2020-08-04 LAB
ANION GAP SERPL CALCULATED.3IONS-SCNC: 8.6 MMOL/L (ref 5–15)
BUN SERPL-MCNC: 14 MG/DL (ref 6–20)
BUN/CREAT SERPL: 27.5 (ref 7–25)
CALCIUM SPEC-SCNC: 9.6 MG/DL (ref 8.6–10.5)
CHLORIDE SERPL-SCNC: 103 MMOL/L (ref 98–107)
CO2 SERPL-SCNC: 26.4 MMOL/L (ref 22–29)
CREAT SERPL-MCNC: 0.51 MG/DL (ref 0.57–1)
GFR SERPL CREATININE-BSD FRML MDRD: 128 ML/MIN/1.73
GLUCOSE SERPL-MCNC: 105 MG/DL (ref 65–99)
POTASSIUM SERPL-SCNC: 4.8 MMOL/L (ref 3.5–5.2)
SODIUM SERPL-SCNC: 138 MMOL/L (ref 136–145)

## 2020-08-04 PROCEDURE — 25010000002 MORPHINE PER 10 MG: Performed by: NURSE PRACTITIONER

## 2020-08-04 PROCEDURE — 25010000002 ONDANSETRON PER 1 MG: Performed by: SURGERY

## 2020-08-04 PROCEDURE — 63710000001 ONDANSETRON PER 8 MG: Performed by: SURGERY

## 2020-08-04 PROCEDURE — 80048 BASIC METABOLIC PNL TOTAL CA: CPT | Performed by: INTERNAL MEDICINE

## 2020-08-04 PROCEDURE — 25010000002 MORPHINE PER 10 MG: Performed by: SURGERY

## 2020-08-04 RX ADMIN — SODIUM CHLORIDE, PRESERVATIVE FREE 10 ML: 5 INJECTION INTRAVENOUS at 08:52

## 2020-08-04 RX ADMIN — NICOTINE 1 PATCH: 14 PATCH TRANSDERMAL at 08:52

## 2020-08-04 RX ADMIN — ONDANSETRON 4 MG: 2 INJECTION INTRAMUSCULAR; INTRAVENOUS at 04:19

## 2020-08-04 RX ADMIN — ONDANSETRON HYDROCHLORIDE 4 MG: 4 TABLET, FILM COATED ORAL at 18:40

## 2020-08-04 RX ADMIN — OXYCODONE HYDROCHLORIDE AND ACETAMINOPHEN 2 TABLET: 5; 325 TABLET ORAL at 07:06

## 2020-08-04 RX ADMIN — HYDROXYZINE PAMOATE 50 MG: 50 CAPSULE ORAL at 20:30

## 2020-08-04 RX ADMIN — AMITRIPTYLINE HYDROCHLORIDE 300 MG: 50 TABLET, FILM COATED ORAL at 22:52

## 2020-08-04 RX ADMIN — MORPHINE SULFATE 2 MG: 2 INJECTION, SOLUTION INTRAMUSCULAR; INTRAVENOUS at 08:51

## 2020-08-04 RX ADMIN — MORPHINE SULFATE 2 MG: 2 INJECTION, SOLUTION INTRAMUSCULAR; INTRAVENOUS at 16:28

## 2020-08-04 RX ADMIN — SODIUM CHLORIDE, PRESERVATIVE FREE 10 ML: 5 INJECTION INTRAVENOUS at 20:30

## 2020-08-04 RX ADMIN — LEVETIRACETAM 1000 MG: 500 TABLET, FILM COATED ORAL at 08:51

## 2020-08-04 RX ADMIN — LEVETIRACETAM 1000 MG: 500 TABLET, FILM COATED ORAL at 20:29

## 2020-08-04 RX ADMIN — MORPHINE SULFATE 2 MG: 2 INJECTION, SOLUTION INTRAMUSCULAR; INTRAVENOUS at 20:29

## 2020-08-04 RX ADMIN — MORPHINE SULFATE 2 MG: 2 INJECTION, SOLUTION INTRAMUSCULAR; INTRAVENOUS at 12:43

## 2020-08-04 RX ADMIN — DULOXETINE HYDROCHLORIDE 30 MG: 30 CAPSULE, DELAYED RELEASE ORAL at 08:51

## 2020-08-04 RX ADMIN — OXYCODONE HYDROCHLORIDE AND ACETAMINOPHEN 2 TABLET: 5; 325 TABLET ORAL at 16:27

## 2020-08-04 RX ADMIN — MORPHINE SULFATE 2 MG: 2 INJECTION, SOLUTION INTRAMUSCULAR; INTRAVENOUS at 03:23

## 2020-08-04 RX ADMIN — FERROUS SULFATE TAB 325 MG (65 MG ELEMENTAL FE) 325 MG: 325 (65 FE) TAB at 08:51
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
## 2020-08-04 NOTE — PLAN OF CARE
Problem: Patient Care Overview  Goal: Plan of Care Review  Outcome: Ongoing (interventions implemented as appropriate)  Flowsheets (Taken 8/4/2020 0408)  Progress: no change  Plan of Care Reviewed With: patient  Outcome Summary: pt has stitches on the right side of forehead, healed site. pt would like them removed - no order to remove.   VSS. afebrile. ace wrap dressing CDI on RUE, RLE, LLE. medicated with morphine and percocet prn. Anxious - vistaril given. C/o nausea - zofran given. CIWA and COWS done. Fall precautions maintained- pt not compliant with that. Isolation precautions maintained. Saline locked. Will continue to monitor.

## 2020-08-04 NOTE — PROGRESS NOTES
[facility] did follow up with pt. Pt states she will look into [facility] as she has been thru many programs in the past. Pt pleasant and eating lunch.
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
## 2020-08-05 ENCOUNTER — ANESTHESIA EVENT (OUTPATIENT)
Dept: PERIOP | Facility: HOSPITAL | Age: 50
End: 2020-08-05

## 2020-08-05 ENCOUNTER — ANESTHESIA (OUTPATIENT)
Dept: PERIOP | Facility: HOSPITAL | Age: 50
End: 2020-08-05

## 2020-08-05 PROCEDURE — 25010000002 MORPHINE PER 10 MG: Performed by: SURGERY

## 2020-08-05 PROCEDURE — 25010000002 PROPOFOL 10 MG/ML EMULSION: Performed by: NURSE ANESTHETIST, CERTIFIED REGISTERED

## 2020-08-05 PROCEDURE — 2W0PX6Z CHANGE PRESSURE DRESSING ON LEFT UPPER LEG: ICD-10-PCS | Performed by: SURGERY

## 2020-08-05 PROCEDURE — 25010000002 FENTANYL CITRATE (PF) 100 MCG/2ML SOLUTION: Performed by: NURSE ANESTHETIST, CERTIFIED REGISTERED

## 2020-08-05 PROCEDURE — 2W0NX6Z CHANGE PRESSURE DRESSING ON RIGHT UPPER LEG: ICD-10-PCS | Performed by: SURGERY

## 2020-08-05 PROCEDURE — 25010000002 MIDAZOLAM PER 1 MG: Performed by: NURSE ANESTHETIST, CERTIFIED REGISTERED

## 2020-08-05 PROCEDURE — 2W0CX6Z CHANGE PRESSURE DRESSING ON RIGHT LOWER ARM: ICD-10-PCS | Performed by: SURGERY

## 2020-08-05 RX ORDER — FENTANYL CITRATE 50 UG/ML
50 INJECTION, SOLUTION INTRAMUSCULAR; INTRAVENOUS
Status: DISCONTINUED | OUTPATIENT
Start: 2020-08-05 | End: 2020-08-05 | Stop reason: SDUPTHER

## 2020-08-05 RX ORDER — HYDROCODONE BITARTRATE AND ACETAMINOPHEN 7.5; 325 MG/1; MG/1
1 TABLET ORAL ONCE AS NEEDED
Status: DISCONTINUED | OUTPATIENT
Start: 2020-08-05 | End: 2020-08-05 | Stop reason: HOSPADM

## 2020-08-05 RX ORDER — SODIUM CHLORIDE 0.9 % (FLUSH) 0.9 %
3 SYRINGE (ML) INJECTION EVERY 12 HOURS SCHEDULED
Status: DISCONTINUED | OUTPATIENT
Start: 2020-08-05 | End: 2020-08-05 | Stop reason: HOSPADM

## 2020-08-05 RX ORDER — SODIUM CHLORIDE, SODIUM LACTATE, POTASSIUM CHLORIDE, CALCIUM CHLORIDE 600; 310; 30; 20 MG/100ML; MG/100ML; MG/100ML; MG/100ML
9 INJECTION, SOLUTION INTRAVENOUS CONTINUOUS
Status: DISCONTINUED | OUTPATIENT
Start: 2020-08-05 | End: 2020-08-06 | Stop reason: HOSPADM

## 2020-08-05 RX ORDER — SODIUM CHLORIDE 0.9 % (FLUSH) 0.9 %
3-10 SYRINGE (ML) INJECTION AS NEEDED
Status: DISCONTINUED | OUTPATIENT
Start: 2020-08-05 | End: 2020-08-05 | Stop reason: HOSPADM

## 2020-08-05 RX ORDER — OXYCODONE AND ACETAMINOPHEN 7.5; 325 MG/1; MG/1
1 TABLET ORAL ONCE AS NEEDED
Status: DISCONTINUED | OUTPATIENT
Start: 2020-08-05 | End: 2020-08-05 | Stop reason: HOSPADM

## 2020-08-05 RX ORDER — FENTANYL CITRATE 50 UG/ML
INJECTION, SOLUTION INTRAMUSCULAR; INTRAVENOUS AS NEEDED
Status: DISCONTINUED | OUTPATIENT
Start: 2020-08-05 | End: 2020-08-05 | Stop reason: SURG

## 2020-08-05 RX ORDER — SODIUM CHLORIDE, SODIUM LACTATE, POTASSIUM CHLORIDE, CALCIUM CHLORIDE 600; 310; 30; 20 MG/100ML; MG/100ML; MG/100ML; MG/100ML
9 INJECTION, SOLUTION INTRAVENOUS CONTINUOUS
Status: DISCONTINUED | OUTPATIENT
Start: 2020-08-05 | End: 2020-08-05 | Stop reason: SDUPTHER

## 2020-08-05 RX ORDER — FENTANYL CITRATE 50 UG/ML
50 INJECTION, SOLUTION INTRAMUSCULAR; INTRAVENOUS
Status: DISCONTINUED | OUTPATIENT
Start: 2020-08-05 | End: 2020-08-05 | Stop reason: HOSPADM

## 2020-08-05 RX ORDER — OXYCODONE HYDROCHLORIDE AND ACETAMINOPHEN 5; 325 MG/1; MG/1
2 TABLET ORAL EVERY 8 HOURS PRN
Qty: 30 TABLET | Refills: 0 | Status: SHIPPED | OUTPATIENT
Start: 2020-08-05 | End: 2020-08-07

## 2020-08-05 RX ORDER — LIDOCAINE HYDROCHLORIDE 10 MG/ML
0.5 INJECTION, SOLUTION EPIDURAL; INFILTRATION; INTRACAUDAL; PERINEURAL ONCE AS NEEDED
Status: DISCONTINUED | OUTPATIENT
Start: 2020-08-05 | End: 2020-08-05 | Stop reason: HOSPADM

## 2020-08-05 RX ORDER — PROPOFOL 10 MG/ML
VIAL (ML) INTRAVENOUS CONTINUOUS PRN
Status: DISCONTINUED | OUTPATIENT
Start: 2020-08-05 | End: 2020-08-05 | Stop reason: SURG

## 2020-08-05 RX ORDER — FAMOTIDINE 10 MG/ML
20 INJECTION, SOLUTION INTRAVENOUS ONCE
Status: COMPLETED | OUTPATIENT
Start: 2020-08-05 | End: 2020-08-05

## 2020-08-05 RX ORDER — BUPIVACAINE HYDROCHLORIDE AND EPINEPHRINE 2.5; 5 MG/ML; UG/ML
INJECTION, SOLUTION EPIDURAL; INFILTRATION; INTRACAUDAL; PERINEURAL AS NEEDED
Status: DISCONTINUED | OUTPATIENT
Start: 2020-08-05 | End: 2020-08-05 | Stop reason: HOSPADM

## 2020-08-05 RX ORDER — MAGNESIUM HYDROXIDE 1200 MG/15ML
LIQUID ORAL AS NEEDED
Status: DISCONTINUED | OUTPATIENT
Start: 2020-08-05 | End: 2020-08-05 | Stop reason: HOSPADM

## 2020-08-05 RX ORDER — FAMOTIDINE 10 MG/ML
20 INJECTION, SOLUTION INTRAVENOUS ONCE
Status: DISCONTINUED | OUTPATIENT
Start: 2020-08-05 | End: 2020-08-05 | Stop reason: SDUPTHER

## 2020-08-05 RX ORDER — HYDRALAZINE HYDROCHLORIDE 20 MG/ML
5 INJECTION INTRAMUSCULAR; INTRAVENOUS
Status: DISCONTINUED | OUTPATIENT
Start: 2020-08-05 | End: 2020-08-05 | Stop reason: HOSPADM

## 2020-08-05 RX ORDER — NALOXONE HCL 0.4 MG/ML
0.2 VIAL (ML) INJECTION AS NEEDED
Status: DISCONTINUED | OUTPATIENT
Start: 2020-08-05 | End: 2020-08-05 | Stop reason: HOSPADM

## 2020-08-05 RX ORDER — ONDANSETRON 2 MG/ML
4 INJECTION INTRAMUSCULAR; INTRAVENOUS ONCE AS NEEDED
Status: DISCONTINUED | OUTPATIENT
Start: 2020-08-05 | End: 2020-08-05 | Stop reason: HOSPADM

## 2020-08-05 RX ORDER — HYDROMORPHONE HYDROCHLORIDE 1 MG/ML
0.5 INJECTION, SOLUTION INTRAMUSCULAR; INTRAVENOUS; SUBCUTANEOUS
Status: DISCONTINUED | OUTPATIENT
Start: 2020-08-05 | End: 2020-08-05 | Stop reason: HOSPADM

## 2020-08-05 RX ORDER — MIDAZOLAM HYDROCHLORIDE 1 MG/ML
INJECTION INTRAMUSCULAR; INTRAVENOUS AS NEEDED
Status: DISCONTINUED | OUTPATIENT
Start: 2020-08-05 | End: 2020-08-05 | Stop reason: SURG

## 2020-08-05 RX ORDER — MIDAZOLAM HYDROCHLORIDE 1 MG/ML
1 INJECTION INTRAMUSCULAR; INTRAVENOUS
Status: DISCONTINUED | OUTPATIENT
Start: 2020-08-05 | End: 2020-08-05 | Stop reason: SDUPTHER

## 2020-08-05 RX ORDER — EPHEDRINE SULFATE 50 MG/ML
5 INJECTION, SOLUTION INTRAVENOUS ONCE AS NEEDED
Status: DISCONTINUED | OUTPATIENT
Start: 2020-08-05 | End: 2020-08-05 | Stop reason: HOSPADM

## 2020-08-05 RX ORDER — ACETAMINOPHEN 325 MG/1
650 TABLET ORAL ONCE AS NEEDED
Status: DISCONTINUED | OUTPATIENT
Start: 2020-08-05 | End: 2020-08-05 | Stop reason: HOSPADM

## 2020-08-05 RX ORDER — MIDAZOLAM HYDROCHLORIDE 1 MG/ML
1 INJECTION INTRAMUSCULAR; INTRAVENOUS
Status: DISCONTINUED | OUTPATIENT
Start: 2020-08-05 | End: 2020-08-05 | Stop reason: HOSPADM

## 2020-08-05 RX ORDER — FLUMAZENIL 0.1 MG/ML
0.2 INJECTION INTRAVENOUS AS NEEDED
Status: DISCONTINUED | OUTPATIENT
Start: 2020-08-05 | End: 2020-08-05 | Stop reason: HOSPADM

## 2020-08-05 RX ADMIN — LEVETIRACETAM 1000 MG: 500 TABLET, FILM COATED ORAL at 22:06

## 2020-08-05 RX ADMIN — FENTANYL CITRATE 50 MCG: 50 INJECTION, SOLUTION INTRAMUSCULAR; INTRAVENOUS at 16:55

## 2020-08-05 RX ADMIN — FENTANYL CITRATE 50 MCG: 50 INJECTION, SOLUTION INTRAMUSCULAR; INTRAVENOUS at 17:11

## 2020-08-05 RX ADMIN — FENTANYL CITRATE 50 MCG: 50 INJECTION INTRAMUSCULAR; INTRAVENOUS at 15:12

## 2020-08-05 RX ADMIN — DULOXETINE HYDROCHLORIDE 30 MG: 30 CAPSULE, DELAYED RELEASE ORAL at 10:20

## 2020-08-05 RX ADMIN — NICOTINE 1 PATCH: 14 PATCH TRANSDERMAL at 10:22

## 2020-08-05 RX ADMIN — OXYCODONE HYDROCHLORIDE AND ACETAMINOPHEN 2 TABLET: 5; 325 TABLET ORAL at 19:59

## 2020-08-05 RX ADMIN — LEVETIRACETAM 1000 MG: 500 TABLET, FILM COATED ORAL at 10:21

## 2020-08-05 RX ADMIN — OXYCODONE HYDROCHLORIDE AND ACETAMINOPHEN 2 TABLET: 5; 325 TABLET ORAL at 13:01

## 2020-08-05 RX ADMIN — SODIUM CHLORIDE, POTASSIUM CHLORIDE, SODIUM LACTATE AND CALCIUM CHLORIDE 9 ML/HR: 600; 310; 30; 20 INJECTION, SOLUTION INTRAVENOUS at 14:52

## 2020-08-05 RX ADMIN — PROPOFOL 100 MCG/KG/MIN: 10 INJECTION, EMULSION INTRAVENOUS at 15:16

## 2020-08-05 RX ADMIN — MIDAZOLAM 2 MG: 1 INJECTION INTRAMUSCULAR; INTRAVENOUS at 15:12

## 2020-08-05 RX ADMIN — MORPHINE SULFATE 2 MG: 2 INJECTION, SOLUTION INTRAMUSCULAR; INTRAVENOUS at 00:41

## 2020-08-05 RX ADMIN — FAMOTIDINE 20 MG: 10 INJECTION INTRAVENOUS at 14:52

## 2020-08-05 RX ADMIN — LORAZEPAM 2 MG: 1 TABLET ORAL at 22:16

## 2020-08-05 RX ADMIN — SODIUM CHLORIDE, PRESERVATIVE FREE 10 ML: 5 INJECTION INTRAVENOUS at 22:17

## 2020-08-05 RX ADMIN — FENTANYL CITRATE 50 MCG: 50 INJECTION INTRAMUSCULAR; INTRAVENOUS at 15:24

## 2020-08-05 RX ADMIN — OXYCODONE HYDROCHLORIDE AND ACETAMINOPHEN 2 TABLET: 5; 325 TABLET ORAL at 04:27

## 2020-08-05 RX ADMIN — LORAZEPAM 2 MG: 1 TABLET ORAL at 10:28
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
## 2020-08-05 NOTE — PLAN OF CARE
VSS. IV saline locked. Fall precautions maintained. Up to bathroom with assist. Voiding freely. Tolerating regular diet. Taking percocet and morphine for pain. Dressings CD&I to BLE and RUE. Plan for dsg change in OR tomorrow. NPO after midnight. CIWA scoring completed. Contact precautions maintained. Possible D/C post-  dressing change
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
## 2020-08-05 NOTE — PLAN OF CARE
Dressing change in OR for today. VSS. IV saline locked. Fall precautions maintained. Up to bathroom with assist. Voiding freely. Tolerating regular diet. Taking percocet and morphine for pain. Dressings CD&I to BLE and RUE. NPO since midnight. CIWA scoring completed. Contact precautions maintained. Possible D/C post-  dressing change     Naa Torres RN
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
## 2020-08-05 NOTE — PAYOR COMM NOTE
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 to reduce systolic blood pressure to less than 180 mmHg or diastolic blood pressure less than 105 mmHg    Maximum dose of hydralazine is 20 mg  Caution: Look alike/sound alike drug alert       HYDROcodone-acetaminophen (NORCO) 7.5-325 MG per tablet 1 tablet   Dose: 1 tablet  Freq: Once As Needed Route: PO  PRN Reason: Mild Pain   Start: 07/30/20 1650 End: 07/30/20 1833       HYDROcodone-acetaminophen (NORCO) 7.5-325 MG per tablet 1 tablet   Dose: 1 tablet  Freq: Once As Needed Route: PO  PRN Reason: Mild Pain   Start: 07/26/20 1304 End: 07/26/20 1414       HYDROmorphone (DILAUDID) injection 0.5 mg   Dose: 0.5 mg  Freq: Every 5 Minutes PRN Route: IV  PRN Reasons: Moderate Pain ,Severe Pain   Start: 07/30/20 1650 End: 07/30/20 1833    Admin Instructions:   May alternate fentanyl with hydromorphone using fentanyl first.    Maximum total dose of hydromorphone is 2 mg.  If given for pain, use the following pain scale:  Mild Pain = Pain Score of 1-3, CPOT 1-2  Moderate Pain = Pain Score of 4-6, CPOT 3-4  Severe Pain = Pain Score of 7-10, CPOT 5-8       HYDROmorphone (DILAUDID) injection 0.5  mg   Dose: 0.5 mg  Freq: Every 5 Minutes PRN Route: IV  PRN Reasons: Moderate Pain ,Severe Pain   Start: 07/26/20 1304 End: 07/26/20 1414    Admin Instructions:   May alternate fentanyl with hydromorphone using fentanyl first.    Maximum total dose of hydromorphone is 2 mg.  If given for pain, use the following pain scale:  Mild Pain = Pain Score of 1-3, CPOT 1-2  Moderate Pain = Pain Score of 4-6, CPOT 3-4  Severe Pain = Pain Score of 7-10, CPOT 5-8       hydrOXYzine (ATARAX) tablet 25 mg   Dose: 25 mg  Freq: 4 Times Daily PRN Route: PO  PRN Reason: Anxiety  Start: 07/29/20 1630 End: 07/30/20 1249    Admin Instructions:   Caution: Look alike/sound alike drug alert       hydrOXYzine pamoate (VISTARIL) capsule 50 mg   Dose: 50 mg  Freq: Every 6 Hours PRN Route: PO  PRN Reason: Anxiety  Start: 07/30/20 1103    Admin Instructions:   Caution: Look alike/sound alike drug alert       ketamine (KETALAR) injection   Freq: As Needed  Start: 07/30/20 1448 End: 07/30/20 1704       ketamine (KETALAR) injection   Freq: As Needed  Start: 07/26/20 1222 End: 07/26/20 1311       labetalol (NORMODYNE,TRANDATE) injection 5 mg   Dose: 5 mg  Freq: Every 5 Minutes PRN Route: IV  PRN Reason: High Blood Pressure  PRN Comment: for systolic blood pressure greater than 180 mmHg or diastolic blood pressure greater than 105 mmHg  Start: 07/30/20 1650 End: 07/30/20 1833    Admin Instructions:   Use labetalol first THEN hydralazine second if labetalol fails to reduce systolic blood pressure to less than 180 mmHg or diastolic blood pressure less than 105 mmHg    Hold for heart rate less than 60.    Maximum dose of labetalol is 20 mg  Give by slow IV Push each 20mg (or less) over 2 minutes       labetalol (NORMODYNE,TRANDATE) injection 5 mg   Dose: 5 mg  Freq: Every 5 Minutes PRN Route: IV  PRN Reason: High Blood Pressure  PRN Comment: for systolic blood pressure greater than 180 mmHg or diastolic blood pressure greater than 105 mmHg  Start:  07/26/20 1304 End: 07/26/20 1414    Admin Instructions:   Use labetalol first THEN hydralazine second if labetalol fails to reduce systolic blood pressure to less than 180 mmHg or diastolic blood pressure less than 105 mmHg    Hold for heart rate less than 60.    Maximum dose of labetalol is 20 mg  Give by slow IV Push each 20mg (or less) over 2 minutes       lidocaine (XYLOCAINE) 2% injection   Freq: As Needed Route: IJ  Start: 07/30/20 1419 End: 07/30/20 1704       lidocaine PF 1% (XYLOCAINE) injection 0.5 mL   Dose: 0.5 mL  Freq: Once As Needed Route: IJ  PRN Comment: IV Start  Start: 07/30/20 1325 End: 07/30/20 1700       lidocaine PF 1% (XYLOCAINE) injection 0.5 mL   Dose: 0.5 mL  Freq: Once As Needed Route: IJ  PRN Comment: IV Start  Start: 07/26/20 1045 End: 07/26/20 1313       LORazepam (ATIVAN) tablet 2 mg   Dose: 2 mg  Freq: Every 6 Hours PRN Route: PO  PRN Reason: Withdrawal  PRN Comment: Give for diastolic blood pressure greater than 95 and/or heart rate greater than 95.  Do not give for subjective anxiety.  Start: 07/30/20 1102 End: 08/09/20 1101    Admin Instructions:    Caution: Look alike/sound alike drug alert    1028                          midazolam (VERSED) injection   Freq: As Needed Route: IV  Start: 07/26/20 1145 End: 07/26/20 1311       midazolam (VERSED) injection 1 mg   Dose: 1 mg  Freq: Every 10 Minutes PRN Route: IV  PRN Reason: Anxiety  Indications of Use: ANXIETY  Start: 07/30/20 1325 End: 07/30/20 1700    Admin Instructions:   May repeat dose in 10 minutes x 1 then contact provider for additional orders.         midazolam (VERSED) injection 1 mg   Dose: 1 mg  Freq: Every 10 Minutes PRN Route: IV  PRN Reason: Anxiety  Indications of Use: ANXIETY  Start: 07/26/20 1045 End: 07/26/20 1313    Admin Instructions:   May repeat dose in 10 minutes x 1 then contact provider for additional orders.         mineral oil liquid   Freq: As Needed  Start: 07/30/20 1410 End: 07/30/20 1658          morphine injection 2 mg   Dose: 2 mg  Freq: Every 4 Hours PRN Route: IV  PRN Reason: Severe Pain   Start: 07/29/20 0835 End: 08/05/20 0834    Admin Instructions:   If given for pain, use the following pain scale:  Mild Pain = Pain Score of 1-3, CPOT 1-2  Moderate Pain = Pain Score of 4-6, CPOT 3-4  Severe Pain = Pain Score of 7-10, CPOT 5-8    0041   0834-D/C'd                      naloxone (NARCAN) injection 0.2 mg   Dose: 0.2 mg  Freq: As Needed Route: IV  PRN Reasons: Opioid Reversal,Respiratory Depression  PRN Comment: unresponsiveness, decrease oxygen saturation  Indications of Use: ACUTE RESPIRATORY FAILURE,OPIOID-INDUCED RESPIRATORY DEPRESSION  Start: 07/30/20 1650 End: 07/30/20 1833    Admin Instructions:   Notify Anesthesia if given       naloxone (NARCAN) injection 0.2 mg   Dose: 0.2 mg  Freq: As Needed Route: IV  PRN Reasons: Opioid Reversal,Respiratory Depression  PRN Comment: unresponsiveness, decrease oxygen saturation  Indications of Use: ACUTE RESPIRATORY FAILURE,OPIOID-INDUCED RESPIRATORY DEPRESSION  Start: 07/26/20 1304 End: 07/26/20 1414    Admin Instructions:   Notify Anesthesia if given       neostigmine (PROSTIGMINE) injection   Freq: As Needed Route: IV  Start: 07/30/20 1551 End: 07/30/20 1704       ondansetron (ZOFRAN) injection   Freq: As Needed Route: IV  Start: 07/30/20 1633 End: 07/30/20 1704       ondansetron (ZOFRAN) injection   Freq: As Needed  Start: 07/26/20 1252 End: 07/26/20 1311       ondansetron (ZOFRAN) injection 4 mg   Dose: 4 mg  Freq: Once As Needed Route: IV  PRN Reasons: Nausea,Vomiting  Indications of Use: POSTOPERATIVE NAUSEA AND VOMITING  Start: 07/30/20 1650 End: 07/30/20 1833    Admin Instructions:   If BOTH ondansetron (ZOFRAN) and promethazine (PHENERGAN) are ordered use ondansetron first and THEN promethazine IF ondansetron is ineffective.       ondansetron (ZOFRAN) injection 4 mg   Dose: 4 mg  Freq: Once As Needed Route: IV  PRN Reasons:  Nausea,Vomiting  Indications of Use: POSTOPERATIVE NAUSEA AND VOMITING  Start: 07/26/20 1304 End: 07/26/20 1414    Admin Instructions:   If BOTH ondansetron (ZOFRAN) and promethazine (PHENERGAN) are ordered use ondansetron first and THEN promethazine IF ondansetron is ineffective.       ondansetron (ZOFRAN) tablet 4 mg   Dose: 4 mg  Freq: Every 6 Hours PRN Route: PO  PRN Reasons: Nausea,Vomiting  Start: 07/24/20 1415    Admin Instructions:   If BOTH ondansetron (ZOFRAN) and promethazine (PHENERGAN) are ordered use ondansetron first and THEN promethazine IF ondansetron is ineffective.       Or  ondansetron (ZOFRAN) injection 4 mg   Dose: 4 mg  Freq: Every 6 Hours PRN Route: IV  PRN Reasons: Nausea,Vomiting  Start: 07/24/20 1415    Admin Instructions:   If BOTH ondansetron (ZOFRAN) and promethazine (PHENERGAN) are ordered use ondansetron first and THEN promethazine IF ondansetron is ineffective.       oxyCODONE-acetaminophen (PERCOCET) 5-325 MG per tablet 1 tablet   Dose: 1 tablet  Freq: Every 8 Hours PRN Route: PO  PRN Reason: Moderate Pain   Start: 07/25/20 1416 End: 07/26/20 1608    Admin Instructions:   [EVARISTO]    Do not exceed 4 grams of acetaminophen in a 24 hr period.    If given for pain, use the following pain scale:   Mild Pain = Pain Score of 1-3, CPOT 1-2  Moderate Pain = Pain Score of 4-6, CPOT 3-4  Severe Pain = Pain Score of 7-10, CPOT 5-8       Followed by  oxyCODONE-acetaminophen (PERCOCET) 5-325 MG per tablet 2 tablet   Dose: 2 tablet  Freq: Every 8 Hours PRN Route: PO  PRN Reasons: Moderate Pain ,Severe Pain   Start: 08/04/20 1416 End: 07/26/20 1608    Admin Instructions:   [EVARISTO]    Do not exceed 4 grams of acetaminophen in a 24 hr period.    If given for pain, use the following pain scale:   Mild Pain = Pain Score of 1-3, CPOT 1-2  Moderate Pain = Pain Score of 4-6, CPOT 3-4  Severe Pain = Pain Score of 7-10, CPOT 5-8       oxyCODONE-acetaminophen (PERCOCET) 5-325 MG per tablet 2 tablet   Dose: 2  tablet  Freq: Every 8 Hours PRN Route: PO  PRN Reason: Severe Pain   Start: 07/28/20 1626 End: 08/07/20 1625    Admin Instructions:   [EVARISTO]    Do not exceed 4 grams of acetaminophen in a 24 hr period.    If given for pain, use the following pain scale:   Mild Pain = Pain Score of 1-3, CPOT 1-2  Moderate Pain = Pain Score of 4-6, CPOT 3-4  Severe Pain = Pain Score of 7-10, CPOT 5-8    0427   1301                        oxyCODONE-acetaminophen (PERCOCET) 5-325 MG per tablet 2 tablet   Dose: 2 tablet  Freq: Every 8 Hours PRN Route: PO  PRN Reason: Severe Pain   Start: 07/25/20 1319 End: 07/25/20 1416    Admin Instructions:   [EVARISTO]    Do not exceed 4 grams of acetaminophen in a 24 hr period.    If given for pain, use the following pain scale:   Mild Pain = Pain Score of 1-3, CPOT 1-2  Moderate Pain = Pain Score of 4-6, CPOT 3-4  Severe Pain = Pain Score of 7-10, CPOT 5-8       oxyCODONE-acetaminophen (PERCOCET) 7.5-325 MG per tablet 1 tablet   Dose: 1 tablet  Freq: Once As Needed Route: PO  PRN Reason: Moderate Pain   Start: 07/30/20 1650 End: 07/30/20 1833       oxyCODONE-acetaminophen (PERCOCET) 7.5-325 MG per tablet 1 tablet   Dose: 1 tablet  Freq: Once As Needed Route: PO  PRN Reason: Moderate Pain   Start: 07/27/20 0349 End: 07/27/20 0358    Admin Instructions:   [EVARISTO]    Do not exceed 4 grams of acetaminophen in a 24 hr period.    If given for pain, use the following pain scale:   Mild Pain = Pain Score of 1-3, CPOT 1-2  Moderate Pain = Pain Score of 4-6, CPOT 3-4  Severe Pain = Pain Score of 7-10, CPOT 5-8       oxyCODONE-acetaminophen (PERCOCET) 7.5-325 MG per tablet 1 tablet   Dose: 1 tablet  Freq: Every 8 Hours PRN Route: PO  PRN Reason: Moderate Pain   Start: 07/26/20 2217 End: 07/28/20 1626    Admin Instructions:   [EVARISTO]    Do not exceed 4 grams of acetaminophen in a 24 hr period.    If given for pain, use the following pain scale:   Mild Pain = Pain Score of 1-3, CPOT 1-2  Moderate Pain = Pain Score of 4-6,  CPOT 3-4  Severe Pain = Pain Score of 7-10, CPOT 5-8       oxyCODONE-acetaminophen (PERCOCET) 7.5-325 MG per tablet 1 tablet   Dose: 1 tablet  Freq: Once As Needed Route: PO  PRN Reason: Moderate Pain   Start: 07/26/20 1304 End: 07/26/20 1338       Pharmacy to dose vancomycin   Freq: Continuous PRN Route: XX  PRN Reason: Consult  Indications of Use: SKIN AND SOFT TISSUE INFECTION  Start: 07/25/20 0000 End: 07/27/20 0834       phenylephrine (BETHANY-SYNEPHRINE) injection   Freq: As Needed Route: IV  Start: 07/30/20 1429 End: 07/30/20 1704       potassium chloride (KLOR-CON) packet 40 mEq   Dose: 40 mEq  Freq: As Needed Route: PO  PRN Comment: Potassium Replacement, See Admin Instructions  Start: 07/25/20 1123    Admin Instructions:   Potassium 3.1 or Less Give KCl 40 mEq q4h x3 Doses   Potassium 3.2 - 3.6 Give KCl 40 mEq q4h x2 Doses     Check Potassium 4 Hours After Last Dose Given   Check Magnesium if Potassium Level Remains Low After Replacement   DO NOT GIVE if CrCl is Less Than 30 mL/minute or Urine Output Less Than 30 mL/hr       potassium chloride (MICRO-K) CR capsule 40 mEq   Dose: 40 mEq  Freq: As Needed Route: PO  PRN Comment: Potassium Replacement.  See Admin Instructions  Start: 07/25/20 1123    Admin Instructions:   Potassium 3.1 or Less Give KCl 40 mEq q4h x3 Doses   Potassium 3.2 - 3.6 Give KCl 40 mEq q4h x2 Doses     Check Potassium 4 Hours After Last Dose Given   Check Magnesium if Potassium Level Remains Low After Replacement   DO NOT GIVE if CrCl is Less Than 30 mL/minute or Urine Output Less Than 30 mL/hr       promethazine (PHENERGAN) injection 6.25 mg   Dose: 6.25 mg  Freq: Every 10 Minutes PRN Route: IV  PRN Reasons: Nausea,Vomiting  Start: 07/30/20 1650 End: 07/30/20 1833    Admin Instructions:   If BOTH ondansetron (ZOFRAN) and promethazine (PHENERGAN) are ordered use ondansetron first and THEN promethazine IF ondansetron is ineffective.    Maximum total dose of IV phenergan is 12.5mg  IF GIVING  IV ONLY, dilute dose in 20 mL NS and infuse over 10 minutes. Administer through large bore vein (not hand or wrist) through running IV line at port furthest from patient's vein.       Or  promethazine (PHENERGAN) injection 12.5 mg   Dose: 12.5 mg  Freq: Once As Needed Route: IM  PRN Reasons: Nausea,Vomiting  Start: 07/30/20 1650 End: 07/30/20 1833    Admin Instructions:   If BOTH ondansetron (ZOFRAN) and promethazine (PHENERGAN) are ordered use ondansetron first and THEN promethazine IF ondansetron is ineffective.  IF GIVING IV ONLY, dilute dose in 20 mL NS and infuse over 10 minutes. Administer through large bore vein (not hand or wrist) through running IV line at port furthest from patient's vein.       Or  promethazine (PHENERGAN) suppository 25 mg   Dose: 25 mg  Freq: Once As Needed Route: RE  PRN Reasons: Nausea,Vomiting  Start: 07/30/20 1650 End: 07/30/20 1833    Admin Instructions:   If BOTH ondansetron (ZOFRAN) and promethazine (PHENERGAN) are ordered use ondansetron first and THEN promethazine IF ondansetron is ineffective.       Or  promethazine (PHENERGAN) tablet 25 mg   Dose: 25 mg  Freq: Once As Needed Route: PO  PRN Reasons: Nausea,Vomiting  Start: 07/30/20 1650 End: 07/30/20 1833    Admin Instructions:   If BOTH ondansetron (ZOFRAN) and promethazine (PHENERGAN) are ordered use ondansetron first and THEN promethazine IF ondansetron is ineffective.         promethazine (PHENERGAN) injection 6.25 mg   Dose: 6.25 mg  Freq: Every 10 Minutes PRN Route: IV  PRN Reasons: Nausea,Vomiting  Start: 07/26/20 1304 End: 07/26/20 1414    Admin Instructions:   If BOTH ondansetron (ZOFRAN) and promethazine (PHENERGAN) are ordered use ondansetron first and THEN promethazine IF ondansetron is ineffective.    Maximum total dose of IV phenergan is 12.5mg  IF GIVING IV ONLY, dilute dose in 20 mL NS and infuse over 10 minutes. Administer through large bore vein (not hand or wrist) through running IV line at port furthest  from patient's vein.       Or  promethazine (PHENERGAN) injection 12.5 mg   Dose: 12.5 mg  Freq: Once As Needed Route: IM  PRN Reasons: Nausea,Vomiting  Start: 07/26/20 1304 End: 07/26/20 1414    Admin Instructions:   If BOTH ondansetron (ZOFRAN) and promethazine (PHENERGAN) are ordered use ondansetron first and THEN promethazine IF ondansetron is ineffective.  IF GIVING IV ONLY, dilute dose in 20 mL NS and infuse over 10 minutes. Administer through large bore vein (not hand or wrist) through running IV line at port furthest from patient's vein.       Or  promethazine (PHENERGAN) suppository 25 mg   Dose: 25 mg  Freq: Once As Needed Route: RE  PRN Reasons: Nausea,Vomiting  Start: 07/26/20 1304 End: 07/26/20 1414    Admin Instructions:   If BOTH ondansetron (ZOFRAN) and promethazine (PHENERGAN) are ordered use ondansetron first and THEN promethazine IF ondansetron is ineffective.       Or  promethazine (PHENERGAN) tablet 25 mg   Dose: 25 mg  Freq: Once As Needed Route: PO  PRN Reasons: Nausea,Vomiting  Start: 07/26/20 1304 End: 07/26/20 1414    Admin Instructions:   If BOTH ondansetron (ZOFRAN) and promethazine (PHENERGAN) are ordered use ondansetron first and THEN promethazine IF ondansetron is ineffective.         Propofol (DIPRIVAN) injection   Freq: As Needed Route: IV  Start: 07/30/20 1419 End: 07/30/20 1704       Propofol (DIPRIVAN) injection   Freq: As Needed Route: IV  Start: 07/26/20 1148 End: 07/26/20 1311       rocuronium (ZEMURON) injection   Freq: As Needed Route: IV  Start: 07/30/20 1419 End: 07/30/20 1704       silver sulfadiazine (SILVADENE, SSD) 1 % cream   Freq: As Needed  Start: 07/26/20 1238 End: 07/26/20 1305       sodium chloride (NS) irrigation solution   Freq: As Needed  Start: 07/30/20 1410 End: 07/30/20 1658       sodium chloride (NS) irrigation solution   Freq: As Needed  Start: 07/26/20 1128 End: 07/26/20 1305       sodium chloride 0.9 % flush 10 mL   Dose: 10 mL  Freq: As Needed Route:  IV  PRN Reason: Line Care  Start: 07/24/20 1415       sodium chloride 0.9 % flush 3-10 mL   Dose: 3-10 mL  Freq: As Needed Route: IV  PRN Reason: Line Care  Start: 07/30/20 1325 End: 07/30/20 1700       sodium chloride 0.9 % flush 3-10 mL   Dose: 3-10 mL  Freq: As Needed Route: IV  PRN Reason: Line Care  Start: 07/26/20 1045 End: 07/26/20 1313       sodium chloride 100 mL, bupivacaine-EPINEPHrine PF 60 mL mixture   Freq: As Needed  Start: 07/30/20 1410 End: 07/30/20 1658       sodium chloride 100 mL, lidocaine-EPINEPHrine 60 mL mixture   Freq: As Needed  Start: 07/30/20 1410 End: 07/30/20 1658       traMADol (ULTRAM) tablet 50 mg   Dose: 50 mg  Freq: Every 6 Hours PRN Route: PO  PRN Reason: Moderate Pain   Start: 07/26/20 1607 End: 07/26/20 2216    Admin Instructions:       Caution: Look alike/sound alike drug alert    If given for pain, use the following pain scale:  Mild Pain = Pain Score of 1-3, CPOT 1-2  Moderate Pain = Pain Score of 4-6, CPOT 3-4  Severe Pain = Pain Score of 7-10, CPOT 5-8       traMADol (ULTRAM) tablet 50 mg   Dose: 50 mg  Freq: Every 6 Hours PRN Route: PO  PRN Reason: Moderate Pain   Start: 07/24/20 1415 End: 07/25/20 1319    Admin Instructions:       Caution: Look alike/sound alike drug alert    If given for pain, use the following pain scale:  Mild Pain = Pain Score of 1-3, CPOT 1-2  Moderate Pain = Pain Score of 4-6, CPOT 3-4  Severe Pain = Pain Score of 7-10, CPOT 5-8       Medications 08/05/20           Consult Notes (last 24 hours) (Notes from 08/04/20 1337 through 08/05/20 1337)    No notes of this type exist for this encounter.

## 2020-08-06 VITALS
TEMPERATURE: 96.6 F | DIASTOLIC BLOOD PRESSURE: 76 MMHG | HEIGHT: 66 IN | BODY MASS INDEX: 25.02 KG/M2 | WEIGHT: 155.7 LBS | SYSTOLIC BLOOD PRESSURE: 106 MMHG | HEART RATE: 95 BPM | OXYGEN SATURATION: 96 % | RESPIRATION RATE: 16 BRPM

## 2020-08-06 RX ADMIN — OXYCODONE HYDROCHLORIDE AND ACETAMINOPHEN 2 TABLET: 5; 325 TABLET ORAL at 12:31

## 2020-08-06 RX ADMIN — HYDROXYZINE PAMOATE 50 MG: 50 CAPSULE ORAL at 08:13

## 2020-08-06 RX ADMIN — OXYCODONE HYDROCHLORIDE AND ACETAMINOPHEN 2 TABLET: 5; 325 TABLET ORAL at 04:37

## 2020-08-06 RX ADMIN — NICOTINE 1 PATCH: 14 PATCH TRANSDERMAL at 08:08

## 2020-08-06 RX ADMIN — DULOXETINE HYDROCHLORIDE 30 MG: 30 CAPSULE, DELAYED RELEASE ORAL at 08:07

## 2020-08-06 RX ADMIN — LEVETIRACETAM 1000 MG: 500 TABLET, FILM COATED ORAL at 08:07

## 2020-08-06 RX ADMIN — FERROUS SULFATE TAB 325 MG (65 MG ELEMENTAL FE) 325 MG: 325 (65 FE) TAB at 08:08

## 2020-08-06 NOTE — PLAN OF CARE
Problem: Patient Care Overview  Goal: Plan of Care Review  Outcome: Ongoing (interventions implemented as appropriate)  Flowsheets (Taken 8/6/2020 3199)  Progress: improving  Plan of Care Reviewed With: patient  Note:   RUE/ RLE and LLE dressing with gauze and ace wrap CDI.  Percocet given by previous rn with good relief. Slept good thru the night.
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
## 2020-08-06 NOTE — PROGRESS NOTES
Case Management Discharge Note      Final Note: Discharged home. Areli Montes De Oca, ALEXANDER              Transportation Services  Private: Car    Final Discharge Disposition Code: 01 - home or self-care

## 2020-08-07 ENCOUNTER — READMISSION MANAGEMENT (OUTPATIENT)
Dept: CALL CENTER | Facility: HOSPITAL | Age: 50
End: 2020-08-07
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
## 2020-08-07 NOTE — OUTREACH NOTE
Prep Survey      Responses   [facility] patient discharged from?  [place]   Is LACE score < 7 ?  No   Eligibility  Not Eligible   What are the reasons patient is not eligible?  Other   Does the patient have one of the following disease processes/diagnoses(primary or secondary)?  General Surgery   Prep survey completed?  Yes          Sharron York RN

## 2020-08-11 ENCOUNTER — APPOINTMENT (OUTPATIENT)
Dept: WOUND CARE | Facility: HOSPITAL | Age: 50
End: 2020-08-11

## 2020-08-17 ENCOUNTER — OFFICE VISIT (OUTPATIENT)
Dept: WOUND CARE | Facility: HOSPITAL | Age: 50
End: 2020-08-17

## 2020-08-17 PROCEDURE — G0463 HOSPITAL OUTPT CLINIC VISIT: HCPCS

## 2020-08-24 ENCOUNTER — OFFICE VISIT (OUTPATIENT)
Dept: WOUND CARE | Facility: HOSPITAL | Age: 50
End: 2020-08-24

## 2020-08-24 PROCEDURE — G0463 HOSPITAL OUTPT CLINIC VISIT: HCPCS

## 2020-09-21 ENCOUNTER — APPOINTMENT (OUTPATIENT)
Dept: WOUND CARE | Facility: HOSPITAL | Age: 50
End: 2020-09-21

## 2021-01-17 ENCOUNTER — APPOINTMENT (OUTPATIENT)
Dept: CT IMAGING | Facility: HOSPITAL | Age: 51
End: 2021-01-17

## 2021-01-17 ENCOUNTER — APPOINTMENT (OUTPATIENT)
Dept: GENERAL RADIOLOGY | Facility: HOSPITAL | Age: 51
End: 2021-01-17

## 2021-01-17 ENCOUNTER — HOSPITAL ENCOUNTER (INPATIENT)
Facility: HOSPITAL | Age: 51
LOS: 8 days | Discharge: NURSING, REHAB OR LONG-TERM CARE | End: 2021-01-25
Attending: EMERGENCY MEDICINE | Admitting: INTERNAL MEDICINE

## 2021-01-17 DIAGNOSIS — S42.201A CLOSED FRACTURE OF PROXIMAL END OF RIGHT HUMERUS, UNSPECIFIED FRACTURE MORPHOLOGY, INITIAL ENCOUNTER: ICD-10-CM

## 2021-01-17 DIAGNOSIS — G93.41 ACUTE METABOLIC ENCEPHALOPATHY: Primary | ICD-10-CM

## 2021-01-17 PROBLEM — S42.301A FRACTURE OF RIGHT HUMERUS: Status: ACTIVE | Noted: 2021-01-17

## 2021-01-17 PROBLEM — F23 ACUTE PSYCHOSIS (HCC): Status: ACTIVE | Noted: 2021-01-17

## 2021-01-17 PROBLEM — F11.90 OPIOID USE DISORDER: Status: ACTIVE | Noted: 2021-01-17

## 2021-01-17 PROBLEM — F19.10 POLYSUBSTANCE ABUSE (HCC): Status: ACTIVE | Noted: 2021-01-17

## 2021-01-17 LAB
ALBUMIN SERPL-MCNC: 3 G/DL (ref 3.5–5.2)
ALBUMIN/GLOB SERPL: 1.1 G/DL
ALP SERPL-CCNC: 164 U/L (ref 39–117)
ALT SERPL W P-5'-P-CCNC: 17 U/L (ref 1–33)
AMPHET+METHAMPHET UR QL: NEGATIVE
ANION GAP SERPL CALCULATED.3IONS-SCNC: 9 MMOL/L (ref 5–15)
APAP SERPL-MCNC: <5 MCG/ML (ref 0–30)
AST SERPL-CCNC: 31 U/L (ref 1–32)
BARBITURATES UR QL SCN: NEGATIVE
BASOPHILS # BLD AUTO: 0.03 10*3/MM3 (ref 0–0.2)
BASOPHILS NFR BLD AUTO: 0.3 % (ref 0–1.5)
BENZODIAZ UR QL SCN: NEGATIVE
BILIRUB SERPL-MCNC: 0.9 MG/DL (ref 0–1.2)
BILIRUB UR QL STRIP: NEGATIVE
BUN SERPL-MCNC: 19 MG/DL (ref 6–20)
BUN/CREAT SERPL: 22.6 (ref 7–25)
CALCIUM SPEC-SCNC: 8.7 MG/DL (ref 8.6–10.5)
CANNABINOIDS SERPL QL: NEGATIVE
CHLORIDE SERPL-SCNC: 106 MMOL/L (ref 98–107)
CLARITY UR: CLEAR
CO2 SERPL-SCNC: 26 MMOL/L (ref 22–29)
COCAINE UR QL: NEGATIVE
COLOR UR: ABNORMAL
CREAT SERPL-MCNC: 0.84 MG/DL (ref 0.57–1)
DEPRECATED RDW RBC AUTO: 39.6 FL (ref 37–54)
EOSINOPHIL # BLD AUTO: 0 10*3/MM3 (ref 0–0.4)
EOSINOPHIL NFR BLD AUTO: 0 % (ref 0.3–6.2)
ERYTHROCYTE [DISTWIDTH] IN BLOOD BY AUTOMATED COUNT: 12.3 % (ref 12.3–15.4)
ETHANOL BLD-MCNC: <10 MG/DL (ref 0–10)
ETHANOL UR QL: <0.01 %
GFR SERPL CREATININE-BSD FRML MDRD: 71 ML/MIN/1.73
GLOBULIN UR ELPH-MCNC: 2.7 GM/DL
GLUCOSE BLDC GLUCOMTR-MCNC: 96 MG/DL (ref 70–130)
GLUCOSE SERPL-MCNC: 163 MG/DL (ref 65–99)
GLUCOSE UR STRIP-MCNC: NEGATIVE MG/DL
HCT VFR BLD AUTO: 36 % (ref 34–46.6)
HGB BLD-MCNC: 12.2 G/DL (ref 12–15.9)
HGB UR QL STRIP.AUTO: NEGATIVE
IMM GRANULOCYTES # BLD AUTO: 0.05 10*3/MM3 (ref 0–0.05)
IMM GRANULOCYTES NFR BLD AUTO: 0.5 % (ref 0–0.5)
KETONES UR QL STRIP: NEGATIVE
LEUKOCYTE ESTERASE UR QL STRIP.AUTO: NEGATIVE
LYMPHOCYTES # BLD AUTO: 0.46 10*3/MM3 (ref 0.7–3.1)
LYMPHOCYTES NFR BLD AUTO: 4.2 % (ref 19.6–45.3)
MAGNESIUM SERPL-MCNC: 1.8 MG/DL (ref 1.6–2.6)
MCH RBC QN AUTO: 30 PG (ref 26.6–33)
MCHC RBC AUTO-ENTMCNC: 33.9 G/DL (ref 31.5–35.7)
MCV RBC AUTO: 88.7 FL (ref 79–97)
METHADONE UR QL SCN: NEGATIVE
MONOCYTES # BLD AUTO: 0.6 10*3/MM3 (ref 0.1–0.9)
MONOCYTES NFR BLD AUTO: 5.5 % (ref 5–12)
NEUTROPHILS NFR BLD AUTO: 89.5 % (ref 42.7–76)
NEUTROPHILS NFR BLD AUTO: 9.81 10*3/MM3 (ref 1.7–7)
NITRITE UR QL STRIP: NEGATIVE
NRBC BLD AUTO-RTO: 0 /100 WBC (ref 0–0.2)
OPIATES UR QL: POSITIVE
OXYCODONE UR QL SCN: NEGATIVE
PH UR STRIP.AUTO: 6.5 [PH] (ref 5–8)
PLATELET # BLD AUTO: 273 10*3/MM3 (ref 140–450)
PMV BLD AUTO: 9.2 FL (ref 6–12)
POTASSIUM SERPL-SCNC: 3.4 MMOL/L (ref 3.5–5.2)
PROT SERPL-MCNC: 5.7 G/DL (ref 6–8.5)
PROT UR QL STRIP: NEGATIVE
QT INTERVAL: 386 MS
RBC # BLD AUTO: 4.06 10*6/MM3 (ref 3.77–5.28)
SALICYLATES SERPL-MCNC: <0.3 MG/DL
SARS-COV-2 ORF1AB RESP QL NAA+PROBE: NOT DETECTED
SODIUM SERPL-SCNC: 141 MMOL/L (ref 136–145)
SP GR UR STRIP: 1.02 (ref 1–1.03)
TROPONIN T SERPL-MCNC: <0.01 NG/ML (ref 0–0.03)
UROBILINOGEN UR QL STRIP: ABNORMAL
WBC # BLD AUTO: 10.95 10*3/MM3 (ref 3.4–10.8)

## 2021-01-17 PROCEDURE — 70450 CT HEAD/BRAIN W/O DYE: CPT

## 2021-01-17 PROCEDURE — 80307 DRUG TEST PRSMV CHEM ANLYZR: CPT | Performed by: EMERGENCY MEDICINE

## 2021-01-17 PROCEDURE — 36415 COLL VENOUS BLD VENIPUNCTURE: CPT | Performed by: EMERGENCY MEDICINE

## 2021-01-17 PROCEDURE — 25010000002 LORAZEPAM PER 2 MG: Performed by: EMERGENCY MEDICINE

## 2021-01-17 PROCEDURE — 80179 DRUG ASSAY SALICYLATE: CPT | Performed by: EMERGENCY MEDICINE

## 2021-01-17 PROCEDURE — 73060 X-RAY EXAM OF HUMERUS: CPT

## 2021-01-17 PROCEDURE — 25010000003 LEVETIRACETAM IN NACL 0.75% 1000 MG/100ML SOLUTION: Performed by: EMERGENCY MEDICINE

## 2021-01-17 PROCEDURE — 83735 ASSAY OF MAGNESIUM: CPT | Performed by: EMERGENCY MEDICINE

## 2021-01-17 PROCEDURE — P9612 CATHETERIZE FOR URINE SPEC: HCPCS

## 2021-01-17 PROCEDURE — 82962 GLUCOSE BLOOD TEST: CPT

## 2021-01-17 PROCEDURE — 93005 ELECTROCARDIOGRAM TRACING: CPT | Performed by: EMERGENCY MEDICINE

## 2021-01-17 PROCEDURE — G0378 HOSPITAL OBSERVATION PER HR: HCPCS

## 2021-01-17 PROCEDURE — 80053 COMPREHEN METABOLIC PANEL: CPT | Performed by: EMERGENCY MEDICINE

## 2021-01-17 PROCEDURE — 84484 ASSAY OF TROPONIN QUANT: CPT | Performed by: EMERGENCY MEDICINE

## 2021-01-17 PROCEDURE — U0004 COV-19 TEST NON-CDC HGH THRU: HCPCS | Performed by: EMERGENCY MEDICINE

## 2021-01-17 PROCEDURE — 25010000002 LORAZEPAM PER 2 MG: Performed by: INTERNAL MEDICINE

## 2021-01-17 PROCEDURE — 85025 COMPLETE CBC W/AUTO DIFF WBC: CPT | Performed by: EMERGENCY MEDICINE

## 2021-01-17 PROCEDURE — 80143 DRUG ASSAY ACETAMINOPHEN: CPT | Performed by: EMERGENCY MEDICINE

## 2021-01-17 PROCEDURE — 99254 IP/OBS CNSLTJ NEW/EST MOD 60: CPT | Performed by: PSYCHIATRY & NEUROLOGY

## 2021-01-17 PROCEDURE — 99285 EMERGENCY DEPT VISIT HI MDM: CPT

## 2021-01-17 PROCEDURE — 25010000003 LEVETIRACETAM IN NACL 0.75% 1000 MG/100ML SOLUTION: Performed by: PSYCHIATRY & NEUROLOGY

## 2021-01-17 PROCEDURE — 25010000002 LORAZEPAM PER 2 MG: Performed by: PSYCHIATRY & NEUROLOGY

## 2021-01-17 PROCEDURE — 82077 ASSAY SPEC XCP UR&BREATH IA: CPT | Performed by: EMERGENCY MEDICINE

## 2021-01-17 PROCEDURE — 81003 URINALYSIS AUTO W/O SCOPE: CPT | Performed by: EMERGENCY MEDICINE

## 2021-01-17 RX ORDER — CLONIDINE HYDROCHLORIDE 0.1 MG/1
0.1 TABLET ORAL ONCE AS NEEDED
Status: ACTIVE | OUTPATIENT
Start: 2021-01-21 | End: 2021-01-22

## 2021-01-17 RX ORDER — CLONIDINE HYDROCHLORIDE 0.1 MG/1
0.1 TABLET ORAL 2 TIMES DAILY PRN
Status: ACTIVE | OUTPATIENT
Start: 2021-01-20 | End: 2021-01-21

## 2021-01-17 RX ORDER — CLONIDINE HYDROCHLORIDE 0.1 MG/1
0.1 TABLET ORAL 4 TIMES DAILY PRN
Status: ACTIVE | OUTPATIENT
Start: 2021-01-18 | End: 2021-01-19

## 2021-01-17 RX ORDER — LORAZEPAM 2 MG/ML
1 INJECTION INTRAMUSCULAR ONCE
Status: DISCONTINUED | OUTPATIENT
Start: 2021-01-17 | End: 2021-01-17

## 2021-01-17 RX ORDER — SODIUM CHLORIDE 0.9 % (FLUSH) 0.9 %
10 SYRINGE (ML) INJECTION AS NEEDED
Status: DISCONTINUED | OUTPATIENT
Start: 2021-01-17 | End: 2021-01-25 | Stop reason: HOSPADM

## 2021-01-17 RX ORDER — ACETAMINOPHEN 160 MG/5ML
650 SOLUTION ORAL EVERY 4 HOURS PRN
Status: DISCONTINUED | OUTPATIENT
Start: 2021-01-17 | End: 2021-01-25 | Stop reason: HOSPADM

## 2021-01-17 RX ORDER — IPRATROPIUM BROMIDE AND ALBUTEROL SULFATE 2.5; .5 MG/3ML; MG/3ML
3 SOLUTION RESPIRATORY (INHALATION) EVERY 4 HOURS PRN
Status: DISCONTINUED | OUTPATIENT
Start: 2021-01-17 | End: 2021-01-25 | Stop reason: HOSPADM

## 2021-01-17 RX ORDER — LORAZEPAM 2 MG/ML
1 INJECTION INTRAMUSCULAR
Status: DISCONTINUED | OUTPATIENT
Start: 2021-01-17 | End: 2021-01-23

## 2021-01-17 RX ORDER — SODIUM CHLORIDE 9 MG/ML
125 INJECTION, SOLUTION INTRAVENOUS CONTINUOUS
Status: DISCONTINUED | OUTPATIENT
Start: 2021-01-17 | End: 2021-01-19

## 2021-01-17 RX ORDER — SODIUM CHLORIDE 0.9 % (FLUSH) 0.9 %
10 SYRINGE (ML) INJECTION EVERY 12 HOURS SCHEDULED
Status: DISCONTINUED | OUTPATIENT
Start: 2021-01-17 | End: 2021-01-25 | Stop reason: HOSPADM

## 2021-01-17 RX ORDER — LORAZEPAM 2 MG/ML
1 INJECTION INTRAMUSCULAR ONCE
Status: COMPLETED | OUTPATIENT
Start: 2021-01-17 | End: 2021-01-17

## 2021-01-17 RX ORDER — ACETAMINOPHEN 325 MG/1
650 TABLET ORAL EVERY 4 HOURS PRN
Status: DISCONTINUED | OUTPATIENT
Start: 2021-01-17 | End: 2021-01-25 | Stop reason: HOSPADM

## 2021-01-17 RX ORDER — ZONISAMIDE 100 MG/1
200 CAPSULE ORAL 2 TIMES DAILY
COMMUNITY
End: 2021-04-15 | Stop reason: HOSPADM

## 2021-01-17 RX ORDER — OLANZAPINE 10 MG/1
5 INJECTION, POWDER, LYOPHILIZED, FOR SOLUTION INTRAMUSCULAR EVERY 4 HOURS PRN
Status: DISCONTINUED | OUTPATIENT
Start: 2021-01-17 | End: 2021-01-18

## 2021-01-17 RX ORDER — LEVETIRACETAM 10 MG/ML
1000 INJECTION INTRAVASCULAR ONCE
Status: COMPLETED | OUTPATIENT
Start: 2021-01-17 | End: 2021-01-17

## 2021-01-17 RX ORDER — LEVETIRACETAM 10 MG/ML
1000 INJECTION INTRAVASCULAR EVERY 12 HOURS SCHEDULED
Status: DISCONTINUED | OUTPATIENT
Start: 2021-01-17 | End: 2021-01-21

## 2021-01-17 RX ORDER — CLONIDINE HYDROCHLORIDE 0.1 MG/1
0.1 TABLET ORAL 4 TIMES DAILY PRN
Status: ACTIVE | OUTPATIENT
Start: 2021-01-17 | End: 2021-01-18

## 2021-01-17 RX ORDER — CLONIDINE HYDROCHLORIDE 0.1 MG/1
0.1 TABLET ORAL 3 TIMES DAILY PRN
Status: ACTIVE | OUTPATIENT
Start: 2021-01-19 | End: 2021-01-20

## 2021-01-17 RX ORDER — NITROGLYCERIN 0.4 MG/1
0.4 TABLET SUBLINGUAL
Status: DISCONTINUED | OUTPATIENT
Start: 2021-01-17 | End: 2021-01-25 | Stop reason: HOSPADM

## 2021-01-17 RX ORDER — OLANZAPINE 10 MG/1
5 INJECTION, POWDER, LYOPHILIZED, FOR SOLUTION INTRAMUSCULAR EVERY 4 HOURS PRN
Status: DISCONTINUED | OUTPATIENT
Start: 2021-01-17 | End: 2021-01-17

## 2021-01-17 RX ORDER — ACETAMINOPHEN 650 MG/1
650 SUPPOSITORY RECTAL EVERY 4 HOURS PRN
Status: DISCONTINUED | OUTPATIENT
Start: 2021-01-17 | End: 2021-01-25 | Stop reason: HOSPADM

## 2021-01-17 RX ORDER — LEVETIRACETAM 5 MG/ML
500 INJECTION INTRAVASCULAR EVERY 12 HOURS SCHEDULED
Status: DISCONTINUED | OUTPATIENT
Start: 2021-01-17 | End: 2021-01-17

## 2021-01-17 RX ORDER — ONDANSETRON 2 MG/ML
4 INJECTION INTRAMUSCULAR; INTRAVENOUS EVERY 6 HOURS PRN
Status: DISCONTINUED | OUTPATIENT
Start: 2021-01-17 | End: 2021-01-25 | Stop reason: HOSPADM

## 2021-01-17 RX ADMIN — LORAZEPAM 1 MG: 2 INJECTION INTRAMUSCULAR; INTRAVENOUS at 17:39

## 2021-01-17 RX ADMIN — LORAZEPAM 1 MG: 2 INJECTION INTRAMUSCULAR; INTRAVENOUS at 08:38

## 2021-01-17 RX ADMIN — SODIUM CHLORIDE 125 ML/HR: 9 INJECTION, SOLUTION INTRAVENOUS at 19:56

## 2021-01-17 RX ADMIN — LEVETIRACETAM 1000 MG: 10 INJECTION INTRAVENOUS at 19:54

## 2021-01-17 RX ADMIN — SODIUM CHLORIDE 500 ML: 9 INJECTION, SOLUTION INTRAVENOUS at 10:21

## 2021-01-17 RX ADMIN — LEVETIRACETAM 1000 MG: 10 INJECTION INTRAVASCULAR at 08:58

## 2021-01-17 RX ADMIN — LORAZEPAM 1 MG: 2 INJECTION INTRAMUSCULAR; INTRAVENOUS at 09:46

## 2021-01-17 RX ADMIN — SODIUM CHLORIDE 125 ML/HR: 9 INJECTION, SOLUTION INTRAVENOUS at 13:09

## 2021-01-17 NOTE — ED TRIAGE NOTES
"Pt  states pt had a seizure approx 1 hour ago. States she tensed up and eyes rolled back in head.  Pt has history of seizures and takes anti seizure medicine.   state pt takes pain pills on a daily basis, \"whatever we can get off street\". States she takes them by mouth, does not snort of inject.  Pt very fidgety in triage, yelling inappropriately.  states pt normally calm and talkative.  States she last took pain pills yesterday.  Maxx- [phone]  "
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
## 2021-01-17 NOTE — ED NOTES
Nursing report ED to floor  Mary Chavez  50 y.o.  female    HPI (triage note):   Chief Complaint   Patient presents with   • Seizures       Admitting doctor:   Mason Ortega MD    Admitting diagnosis:   The primary encounter diagnosis was Acute metabolic encephalopathy. A diagnosis of Closed fracture of proximal end of right humerus, unspecified fracture morphology, initial encounter was also pertinent to this visit.    Code status:   Current Code Status     Date Active Code Status Order ID Comments User Context       1/17/2021 1344 CPR [id number]  Flaca Aguilar, [facility] ED     Advance Care Planning Activity      Questions for Current Code Status     Question Answer Comment    Code Status CPR     Medical Interventions (Level of Support Prior to Arrest) Full           Allergies:   Patient has no known allergies.    Weight:   There were no vitals filed for this visit.    Most recent vitals:   Vitals:    01/17/21 0843 01/17/21 0938 01/17/21 1245 01/17/21 1306   BP: 118/96  108/71    Pulse: 109 107  83   Resp: 18      Temp:       SpO2: 96% 97%  97%       Active LDAs/IV Access:   Lines, Drains & Airways    Active LDAs     Name:   Placement date:   Placement time:   Site:   Days:    Peripheral IV 01/17/21 0834 Left Hand   01/17/21    0834    Hand   less than 1                Labs (abnormal labs have a star):   Labs Reviewed   COMPREHENSIVE METABOLIC PANEL - Abnormal; Notable for the following components:       Result Value    Glucose 163 (*)     Potassium 3.4 (*)     Total Protein 5.7 (*)     Albumin 3.00 (*)     Alkaline Phosphatase 164 (*)     All other components within normal limits    Narrative:     GFR Normal >60  Chronic Kidney Disease <60  Kidney Failure <15     URINALYSIS W/ MICROSCOPIC IF INDICATED (NO CULTURE) - Abnormal; Notable for the following components:    Color, UA Dark Yellow (*)     All other components within normal limits    Narrative:     Urine microscopic not indicated.   CBC WITH AUTO  DIFFERENTIAL - Abnormal; Notable for the following components:    WBC 10.95 (*)     Neutrophil % 89.5 (*)     Lymphocyte % 4.2 (*)     Eosinophil % 0.0 (*)     Neutrophils, Absolute 9.81 (*)     Lymphocytes, Absolute 0.46 (*)     All other components within normal limits   URINE DRUG SCREEN - Abnormal; Notable for the following components:    Opiate Screen Positive (*)     All other components within normal limits    Narrative:     Negative Thresholds For Drugs Screened:     Amphetamines               500 ng/ml   Barbiturates               200 ng/ml   Benzodiazepines            100 ng/ml   Cocaine                    300 ng/ml   Methadone                  300 ng/ml   Opiates                    300 ng/ml   Oxycodone                  100 ng/ml   THC                        50 ng/ml    The Normal Value for all drugs tested is negative. This report includes final unconfirmed screening results to be used for medical treatment purposes only. Unconfirmed results must not be used for non-medical purposes such as employment or legal testing. Clinical consideration should be applied to any drug of abuse test, particulary when unconfirmed results are used.   ACETAMINOPHEN LEVEL - Normal   SALICYLATE LEVEL - Normal    Narrative:     Therapeutic range for Salicylates:  3.0 - 10.0 mg/dL for antipyretic/analgesic conditions  15.0 - 30.0 mg/dL for anti-inflammatory conditions   TROPONIN (IN-HOUSE) - Normal    Narrative:     Troponin T Reference Range:  <= 0.03 ng/mL-   Negative for AMI  >0.03 ng/mL-     Abnormal for myocardial necrosis.  Clinicians would have to utilize clinical acumen, EKG, Troponin and serial changes to determine if it is an Acute Myocardial Infarction or myocardial injury due to an underlying chronic condition.       Results may be falsely decreased if patient taking Biotin.     MAGNESIUM - Normal   COVID PRE-OP / PRE-PROCEDURE SCREENING ORDER (NO ISOLATION)    Narrative:     The following orders were created for  panel order COVID PRE-OP / PRE-PROCEDURE SCREENING ORDER (NO ISOLATION) - Swab, Nasopharynx.  Procedure                               Abnormality         Status                     ---------                               -----------         ------                     COVID-19,APTIMA PANTHER,...[[id number]]                      In process                   Please view results for these tests on the individual orders.   COVID-19,APTIMA PANTHER,[facility],NP/OP SWAB IN UTM/VTM/SALINE TRANSPORT MEDIA,24 HR TAT   ETHANOL   CBC AND DIFFERENTIAL    Narrative:     The following orders were created for panel order CBC & Differential.  Procedure                               Abnormality         Status                     ---------                               -----------         ------                     CBC Auto Differential[[id number]]        Abnormal            Final result                 Please view results for these tests on the individual orders.       EKG:   ECG 12 Lead   Final Result   HEART RATE= 101  bpm   RR Interval= 596  ms   CA Interval= 128  ms   P Horizontal Axis= -29  deg   P Front Axis= 71  deg   QRSD Interval= 98  ms   QT Interval= 386  ms   QRS Axis= -38  deg   T Wave Axis=   deg   - ABNORMAL ECG -   Poor quality tracing repeat   Electronically Signed By: Gordon Clarke) ([facility]) 17-Jan-2021 12:05:04   Date and Time of Study: 2021-01-17 09:50:15          Meds given in ED:   Medications   sodium chloride 0.9 % flush 10 mL (has no administration in time range)   sodium chloride 0.9 % infusion (125 mL/hr Intravenous New Bag 1/17/21 1309)   ondansetron (ZOFRAN) injection 4 mg (has no administration in time range)   nitroglycerin (NITROSTAT) SL tablet 0.4 mg (has no administration in time range)   sodium chloride 0.9 % flush 10 mL (has no administration in time range)   sodium chloride 0.9 % flush 10 mL (has no administration in time range)   acetaminophen (TYLENOL) tablet 650 mg (has no administration in  time range)     Or   acetaminophen (TYLENOL) 160 MG/5ML solution 650 mg (has no administration in time range)     Or   acetaminophen (TYLENOL) suppository 650 mg (has no administration in time range)   ipratropium-albuterol (DUO-NEB) nebulizer solution 3 mL (has no administration in time range)   levETIRAcetam in NaCl 0.75% (KEPPRA) IVPB 1,000 mg (0 mg Intravenous Stopped 1/17/21 0926)   LORazepam (ATIVAN) injection 1 mg (1 mg Intravenous Given 1/17/21 0838)   LORazepam (ATIVAN) injection 1 mg (1 mg Intravenous Given 1/17/21 0946)   sodium chloride 0.9 % bolus 500 mL (0 mL Intravenous Stopped 1/17/21 1308)       Imaging results:  Xr Humerus Right    Result Date: 1/17/2021   Right humerus fracture.  This report was finalized on 1/17/2021 9:43 AM by Dr. Tiago Brand M.D.      Ct Head Without Contrast    Result Date: 1/17/2021   The exam is significantly limited as described. No hydrocephalus or midline shift. Follow-up/further evaluation is suggested as indications persist.  Discussed by telephone with Dr. Gillespie at 0929, 1/17/221.    This report was finalized on 1/17/2021 9:32 AM by Dr. Tiago Brand M.D.        Ambulatory status:   -Up with assistance      Social issues:   Social History     Socioeconomic History   • Marital status:      Spouse name: Not on file   • Number of children: Not on file   • Years of education: Not on file   • Highest education level: Not on file   Tobacco Use   • Smoking status: Current Every Day Smoker     Packs/day: 1.50     Types: Cigarettes   Substance and Sexual Activity   • Alcohol use: No   • Drug use: Yes     Types: Marijuana     Comment: heroin snorted   • Sexual activity: Defer    Nursing report ED to floor       Che Sarkar RN  01/17/21 1502

## 2021-01-17 NOTE — PLAN OF CARE
Goal Outcome Evaluation:  Plan of Care Reviewed With: patient  Progress: no change  Outcome Summary: Pt admited from ED with reports of seizure activity from spouse; which spouse reports that patient has been getting opioids off street.  Pt given total 2 mg of ativan in ED; patient has been calm and resting since came to floor.  Pt has right arm in sling for broken humurs.  Consults called per orders.  Will continue to monitor.
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
## 2021-01-17 NOTE — ED NOTES
Pt currently thrashing around, yelling incoherent phases at this time. Urine and labs are unable to be obtained at this time. Seizure precautions in place. Will continue to monitor at this time. Pt redirected, reoriented and reassured.     Patient was placed in face mask during triage process. Patient was wearing facemask when I entered the room and throughout our encounter. I wore full protective equipment throughout this patient encounter including a face mask, eye protection, and gloves. Hand hygiene was performed before donning protective equipment and again following doffing of PPE after leaving the room.        Che Sarkar, RN  01/17/21 0902

## 2021-01-17 NOTE — NURSING NOTE
"Pt up to beside toilet.  Pt kept jerky herself around stating she was trying to \"sit on bedpan and get pee out\".  Pt could not sit still.  Put pull up back on pt and laid her back down.  Gave ativan as ordered.  "
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
## 2021-01-17 NOTE — ED NOTES
Pt transported to CT for repeat CT without change in status, pt remains resting with stable vitals; brief placed on pt     Jai Dumont, RN  01/17/21 6321

## 2021-01-18 LAB
AMMONIA BLD-SCNC: 34 UMOL/L (ref 11–51)
ANION GAP SERPL CALCULATED.3IONS-SCNC: 8.6 MMOL/L (ref 5–15)
BUN SERPL-MCNC: 16 MG/DL (ref 6–20)
BUN/CREAT SERPL: 32 (ref 7–25)
CALCIUM SPEC-SCNC: 8.2 MG/DL (ref 8.6–10.5)
CHLORIDE SERPL-SCNC: 113 MMOL/L (ref 98–107)
CO2 SERPL-SCNC: 21.4 MMOL/L (ref 22–29)
CREAT SERPL-MCNC: 0.5 MG/DL (ref 0.57–1)
DEPRECATED RDW RBC AUTO: 38.6 FL (ref 37–54)
ERYTHROCYTE [DISTWIDTH] IN BLOOD BY AUTOMATED COUNT: 11.9 % (ref 12.3–15.4)
GFR SERPL CREATININE-BSD FRML MDRD: 130 ML/MIN/1.73
GLUCOSE SERPL-MCNC: 97 MG/DL (ref 65–99)
HCT VFR BLD AUTO: 29.5 % (ref 34–46.6)
HGB BLD-MCNC: 9.9 G/DL (ref 12–15.9)
MAGNESIUM SERPL-MCNC: 1.8 MG/DL (ref 1.6–2.6)
MCH RBC QN AUTO: 30 PG (ref 26.6–33)
MCHC RBC AUTO-ENTMCNC: 33.6 G/DL (ref 31.5–35.7)
MCV RBC AUTO: 89.4 FL (ref 79–97)
PLATELET # BLD AUTO: 209 10*3/MM3 (ref 140–450)
PMV BLD AUTO: 9.6 FL (ref 6–12)
POTASSIUM SERPL-SCNC: 3.3 MMOL/L (ref 3.5–5.2)
POTASSIUM SERPL-SCNC: 3.7 MMOL/L (ref 3.5–5.2)
RBC # BLD AUTO: 3.3 10*6/MM3 (ref 3.77–5.28)
SODIUM SERPL-SCNC: 143 MMOL/L (ref 136–145)
TSH SERPL DL<=0.05 MIU/L-ACNC: 0.4 UIU/ML (ref 0.27–4.2)
WBC # BLD AUTO: 7.36 10*3/MM3 (ref 3.4–10.8)

## 2021-01-18 PROCEDURE — 83735 ASSAY OF MAGNESIUM: CPT | Performed by: INTERNAL MEDICINE

## 2021-01-18 PROCEDURE — 25010000002 LORAZEPAM PER 2 MG: Performed by: PSYCHIATRY & NEUROLOGY

## 2021-01-18 PROCEDURE — 80048 BASIC METABOLIC PNL TOTAL CA: CPT | Performed by: NURSE PRACTITIONER

## 2021-01-18 PROCEDURE — 25010000002 LORAZEPAM PER 2 MG: Performed by: INTERNAL MEDICINE

## 2021-01-18 PROCEDURE — G0378 HOSPITAL OBSERVATION PER HR: HCPCS

## 2021-01-18 PROCEDURE — 99233 SBSQ HOSP IP/OBS HIGH 50: CPT | Performed by: NURSE PRACTITIONER

## 2021-01-18 PROCEDURE — 25010000003 LEVETIRACETAM IN NACL 0.75% 1000 MG/100ML SOLUTION: Performed by: PSYCHIATRY & NEUROLOGY

## 2021-01-18 PROCEDURE — 85027 COMPLETE CBC AUTOMATED: CPT | Performed by: NURSE PRACTITIONER

## 2021-01-18 PROCEDURE — 84443 ASSAY THYROID STIM HORMONE: CPT | Performed by: NURSE PRACTITIONER

## 2021-01-18 PROCEDURE — 84132 ASSAY OF SERUM POTASSIUM: CPT | Performed by: INTERNAL MEDICINE

## 2021-01-18 PROCEDURE — 82140 ASSAY OF AMMONIA: CPT | Performed by: NURSE PRACTITIONER

## 2021-01-18 PROCEDURE — 86140 C-REACTIVE PROTEIN: CPT | Performed by: INTERNAL MEDICINE

## 2021-01-18 PROCEDURE — 25010000003 POTASSIUM CHLORIDE 10 MEQ/100ML SOLUTION: Performed by: INTERNAL MEDICINE

## 2021-01-18 RX ORDER — OLANZAPINE 10 MG/1
5 INJECTION, POWDER, LYOPHILIZED, FOR SOLUTION INTRAMUSCULAR EVERY 8 HOURS PRN
Status: DISCONTINUED | OUTPATIENT
Start: 2021-01-18 | End: 2021-01-25 | Stop reason: HOSPADM

## 2021-01-18 RX ORDER — NICOTINE 21 MG/24HR
1 PATCH, TRANSDERMAL 24 HOURS TRANSDERMAL
Status: DISCONTINUED | OUTPATIENT
Start: 2021-01-18 | End: 2021-01-25 | Stop reason: HOSPADM

## 2021-01-18 RX ORDER — POTASSIUM CHLORIDE 7.45 MG/ML
10 INJECTION INTRAVENOUS
Status: DISCONTINUED | OUTPATIENT
Start: 2021-01-18 | End: 2021-01-25 | Stop reason: HOSPADM

## 2021-01-18 RX ORDER — POTASSIUM CHLORIDE 750 MG/1
40 TABLET, FILM COATED, EXTENDED RELEASE ORAL AS NEEDED
Status: DISCONTINUED | OUTPATIENT
Start: 2021-01-18 | End: 2021-01-25 | Stop reason: HOSPADM

## 2021-01-18 RX ORDER — POTASSIUM CHLORIDE 1.5 G/1.77G
40 POWDER, FOR SOLUTION ORAL AS NEEDED
Status: DISCONTINUED | OUTPATIENT
Start: 2021-01-18 | End: 2021-01-25 | Stop reason: HOSPADM

## 2021-01-18 RX ADMIN — POTASSIUM CHLORIDE 10 MEQ: 7.46 INJECTION, SOLUTION INTRAVENOUS at 20:33

## 2021-01-18 RX ADMIN — SODIUM CHLORIDE, PRESERVATIVE FREE 10 ML: 5 INJECTION INTRAVENOUS at 09:50

## 2021-01-18 RX ADMIN — POTASSIUM CHLORIDE 10 MEQ: 7.46 INJECTION, SOLUTION INTRAVENOUS at 15:54

## 2021-01-18 RX ADMIN — SODIUM CHLORIDE 125 ML/HR: 9 INJECTION, SOLUTION INTRAVENOUS at 14:35

## 2021-01-18 RX ADMIN — LEVETIRACETAM 1000 MG: 10 INJECTION INTRAVENOUS at 09:51

## 2021-01-18 RX ADMIN — Medication 1 PATCH: at 12:10

## 2021-01-18 RX ADMIN — POTASSIUM CHLORIDE 10 MEQ: 7.46 INJECTION, SOLUTION INTRAVENOUS at 13:29

## 2021-01-18 RX ADMIN — POTASSIUM CHLORIDE 10 MEQ: 7.46 INJECTION, SOLUTION INTRAVENOUS at 14:34

## 2021-01-18 RX ADMIN — POTASSIUM CHLORIDE 10 MEQ: 7.46 INJECTION, SOLUTION INTRAVENOUS at 12:10

## 2021-01-18 RX ADMIN — LORAZEPAM 1 MG: 2 INJECTION INTRAMUSCULAR; INTRAVENOUS at 09:59

## 2021-01-18 RX ADMIN — LEVETIRACETAM 1000 MG: 10 INJECTION INTRAVENOUS at 20:33

## 2021-01-18 RX ADMIN — SODIUM CHLORIDE, PRESERVATIVE FREE 10 ML: 5 INJECTION INTRAVENOUS at 09:59
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
## 2021-01-18 NOTE — CONSULTS
"Adult Nutrition  Assessment/PES    Patient Name:  Mary Chavez  YOB: 1970  MRN: [id number]  Admit Date:  1/17/2021    Assessment Date:  1/18/2021    Comments:  Nutrition consult d/t MST score of 3 per nurse admission screen.  Admitted with seizures, closed fracture of R humerus.  Polysubstance abuse.  Confused, hallucinating.  Currently NPO.    Reports of significant weight loss and significant weight loss noted per chart weight history.      Will follow up to interview patient and physically assess once diet has been advanced.  Will need MSA.    Reason for Assessment     Row Name 01/18/21 0954          Reason for Assessment    Reason For Assessment  identified at risk by screening criteria;nurse/nurse practitioner consult     Diagnosis  substance use/abuse;trauma;other (see comments);neurologic conditions polysubstance abuse, seizure, gastric bypass; adm with seizures, R humerus fracture     Identified At Risk by Screening Criteria  MST SCORE 2+         Nutrition/Diet History     Row Name 01/18/21 0955          Nutrition/Diet History    Typical Food/Fluid Intake  currently NPO         Anthropometrics     Row Name 01/18/21 0955 01/18/21 0543       Anthropometrics    Height  167.6 cm (65.98\")  --    Weight  --  55.3 kg (121 lb 14.6 oz)       Admit Weight    Admit Weight  -- 121# 1/18  --       Ideal Body Weight (IBW)    Ideal Body Weight (IBW) (kg)  59.54  --       Usual Body Weight (UBW)    Usual Body Weight  76.7 kg (169 lb) 7/2/2020  --    Weight Loss Time Frame  48# (28.4%)  --       Body Mass Index (BMI)    BMI Assessment  BMI 18.5-24.9: normal 19.63  --        Labs/Tests/Procedures/Meds     Row Name 01/18/21 0958          Labs/Procedures/Meds    Lab Results Reviewed  reviewed, pertinent     Lab Results Comments  K, Creat, Ca, Hgb, Hct        Diagnostic Tests/Procedures    Diagnostic Test/Procedure Reviewed  reviewed, pertinent        Medications    Pertinent Medications Reviewed  reviewed, " "pertinent     Pertinent Medications Comments  keppra, IVFs         Physical Findings     Row Name 01/18/21 1008          Physical Findings    Skin  other (see comments) B=17, unkempt, unclean, scars         Estimated/Assessed Needs     Row Name 01/18/21 1009 01/18/21 0955       Calculation Measurements    Weight Used For Calculations  55.3 kg (121 lb 14.6 oz)  --    Height  --  167.6 cm (65.98\")       Estimated/Assessed Needs       KCAL/KG    KCAL/KG  30 Kcal/Kg (kcal);35 Kcal/Kg (kcal)  --    30 Kcal/Kg (kcal)  1659  --    35 Kcal/Kg (kcal)  1935.5  --       Protein Requirements    Weight Used For Protein Calculations  55.3 kg (121 lb 14.6 oz)  --    Est Protein Requirement Amount (gms/kg); used 1.2-1.5 g/kg  66-83  --       Fluid Requirements    Fluid Requirements (mL/day)  1659  --        Nutrition Prescription Ordered     Row Name 01/18/21 1009          Nutrition Prescription PO    Current PO Diet  NPO         Problem/Interventions:  Problem 1     Row Name 01/18/21 1010          Nutrition Diagnoses Problem 1    Problem 1  Inadequate Intake/Infusion     Inadequate Intake Type  Oral     Macronutrient  Kcal;Protein     Etiology (related to)  MNT for Treatment/Condition     Signs/Symptoms (evidenced by)  NPO         Intervention Goal     Row Name 01/18/21 1010          Intervention Goal    General  Maintain nutrition;Disease management/therapy;Meet nutritional needs for age/condition     PO  Initiate feeding     Weight  Maintain weight         Nutrition Intervention     Row Name 01/18/21 1010          Nutrition Intervention    RD/Tech Action  Await begin PO;Care plan reviewd;Follow Tx progress         Education/Evaluation     Row Name 01/18/21 1010          Education    Education  Will Instruct as appropriate        Monitor/Evaluation    Monitor  Per protocol;I&O;Pertinent labs;Weight;Skin status;Symptoms           Electronically signed by:  Evelyn Rodríguez RD  01/18/21 10:10 EST  "
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
## 2021-01-18 NOTE — SIGNIFICANT NOTE
01/18/21 1242   OTHER   Discipline speech language pathologist   Rehab Time/Intention   Session Not Performed other (see comments)  (Discussed with RN. Patient not appropriate for clinical swallow eval this date secondary to mental status. Speech therapy to follow for evaluation as patient appropriate.)

## 2021-01-18 NOTE — CONSULTS
[facility] Note.    See full consult by Psychiatrist Dr. Forbes dated today. Psychiatrist's documentation indicated Pt's delirium is not opiod-withdrawal related and its etiology should continue to be investigated. Access cannot offer resources while Pt is in a state of delirium.     Spoke to RNLing re: the above.     Please re-consult Access when medically clear and [facility] tx resources can be provided at that time.

## 2021-01-18 NOTE — SIGNIFICANT NOTE
Pt anxious & restless. Ativan admin as order & as a premed for EEG, however, once transferred to stretcher pt had increased restlessness. Pt unsafe to transport off floor at this time RT attempting to get off the stretcher, repeatedly moving arms & legs off stretcher, and not following directions. EEG updated that transport canceled at this time & they will be notified if/when pt appropriate for transfer for an EEG.

## 2021-01-18 NOTE — PLAN OF CARE
Goal Outcome Evaluation:  Plan of Care Reviewed With: patient  Progress: improving  Outcome Summary: Confused and hallucinating. Constant restless movements. no seizure activity. Bladder scan 900, I/O cath x1 850cc output. Right arm in sling, no complaints at this time. vss. plan for EEG. will continue to monitor.

## 2021-01-18 NOTE — PROGRESS NOTES
"DOS: 2021  NAME: Mary Chavez   : 1970  PCP: Murphy Carty MD    Chief Complaint   Patient presents with   • Seizures        Subjective: Patient seen in follow-up, however patient and problem are new to me. Confused and hallucinating last night per RN.  Received Ativan this morning, has not really helped per RN but she has not been trying to get out of the bed.  As I entered the room she was picking at lines and her hospital gown.  Only oriented to self.  Needed in and out cath last night. Right arm in sling due to closed humerus fracture.    Objective:  Vital signs:   Vitals:    21 2300 21 0543 21 0655 21 0955   BP:   120/77    BP Location:   Left arm    Patient Position:   Lying    Pulse:   100    Resp: 16  16    Temp: 98.3 °F (36.8 °C)  97.8 °F (36.6 °C)    TempSrc: Axillary  Oral    SpO2:   100%    Weight:  55.3 kg (121 lb 14.6 oz)     Height:    167.6 cm (65.98\")       Current Facility-Administered Medications:   •  acetaminophen (TYLENOL) tablet 650 mg, 650 mg, Oral, Q4H PRN **OR** acetaminophen (TYLENOL) 160 MG/5ML solution 650 mg, 650 mg, Oral, Q4H PRN **OR** acetaminophen (TYLENOL) suppository 650 mg, 650 mg, Rectal, Q4H PRN, Flaca Aguilar, APRN  •  [] cloNIDine (CATAPRES) tablet 0.1 mg, 0.1 mg, Oral, 4x Daily PRN **FOLLOWED BY** cloNIDine (CATAPRES) tablet 0.1 mg, 0.1 mg, Oral, 4x Daily PRN **FOLLOWED BY** [START ON 2021] cloNIDine (CATAPRES) tablet 0.1 mg, 0.1 mg, Oral, TID PRN **FOLLOWED BY** [START ON 2021] cloNIDine (CATAPRES) tablet 0.1 mg, 0.1 mg, Oral, BID PRN **FOLLOWED BY** [START ON 2021] cloNIDine (CATAPRES) tablet 0.1 mg, 0.1 mg, Oral, Once PRN, Flaca Aguilar APRN  •  ipratropium-albuterol (DUO-NEB) nebulizer solution 3 mL, 3 mL, Nebulization, Q4H PRN, Flaca Aguilar APRN  •  levETIRAcetam in NaCl 0.75% (KEPPRA) IVPB 1,000 mg, 1,000 mg, Intravenous, Q12H, Chad Lance MD, Last Rate: 0 mL/hr at 21 0107, " 1,000 mg at 21 0951  •  LORazepam (ATIVAN) injection 1 mg, 1 mg, Intravenous, Q2H PRN, Chad Lance MD, 1 mg at 21 0959  •  nitroglycerin (NITROSTAT) SL tablet 0.4 mg, 0.4 mg, Sublingual, Q5 Min PRN, Flaca Aguilar, APRN  •  OLANZapine (zyPREXA) injection 5 mg, 5 mg, Intramuscular, Q4H PRN, Chad Lance MD  •  ondansetron (ZOFRAN) injection 4 mg, 4 mg, Intravenous, Q6H PRN, Flaca Aguilar APRSIVAKUMAR  •  potassium chloride (K-DUR,KLOR-CON) ER tablet 40 mEq, 40 mEq, Oral, PRN **OR** potassium chloride (KLOR-CON) packet 40 mEq, 40 mEq, Oral, PRN **OR** potassium chloride 10 mEq in 100 mL IVPB, 10 mEq, Intravenous, Q1H PRN, Mason Ortega MD  •  [COMPLETED] Insert peripheral IV, , , Once **AND** sodium chloride 0.9 % flush 10 mL, 10 mL, Intravenous, PRN, Rafael Gillespie MD, 10 mL at 21 0959  •  sodium chloride 0.9 % flush 10 mL, 10 mL, Intravenous, Q12H, Flaca Aguilar APRN, 10 mL at 21 0950  •  sodium chloride 0.9 % flush 10 mL, 10 mL, Intravenous, PRN, Flaca Aguilar APRN  •  sodium chloride 0.9 % infusion, 125 mL/hr, Intravenous, Continuous, Rafael Gillespie MD, Last Rate: 125 mL/hr at 21 0951, 125 mL/hr at 21 0951    PRN meds  •  acetaminophen **OR** acetaminophen **OR** acetaminophen  •  [] cloNIDine **FOLLOWED BY** cloNIDine **FOLLOWED BY** [START ON 2021] cloNIDine **FOLLOWED BY** [START ON 2021] cloNIDine **FOLLOWED BY** [START ON 2021] cloNIDine  •  ipratropium-albuterol  •  LORazepam  •  nitroglycerin  •  OLANZapine  •  ondansetron  •  potassium chloride **OR** potassium chloride **OR** potassium chloride  •  [COMPLETED] Insert peripheral IV **AND** sodium chloride  •  sodium chloride    No current facility-administered medications on file prior to encounter.      Current Outpatient Medications on File Prior to Encounter   Medication Sig   • albuterol sulfate  (90 Base) MCG/ACT inhaler Inhale 1-2 puffs Every 4 (Four) Hours  As Needed for Wheezing.   • amitriptyline (ELAVIL) 150 MG tablet Take 1 tablet by mouth Every Night. (Patient taking differently: Take 300 mg by mouth Every Night.)   • DULoxetine (CYMBALTA) 30 MG capsule Take 1 capsule by mouth Daily.   • levETIRAcetam (KEPPRA) 1000 MG tablet Take 1 tablet by mouth 2 (Two) Times a Day.   • nicotine (NICODERM CQ) 14 MG/24HR patch Place 1 patch on the skin as directed by provider Daily. Don't use if you continue to smoke   • zonisamide (ZONEGRAN) 100 MG capsule Take 200 mg by mouth 2 (two) times a day.       General appearance: Appears older than stated age, NAD, confused  HEENT: Normocephalic, atraumatic, PERRL, no masses or tenderness  COR: RRR  Resp: Even and unlabored  Extremities: Left hand edema  Skin: warm, dry    Neurological:   MS: oriented to self only, stated she was at [facility], unable to assess memory secondary to mental status, decreased attention/concentration, at times she had incomprehensible speech, no obvious neglect  CN: visual acuity grossly normal, visual fields full, PERRL, EOMI, no obvious facial droop, palate elevates symmetrically, shoulder shrug equal, tongue midline  Motor: Moving all extremities spontaneously, RUE in sling with minimal movement  Sensory: light touch sensation intact in all 4 ext.  Coordination: Unable to assess due to mental status  Rapid alternating movements:  Unable to assess due to mental status    Laboratory results:  Lab Results   Component Value Date    TSH 4.650 08/07/2019     Lab Results   Component Value Date    [id number] 332 10/26/2020     Lab Results   Component Value Date    HGBA1C 5.40 07/08/2020     Lab Results   Component Value Date    GLUCOSE 97 01/18/2021    BUN 16 01/18/2021    CREATININE 0.50 (L) 01/18/2021    EGFRIFNONA 130 01/18/2021    BCR 32.0 (H) 01/18/2021    K 3.3 (L) 01/18/2021    CO2 21.4 (L) 01/18/2021    CALCIUM 8.2 (L) 01/18/2021    ALBUMIN 3.00 (L) 01/17/2021    LABIL2 1.3 02/23/2020    AST 31  01/17/2021    ALT 17 01/17/2021     Lab Results   Component Value Date    WBC 7.36 01/18/2021    HGB 9.9 (L) 01/18/2021    HCT 29.5 (L) 01/18/2021    MCV 89.4 01/18/2021     01/18/2021     Brief Urine Lab Results  (Last result in the past 365 days)      Color   Clarity   Blood   Leuk Est   Nitrite   Protein   CREAT   Urine HCG        01/17/21 1021 Dark Yellow Clear Negative Negative Negative Negative             No results found for: ACANTHNAEG, AFBCX, BPERTUSSISCX, BLOODCX  No results found for: BCIDPCR, CXREFLEX, CSFCX, CULTURETIS  No results found for: CULTURES, HSVCX, URCX  No results found for: EYECULTURE, GCCX, LABHSV  No results found for: LEGIONELLA, MRSACX, MUMPSCX, MYCOPLASCX  No results found for: NOCARDIACX, STOOLCX  No results found for: THROATCX, UNSTIMCULT, URINECX, CULTURE, VZVCULTUR  No results found for: VIRALCULTU, WOUNDCX  Pain Management Panel     Pain Management Panel Latest Ref Rng & Units 1/17/2021 10/25/2020    AMPHETAMINES SCREEN, URINE Negative (arb'U) - POSITIVE(A)    BARBITURATES SCREEN Negative Negative Negative    BENZODIAZEPINE SCREEN, URINE Negative Negative Negative    COCAINE SCREEN, URINE Negative Negative Negative    METHADONE SCREEN, URINE Negative Negative -          Review and interpretation of imaging:  Xr Humerus Right    Result Date: 1/17/2021   Right humerus fracture.  This report was finalized on 1/17/2021 9:43 AM by Dr. Tiago Brand M.D.      Ct Head Without Contrast    Result Date: 1/18/2021  No evidence of intracranial hemorrhage, hydrocephalus or of abnormal extra-axial fluid. Confluent areas of decreased attenuation are noted involving the white matter similar as bilaterally, nonspecific. Further evaluation could be performed with MRI examination the brain with and without contrast.  The above information was called to and discussed with Dr. Gillespie.  Radiation dose reduction techniques were utilized, including automated exposure control and exposure  "modulation based on body size.  This report was finalized on 1/18/2021 8:48 AM by Dr. Keith Vines M.D.      Ct Head Without Contrast    Result Date: 1/17/2021   The exam is significantly limited as described. No hydrocephalus or midline shift. Follow-up/further evaluation is suggested as indications persist.  Discussed by telephone with Dr. Gillespie at 0929, 1/17/221.    This report was finalized on 1/17/2021 9:32 AM by Dr. Tiago Brand M.D.        Impression/Assessment:  This is a 50-year-old female with past medical history of polysubstance abuse, current smoker, cognitive impairment, chronic pain syndrome, anxiety, anemia, seizure, who presented to the hospital on 1/17/2021 with complaints of altered mental status and seizure-like activity. Her  stated that she had a seizure last Tuesday, again on Thursday causing her to hurt her right arm, and another on the morning of admission. Reportedly after the last seizure the morning of admission the  reports she had been inconsolable, jumping on tables and screaming and crying. She had similar behavior when she arrived to the ER yelling incoherent phrases and thrashing around in the bed. She received 1000 mg load of Keppra and 3 mg of lorazepam in the ER. BP on arrival 118/96, . EKG poor quality. . Temp 98.3.    Per review of the patient's home med list she is prescribed amitriptyline 150 mg nightly, Cymbalta 30 mg daily, Keppra 1000 mg twice daily, and zonisamide 200 mg twice daily.  reports daily compliance. He also stated that they bought \"pain pills from the street the day before admission\".     I reviewed her care everywhere records, she is followed by [place] [facility] and was last seen on 9/22/2020.  In summary it appears that the patient has been noncompliant with her AED and showing up for follow-up appointments.  She has had several ER visits for breakthrough seizures and altered mental status found to be secondary to drug " abuse.  She had an EEG in April 2018 that revealed mild generalized encephalopathy.  Appears she had several lab test sent at that time for cognitive decline including Lyme antibodies, MMA, rheumatoid factor, syphilis antibody that were negative.  She did have an MRI brain with and without obtained in 2015 that showed severe bilateral cerebral white matter changes which were nonspecific. She had an LP in 2014, appears this was unremarkable although I am unable to see the full panel (WBC 2 glucose 98).  NMDA was also sent which was negative.  Appears she was supposed to have several other serum lab test and repeat MRIs however she no showed to several appointments. She never had any of her seizure spells identified on a routine EEG therefore they recommended an EMU stay and for her to continue her zonisamide 300 mg nightly.  Appears she never set up the appointment for the epilepsy monitoring unit.  I am not sure when the Keppra was initiated.     Diagnosis: Acute metabolic encephalopathy, seizure-like activity, polysubstance abuse, closed fracture of proximal end of right humerus, abnormal MRI brain with severe bilateral white matter changes    Work up to date:  1/17 CTH WO: Excessive motion limiting imaging.  1/17 repeat CTH WO: No acute intracranial abnormalities, bilateral confluent areas of decreased attenuation within the white matter, nonspecific, similar appearance on last MRI brain in 2015.  Labs: Mag 1.8, acetaminophen <5.0, salicylate <0.3, U/A-, UDS + opiates, ethanol levels <10, COVID-19 negative    Plan:  Reviewed several records from [place], appears she has longstanding hx of drug abuse and being non-compliant with her AEDs. I'm not sure that her current presentation is seizure related. On exam she is compulsive, confused, and picking at her gown and tubing.  CT revealed severe bilateral white matter changes, which appears similar to prior MRI brain IN 2015. I am going to have Dr. Agosto review this  today. She has reports of cognitive decline and memory loss dating back to 2018 from her [place] records.  I see no record of Lupus w/u which can cause intermittent psychosis.  Will check ammonia and TSH level  Continue Keppra for now.  With her current mental status, I'm not sure that she will be able to complete EEG.   Neurochecks q4  Therapies as written. Call RRT for any acute neurological changes. We will continue to follow and advise.    Case discussed with patient and Dr. Agosto, and she agrees with plan above.   KACIE Garza

## 2021-01-18 NOTE — PROGRESS NOTES
Name: Mary Chavez ADMIT: 2021   : 1970  PCP: Murphy Carty MD    MRN: [id number] LOS: 0 days   AGE/SEX: 50 y.o. female  ROOM: [facility]     Subjective   Subjective   CC: altered mental status  Patient has been confused and hallucinating overnight and today. She denies new complaints but history is unreliable.     Objective   Objective   Vital Signs  Temp:  [97.8 °F (36.6 °C)-98.5 °F (36.9 °C)] 98.5 °F (36.9 °C)  Heart Rate:  [] 98  Resp:  [16] 16  BP: (120-127)/(77-87) 121/87  SpO2:  [92 %-100 %] 92 %  on   ;   Device (Oxygen Therapy): room air  Body mass index is 19.69 kg/m².  Physical Exam  Vitals signs and nursing note reviewed.   Constitutional:       General: She is not in acute distress.     Appearance: She is not toxic-appearing.      Comments: Chronically ill appearing, appears older than stated age   HENT:      Head: Normocephalic and atraumatic.      Nose: Nose normal.      Mouth/Throat:      Mouth: Mucous membranes are moist.      Pharynx: Oropharynx is clear.      Comments: Poor dentition  Eyes:      Conjunctiva/sclera: Conjunctivae normal.      Pupils: Pupils are equal, round, and reactive to light.   Neck:      Musculoskeletal: Normal range of motion and neck supple.   Cardiovascular:      Rate and Rhythm: Normal rate and regular rhythm.      Pulses: Normal pulses.   Pulmonary:      Effort: Pulmonary effort is normal.      Breath sounds: Normal breath sounds. No wheezing or rales.   Abdominal:      General: Bowel sounds are normal.      Palpations: Abdomen is soft.      Tenderness: There is no abdominal tenderness.   Musculoskeletal:         General: Tenderness (right upper extremity) present. No swelling.      Comments: RUE in sling, neurovascularly intact   Skin:     General: Skin is warm and dry.      Capillary Refill: Capillary refill takes less than 2 seconds.   Neurological:      General: No focal deficit present.      Mental Status: She is alert. She is disoriented.       Motor: No weakness.   Psychiatric:         Attention and Perception: She is inattentive.         Mood and Affect: Mood is anxious.         Cognition and Memory: Cognition is impaired.     Results Review     I reviewed the patient's new clinical results.  I reviewed the patient's telemetry.   Results from last 7 days   Lab Units 01/18/21  0623 01/17/21  0943   WBC 10*3/mm3 7.36 10.95*   HEMOGLOBIN g/dL 9.9* 12.2   PLATELETS 10*3/mm3 209 273     Results from last 7 days   Lab Units 01/18/21  0623 01/17/21  0943   SODIUM mmol/L 143 141   POTASSIUM mmol/L 3.3* 3.4*   CHLORIDE mmol/L 113* 106   CO2 mmol/L 21.4* 26.0   BUN mg/dL 16 19   CREATININE mg/dL 0.50* 0.84   GLUCOSE mg/dL 97 163*   Estimated Creatinine Clearance: 116.2 mL/min (A) (by C-G formula based on SCr of 0.5 mg/dL (L)).  Results from last 7 days   Lab Units 01/17/21  0943   ALBUMIN g/dL 3.00*   BILIRUBIN mg/dL 0.9   ALK PHOS U/L 164*   AST (SGOT) U/L 31   ALT (SGPT) U/L 17     Results from last 7 days   Lab Units 01/18/21  0623 01/17/21  0943   CALCIUM mg/dL 8.2* 8.7   ALBUMIN g/dL  --  3.00*   MAGNESIUM mg/dL 1.8 1.8       COVID19   Date Value Ref Range Status   01/17/2021 Not Detected Not Detected - Ref. Range Final   07/25/2020 Not Detected Not Detected - Ref. Range Final     Glucose   Date/Time Value Ref Range Status   01/17/2021 1529 96 70 - 130 mg/dL Final       CT Head Without Contrast  Narrative: CT HEAD WITHOUT CONTRAST     HISTORY: Altered mental status, seizure.     COMPARISON: CT head 01/17/2021.     FINDINGS: The brain ventricles are symmetrical. Areas of decreased  attenuation are noted involving the white matter cerebral hemispheres  bilaterally similar in appearance as compared to the prior examination,  nonspecific. This is somewhat prominent for a patient of 50 years of  age. There is no evidence of intracranial hemorrhage, hydrocephalus or  of a focal area of decreased attenuation to suggest acute infarction.  Further evaluation could  be performed with a MRI examination of brain as  indicated.     Impression: No evidence of intracranial hemorrhage, hydrocephalus or of  abnormal extra-axial fluid. Confluent areas of decreased attenuation are  noted involving the white matter similar as bilaterally, nonspecific.  Further evaluation could be performed with MRI examination the brain  with and without contrast.     The above information was called to and discussed with Dr. Gillespie.     Radiation dose reduction techniques were utilized, including automated  exposure control and exposure modulation based on body size.     This report was finalized on 1/18/2021 8:48 AM by Dr. Keith Vines M.D.       Scheduled Medications  levETIRAcetam, 1,000 mg, Intravenous, Q12H  nicotine, 1 patch, Transdermal, Q24H  sodium chloride, 10 mL, Intravenous, Q12H    Infusions  sodium chloride, 125 mL/hr, Last Rate: 125 mL/hr (01/18/21 1435)    Diet  NPO Diet       Assessment/Plan     Active Hospital Problems    Diagnosis  POA   • Polysubstance abuse (CMS/HCC) [F19.10]  Yes   • Opioid use disorder (CMS/HCC) [F11.99]  Yes   • Acute psychosis (CMS/HCC) [F23]  Yes   • Acute metabolic encephalopathy [G93.41]  Yes   • Fracture of right humerus [S42.301A]  Yes   • Chronic pain syndrome [G89.4]  Yes   • Seizures (CMS/HCC) [R56.9]  Yes      Resolved Hospital Problems   No resolved problems to display.   Altered Mental Status  - she has a long history of polysubstance abuse and non-compliance with AEDs; breakthrough seizure is possible but presentation does not sound consistent with this  - patient's  apparently supplied her with street narcotics prior to admission-her UDS is positive for opiates-she does not appear to be in withdrawal but I do suspect that her current presentation is the result of substance abuse  - CT head showing nothing acute  - TSH and ammonia are normal, will check B12  - she does not appear to have any sort of infection  - continue redirection, PRN  zyprexa   - EEG pending  - appreciate neurology and psychiatry recommendations    Right Humerus Fracture  - continue immobilization and pain control  - appreciate orthopedic surgery evaluation-conservative management planned for now      SCDs for DVT prophylaxis.  Full code.  Discussed with patient and nursing staff..  Anticipate discharge TBD.  timing yet to be determined.    I attempted to call the patient's , Maxx Chavez, at [phone] to update him on the patient's condition and he did not answer. I did not leave a message. I will attempt to call again tomorrow.    Mason Ortega MD  [facility]  01/18/21  17:24 EST    I wore protective equipment throughout this patient encounter including a face mask, gloves and protective eyewear.  Hand hygiene was performed before donning protective equipment and after removal when leaving the room.

## 2021-01-19 LAB
ANION GAP SERPL CALCULATED.3IONS-SCNC: 11.8 MMOL/L (ref 5–15)
BASOPHILS # BLD AUTO: 0.02 10*3/MM3 (ref 0–0.2)
BASOPHILS NFR BLD AUTO: 0.2 % (ref 0–1.5)
BUN SERPL-MCNC: 6 MG/DL (ref 6–20)
BUN/CREAT SERPL: 15.8 (ref 7–25)
CALCIUM SPEC-SCNC: 7.6 MG/DL (ref 8.6–10.5)
CHLORIDE SERPL-SCNC: 110 MMOL/L (ref 98–107)
CO2 SERPL-SCNC: 16.2 MMOL/L (ref 22–29)
CREAT SERPL-MCNC: 0.38 MG/DL (ref 0.57–1)
CRP SERPL-MCNC: 12.45 MG/DL (ref 0–0.5)
DEPRECATED RDW RBC AUTO: 40.4 FL (ref 37–54)
EOSINOPHIL # BLD AUTO: 0.01 10*3/MM3 (ref 0–0.4)
EOSINOPHIL NFR BLD AUTO: 0.1 % (ref 0.3–6.2)
ERYTHROCYTE [DISTWIDTH] IN BLOOD BY AUTOMATED COUNT: 12.2 % (ref 12.3–15.4)
GFR SERPL CREATININE-BSD FRML MDRD: >150 ML/MIN/1.73
GLUCOSE SERPL-MCNC: 71 MG/DL (ref 65–99)
HCT VFR BLD AUTO: 28.9 % (ref 34–46.6)
HGB BLD-MCNC: 9.5 G/DL (ref 12–15.9)
IMM GRANULOCYTES # BLD AUTO: 0.03 10*3/MM3 (ref 0–0.05)
IMM GRANULOCYTES NFR BLD AUTO: 0.4 % (ref 0–0.5)
LYMPHOCYTES # BLD AUTO: 0.74 10*3/MM3 (ref 0.7–3.1)
LYMPHOCYTES NFR BLD AUTO: 8.7 % (ref 19.6–45.3)
MCH RBC QN AUTO: 30.3 PG (ref 26.6–33)
MCHC RBC AUTO-ENTMCNC: 32.9 G/DL (ref 31.5–35.7)
MCV RBC AUTO: 92 FL (ref 79–97)
MONOCYTES # BLD AUTO: 0.53 10*3/MM3 (ref 0.1–0.9)
MONOCYTES NFR BLD AUTO: 6.2 % (ref 5–12)
NEUTROPHILS NFR BLD AUTO: 7.19 10*3/MM3 (ref 1.7–7)
NEUTROPHILS NFR BLD AUTO: 84.4 % (ref 42.7–76)
NRBC BLD AUTO-RTO: 0.1 /100 WBC (ref 0–0.2)
PLATELET # BLD AUTO: 232 10*3/MM3 (ref 140–450)
PMV BLD AUTO: 10.6 FL (ref 6–12)
POTASSIUM SERPL-SCNC: 2.7 MMOL/L (ref 3.5–5.2)
RBC # BLD AUTO: 3.14 10*6/MM3 (ref 3.77–5.28)
SODIUM SERPL-SCNC: 138 MMOL/L (ref 136–145)
WBC # BLD AUTO: 8.52 10*3/MM3 (ref 3.4–10.8)

## 2021-01-19 PROCEDURE — 80048 BASIC METABOLIC PNL TOTAL CA: CPT | Performed by: INTERNAL MEDICINE

## 2021-01-19 PROCEDURE — 99232 SBSQ HOSP IP/OBS MODERATE 35: CPT | Performed by: NURSE PRACTITIONER

## 2021-01-19 PROCEDURE — 25010000003 LEVETIRACETAM IN NACL 0.75% 1000 MG/100ML SOLUTION: Performed by: PSYCHIATRY & NEUROLOGY

## 2021-01-19 PROCEDURE — 92610 EVALUATE SWALLOWING FUNCTION: CPT

## 2021-01-19 PROCEDURE — 85025 COMPLETE CBC W/AUTO DIFF WBC: CPT | Performed by: INTERNAL MEDICINE

## 2021-01-19 RX ORDER — SODIUM CHLORIDE, SODIUM LACTATE, POTASSIUM CHLORIDE, CALCIUM CHLORIDE 600; 310; 30; 20 MG/100ML; MG/100ML; MG/100ML; MG/100ML
125 INJECTION, SOLUTION INTRAVENOUS CONTINUOUS
Status: DISCONTINUED | OUTPATIENT
Start: 2021-01-19 | End: 2021-01-21

## 2021-01-19 RX ADMIN — LEVETIRACETAM 1000 MG: 10 INJECTION INTRAVENOUS at 20:35

## 2021-01-19 RX ADMIN — LEVETIRACETAM 1000 MG: 10 INJECTION INTRAVENOUS at 09:38

## 2021-01-19 RX ADMIN — SODIUM CHLORIDE, PRESERVATIVE FREE 10 ML: 5 INJECTION INTRAVENOUS at 09:20

## 2021-01-19 RX ADMIN — POTASSIUM CHLORIDE 40 MEQ: 1.5 POWDER, FOR SOLUTION ORAL at 21:34

## 2021-01-19 RX ADMIN — SODIUM CHLORIDE, POTASSIUM CHLORIDE, SODIUM LACTATE AND CALCIUM CHLORIDE 125 ML/HR: 600; 310; 30; 20 INJECTION, SOLUTION INTRAVENOUS at 15:14

## 2021-01-19 RX ADMIN — Medication 1 PATCH: at 09:20

## 2021-01-19 NOTE — PLAN OF CARE
Goal Outcome Evaluation:  Plan of Care Reviewed With: patient  Progress: improving  Outcome Summary: remains confused, hallucinating with restlessness. bm x2. urinating well on own. k+ replaced. vss. will continue to monitor.

## 2021-01-19 NOTE — PROGRESS NOTES
"Continued Stay Note  [facility]     Patient Name: Mary Chavez  MRN: [id number]  Today's Date: 1/19/2021    Admit Date: 1/17/2021    Discharge Plan     Row Name 01/19/21 1606       Plan    Plan  Plans are home per spouse    Plan Comments  Pt is confused and unable to speak with CCP.  CCP spoke with spouse Maxx [phone]) by phone.  He states they were living with another couple, but they have moved, and now they live \"couch to couch\" of friends homes and relatives.  He says pt usually returns home after hospital stays, CCP will follow to assist if needed.  Access is also following pt. Melissa Jamil RN        Discharge Codes    No documentation.             Melissa Jamil RN    "

## 2021-01-19 NOTE — PLAN OF CARE
Goal Outcome Evaluation:  Plan of Care Reviewed With: patient  Progress: improving  Outcome Summary: Pt seen for clinical swallow assessment. Pt confused, unable to follow directions. Dentition poor. No overt s/s of aspiraiton with thins via cup or straw, puree, or mixed mech soft. Difficulty biting cracker and masticating regular solids this date. No oral residue post swallow. SLP recs mech soft and thins, straws okay. Assist with meals. Meds whole as cody with thins or puree.

## 2021-01-19 NOTE — PROGRESS NOTES
DOS: 2021  NAME: Mary Chavez   : 1970  PCP: Murphy Carty MD    Chief Complaint   Patient presents with   • Seizures        Subjective: No acute events overnight.  She did not sleep much last night.  Still having visual hallucinations and remains delirious.  She was unable to complete EEG yesterday due to restlessness.  More alert today.    Objective:  Vital signs:   Vitals:    21 2314 21 0554 21 0717 21 1240   BP: 95/73  123/96 118/98   BP Location: Right arm  Left arm Left arm   Patient Position: Lying  Lying Lying   Pulse: 94  99 100   Resp: 16  16 16   Temp: 99.1 °F (37.3 °C)  98.6 °F (37 °C) 97.3 °F (36.3 °C)   TempSrc: Oral  Oral Oral   SpO2: 96%  98% 100%   Weight:  55 kg (121 lb 4.1 oz)     Height:           Current Facility-Administered Medications:   •  acetaminophen (TYLENOL) tablet 650 mg, 650 mg, Oral, Q4H PRN **OR** acetaminophen (TYLENOL) 160 MG/5ML solution 650 mg, 650 mg, Oral, Q4H PRN **OR** acetaminophen (TYLENOL) suppository 650 mg, 650 mg, Rectal, Q4H PRN, Flaca Aguilar APRN  •  [] cloNIDine (CATAPRES) tablet 0.1 mg, 0.1 mg, Oral, 4x Daily PRN **FOLLOWED BY** [] cloNIDine (CATAPRES) tablet 0.1 mg, 0.1 mg, Oral, 4x Daily PRN **FOLLOWED BY** cloNIDine (CATAPRES) tablet 0.1 mg, 0.1 mg, Oral, TID PRN **FOLLOWED BY** [START ON 2021] cloNIDine (CATAPRES) tablet 0.1 mg, 0.1 mg, Oral, BID PRN **FOLLOWED BY** [START ON 2021] cloNIDine (CATAPRES) tablet 0.1 mg, 0.1 mg, Oral, Once PRN, Flaca Aguilar, APRN  •  ipratropium-albuterol (DUO-NEB) nebulizer solution 3 mL, 3 mL, Nebulization, Q4H PRN, Flaca Aguilar, APRN  •  levETIRAcetam in NaCl 0.75% (KEPPRA) IVPB 1,000 mg, 1,000 mg, Intravenous, Q12H, Chad Lance MD, Last Rate: 0 mL/hr at 21 1020, 1,000 mg at 21 0938  •  LORazepam (ATIVAN) injection 1 mg, 1 mg, Intravenous, Q2H PRN, Chad Lance MD, 1 mg at 21 0959  •  nicotine (NICODERM CQ) 14  MG/24HR patch 1 patch, 1 patch, Transdermal, Q24H, Mason Ortega MD, 1 patch at 21 0920  •  nitroglycerin (NITROSTAT) SL tablet 0.4 mg, 0.4 mg, Sublingual, Q5 Min PRN, Flaca Aguilar APRN  •  OLANZapine (zyPREXA) injection 5 mg, 5 mg, Intramuscular, Q8H PRN, Galen Forbes III, MD  •  ondansetron (ZOFRAN) injection 4 mg, 4 mg, Intravenous, Q6H PRN, Flaca Aguilar APRN  •  potassium chloride (K-DUR,KLOR-CON) ER tablet 40 mEq, 40 mEq, Oral, PRN **OR** potassium chloride (KLOR-CON) packet 40 mEq, 40 mEq, Oral, PRN **OR** potassium chloride 10 mEq in 100 mL IVPB, 10 mEq, Intravenous, Q1H PRN, Mason Ortega MD, Last Rate: 100 mL/hr at 21, 10 mEq at 21  •  [COMPLETED] Insert peripheral IV, , , Once **AND** sodium chloride 0.9 % flush 10 mL, 10 mL, Intravenous, PRN, Rafael Gillespie MD, 10 mL at 21 0959  •  sodium chloride 0.9 % flush 10 mL, 10 mL, Intravenous, Q12H, Flaca Aguilar APRN, 10 mL at 21 0920  •  sodium chloride 0.9 % flush 10 mL, 10 mL, Intravenous, PRN, Flaca Aguilar APRN  •  sodium chloride 0.9 % infusion, 125 mL/hr, Intravenous, Continuous, Rafael Gillespie MD, Last Rate: 125 mL/hr at 21, 125 mL/hr at 21    PRN meds  •  acetaminophen **OR** acetaminophen **OR** acetaminophen  •  [] cloNIDine **FOLLOWED BY** [] cloNIDine **FOLLOWED BY** cloNIDine **FOLLOWED BY** [START ON 2021] cloNIDine **FOLLOWED BY** [START ON 2021] cloNIDine  •  ipratropium-albuterol  •  LORazepam  •  nitroglycerin  •  OLANZapine  •  ondansetron  •  potassium chloride **OR** potassium chloride **OR** potassium chloride  •  [COMPLETED] Insert peripheral IV **AND** sodium chloride  •  sodium chloride    No current facility-administered medications on file prior to encounter.      Current Outpatient Medications on File Prior to Encounter   Medication Sig   • albuterol sulfate  (90 Base) MCG/ACT inhaler  Inhale 1-2 puffs Every 4 (Four) Hours As Needed for Wheezing.   • amitriptyline (ELAVIL) 150 MG tablet Take 1 tablet by mouth Every Night. (Patient taking differently: Take 300 mg by mouth Every Night.)   • DULoxetine (CYMBALTA) 30 MG capsule Take 1 capsule by mouth Daily.   • levETIRAcetam (KEPPRA) 1000 MG tablet Take 1 tablet by mouth 2 (Two) Times a Day.   • nicotine (NICODERM CQ) 14 MG/24HR patch Place 1 patch on the skin as directed by provider Daily. Don't use if you continue to smoke   • zonisamide (ZONEGRAN) 100 MG capsule Take 200 mg by mouth 2 (two) times a day.     General appearance: Appears older than stated age, NAD, confused  HEENT: Normocephalic, atraumatic, PERRL, no masses or tenderness  COR: RRR  Resp: Even and unlabored  Extremities: Left hand edema, right arm in sling.  Skin: warm, dry     Neurological:   MS: oriented to self only, remote memory/recall  severely impaired, decreased attention/concentration,  no obvious neglect  CN: visual fields full, PERRL, no obvious facial droop, shoulder shrug equal, tongue midline  Motor: Moving all extremities spontaneously, RUE in sling with minimal movement  Sensory: light touch sensation intact in all 4 ext.  Coordination: Unable to assess due to mental status  Rapid alternating movements:  Unable to assess due to mental status    Physical exam performed, changes noted.    Laboratory results:  Lab Results   Component Value Date    TSH 0.401 01/18/2021     Lab Results   Component Value Date    SSHTWCQG50 332 10/26/2020     Lab Results   Component Value Date    HGBA1C 5.40 07/08/2020     Lab Results   Component Value Date    GLUCOSE 71 01/19/2021    BUN 6 01/19/2021    CREATININE 0.38 (L) 01/19/2021    EGFRIFNONA >150 01/19/2021    BCR 15.8 01/19/2021    K 2.7 (L) 01/19/2021    CO2 16.2 (L) 01/19/2021    CALCIUM 7.6 (L) 01/19/2021    ALBUMIN 3.00 (L) 01/17/2021    LABIL2 1.3 02/23/2020    AST 31 01/17/2021    ALT 17 01/17/2021     Lab Results   Component  Value Date    WBC 8.52 01/19/2021    HGB 9.5 (L) 01/19/2021    HCT 28.9 (L) 01/19/2021    MCV 92.0 01/19/2021     01/19/2021     Brief Urine Lab Results  (Last result in the past 365 days)      Color   Clarity   Blood   Leuk Est   Nitrite   Protein   CREAT   Urine HCG        01/17/21 1021 Dark Yellow Clear Negative Negative Negative Negative             No results found for: ACANTHNAEG, AFBCX, BPERTUSSISCX, BLOODCX  No results found for: BCIDPCR, CXREFLEX, CSFCX, CULTURETIS  No results found for: CULTURES, HSVCX, URCX  No results found for: EYECULTURE, GCCX, HSVCULTURE, LABHSV  No results found for: LEGIONELLA, MRSACX, MUMPSCX, MYCOPLASCX  No results found for: NOCARDIACX, STOOLCX  No results found for: THROATCX, UNSTIMCULT, URINECX, CULTURE, VZVCULTUR  No results found for: VIRALCULTU, WOUNDCX  Pain Management Panel     Pain Management Panel Latest Ref Rng & Units 1/17/2021 10/25/2020    AMPHETAMINES SCREEN, URINE Negative (arb'U) - POSITIVE(A)    BARBITURATES SCREEN Negative Negative Negative    BENZODIAZEPINE SCREEN, URINE Negative Negative Negative    COCAINE SCREEN, URINE Negative Negative Negative    METHADONE SCREEN, URINE Negative Negative -          Review and interpretation of imaging:  Xr Humerus Right    Result Date: 1/17/2021   Right humerus fracture.  This report was finalized on 1/17/2021 9:43 AM by Dr. Tiago Brand M.D.      Ct Head Without Contrast    Result Date: 1/18/2021  No evidence of intracranial hemorrhage, hydrocephalus or of abnormal extra-axial fluid. Confluent areas of decreased attenuation are noted involving the white matter similar as bilaterally, nonspecific. Further evaluation could be performed with MRI examination the brain with and without contrast.  The above information was called to and discussed with Dr. Gillespie.  Radiation dose reduction techniques were utilized, including automated exposure control and exposure modulation based on body size.  This report was  "finalized on 1/18/2021 8:48 AM by Dr. Keith Vines M.D.      Ct Head Without Contrast    Result Date: 1/17/2021   The exam is significantly limited as described. No hydrocephalus or midline shift. Follow-up/further evaluation is suggested as indications persist.  Discussed by telephone with Dr. Gillespie at 0929, 1/17/221.    This report was finalized on 1/17/2021 9:32 AM by Dr. Tiago Brand M.D.           Impression/Assessment:  This is a 50-year-old female with past medical history of polysubstance abuse, current smoker, cognitive impairment, chronic pain syndrome, anxiety, anemia, seizure, who presented to the hospital on 1/17/2021 with complaints of altered mental status and seizure-like activity. Her  stated that she had a seizure last Tuesday, again on Thursday causing her to hurt her right arm, and another on the morning of admission. Reportedly after the last seizure the morning of admission the  reports she had been inconsolable, jumping on tables and screaming and crying. She had similar behavior when she arrived to the ER yelling incoherent phrases and thrashing around in the bed. She received 1000 mg load of Keppra and 3 mg of lorazepam in the ER. BP on arrival 118/96, . EKG poor quality. . Temp 98.3.     Per review of the patient's home med list she is prescribed amitriptyline 150 mg nightly, Cymbalta 30 mg daily, Keppra 1000 mg twice daily, and zonisamide 200 mg twice daily.  reports daily compliance. He also stated that they bought \"pain pills from the street the day before admission\".      I reviewed her care everywhere records, she is followed by [place] and was last seen on 9/22/2020.  In summary it appears that the patient has been noncompliant with her AED and showing up for follow-up appointments.  She has had several ER visits for breakthrough seizures and altered mental status found to be secondary to drug abuse.  She had an EEG in April 2018 that " "revealed mild generalized encephalopathy.  Appears she had several lab test sent at that time for cognitive decline including Lyme antibodies, MMA, rheumatoid factor, syphilis antibody that were negative.  She did have an MRI brain with and without obtained in 2015 that showed severe bilateral cerebral white matter changes which were nonspecific. She had an LP in 2014, appears this was unremarkable although I am unable to see the full panel (WBC 2 glucose 98).  NMDA was also sent which was negative.  Appears she was supposed to have several other serum lab test and repeat MRIs however she no showed to several appointments. She never had any of her seizure spells identified on a routine EEG therefore they recommended an EMU stay and for her to continue her zonisamide 300 mg nightly.  Appears she never set up the appointment for the epilepsy monitoring unit.  I am not sure when the Keppra was initiated.      Diagnosis: Acute metabolic encephalopathy, seizure-like activity, polysubstance abuse, closed fracture of proximal end of right humerus, abnormal MRI brain with severe bilateral white matter changes     Work up to date:  1/17 CTH WO: Excessive motion limiting imaging.  1/17 repeat CTH WO: No acute intracranial abnormalities, bilateral confluent areas of decreased attenuation within the white matter, nonspecific, similar appearance on last MRI brain in 2015.  Labs: Mag 1.8, acetaminophen <5.0, salicylate <0.3, U/A-, UDS + opiates, ethanol levels <10, COVID-19 negative, ammonia 34, TSH 0.401    Plan:  Mental status remains the same today, she is delirious and having visual hallucinations. She is more alert today.  I did have a long discussion with the  via telephone today.  He reports the patient was in a 28-day coma here at [facility] in 2014 and was told that the white matter of her brain had \"took over\" and there was nothing further to do.  From my review of a discharge summary at that time it appears " "the working diagnosis was neuroleptic malignant syndrome and she was given dantrolene but had no improvement with her encephalopathy or posturing.  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 LP was repeated x2 and was unremarkable per documentation. She was transferred to a nursing home with a tracheostomy but this was pulled out prior to her discharge.  He reports longstanding hx of \"pain pill\", crack, and heroin abuse.  The patient was positive for methamphetamines back in October but he reports he did not know about that.  They still abuse pain pills and heroin currently.  Last use of heroin was last week and pain pills the day before her admission.  Over the last month he has noticed that the patient has not been making sense, cannot read or write like she usually does, and has had a significant weight loss going from 180 pounds to 115 pounds.  On admission here she was 121lbs. He reports compliance with her seizure medications.  Again I saw no record of a lupus work-up which can cause seizures and intermittent psychosis.  We will go ahead and send that work-up today.  I did discuss with the  that this can take several days to result.  Her ammonia and TSH levels were normal.  Will reattempt EEG today or in the morning. If EEG unremarkable for etiology then will consider LP if her mental status does not improve.   Therapies as written. Call RRT for any acute neurological changes. We will continue to follow and advise.      Case discussed with patient, RN,  via telephone, Dr. Ortega, and Dr. Agosto, and she agrees with plan above.   KACIE Garza    "
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
## 2021-01-19 NOTE — PLAN OF CARE
Goal Outcome Evaluation:  Plan of Care Reviewed With: patient  Progress: improving    Pt remains confused & hallucinating, constant restlessness, Ativan admin x 1, EEG unable to be completed, access to be reconsulted when pt more appropriate, ST not yet appropriate to eval pt, in/out cath x 1, pt then later had incontinence episode, COWS 6, K+ protocol rec'd 4 doses recheck to be done at 2100, CTM

## 2021-01-19 NOTE — PROGRESS NOTES
Name: Mary Chavez ADMIT: 2021   : 1970  PCP: Murphy Carty MD    MRN: [id number] LOS: 0 days   AGE/SEX: 50 y.o. female  ROOM: [facility]     Subjective   Subjective   CC: altered mental status  No acute events. Patient remains confused and is unable to provide a reliable history.    Objective   Objective   Vital Signs  Temp:  [97.3 °F (36.3 °C)-99.1 °F (37.3 °C)] 97.3 °F (36.3 °C)  Heart Rate:  [] 100  Resp:  [16] 16  BP: ()/() 118/98  SpO2:  [96 %-100 %] 100 %  on   ;   Device (Oxygen Therapy): room air  Body mass index is 19.58 kg/m².  Physical Exam  Vitals signs and nursing note reviewed.   Constitutional:       General: She is not in acute distress.     Appearance: She is not toxic-appearing.      Comments: Chronically ill appearing, appears older than stated age   HENT:      Head: Normocephalic and atraumatic.      Nose: Nose normal.      Mouth/Throat:      Mouth: Mucous membranes are moist.      Pharynx: Oropharynx is clear.      Comments: Poor dentition  Eyes:      Conjunctiva/sclera: Conjunctivae normal.      Pupils: Pupils are equal, round, and reactive to light.   Neck:      Musculoskeletal: Normal range of motion and neck supple.   Cardiovascular:      Rate and Rhythm: Normal rate and regular rhythm.      Pulses: Normal pulses.   Pulmonary:      Effort: Pulmonary effort is normal.      Breath sounds: Normal breath sounds. No wheezing or rales.   Abdominal:      General: Bowel sounds are normal.      Palpations: Abdomen is soft.      Tenderness: There is no abdominal tenderness.   Musculoskeletal:         General: Tenderness (right upper extremity) present. No swelling.      Comments: RUE in sling, neurovascularly intact   Skin:     General: Skin is warm and dry.      Capillary Refill: Capillary refill takes less than 2 seconds.   Neurological:      General: No focal deficit present.      Mental Status: She is alert. She is disoriented.      Motor: No weakness.      Psychiatric:         Attention and Perception: She is inattentive.         Mood and Affect: Mood is anxious.         Cognition and Memory: Cognition is impaired.     Results Review     I reviewed the patient's new clinical results.  I reviewed the patient's telemetry.   Results from last 7 days   Lab Units 01/19/21  1219 01/18/21  0623 01/17/21  0943   WBC 10*3/mm3 8.52 7.36 10.95*   HEMOGLOBIN g/dL 9.5* 9.9* 12.2   PLATELETS 10*3/mm3 232 209 273     Results from last 7 days   Lab Units 01/19/21  1219 01/18/21  2104 01/18/21  0623 01/17/21  0943   SODIUM mmol/L 138  --  143 141   POTASSIUM mmol/L 2.7* 3.7 3.3* 3.4*   CHLORIDE mmol/L 110*  --  113* 106   CO2 mmol/L 16.2*  --  21.4* 26.0   BUN mg/dL 6  --  16 19   CREATININE mg/dL 0.38*  --  0.50* 0.84   GLUCOSE mg/dL 71  --  97 163*   Estimated Creatinine Clearance: 152.1 mL/min (A) (by C-G formula based on SCr of 0.38 mg/dL (L)).  Results from last 7 days   Lab Units 01/17/21  0943   ALBUMIN g/dL 3.00*   BILIRUBIN mg/dL 0.9   ALK PHOS U/L 164*   AST (SGOT) U/L 31   ALT (SGPT) U/L 17     Results from last 7 days   Lab Units 01/19/21  1219 01/18/21  0623 01/17/21  0943   CALCIUM mg/dL 7.6* 8.2* 8.7   ALBUMIN g/dL  --   --  3.00*   MAGNESIUM mg/dL  --  1.8 1.8       COVID19   Date Value Ref Range Status   01/17/2021 Not Detected Not Detected - Ref. Range Final   07/25/2020 Not Detected Not Detected - Ref. Range Final     Glucose   Date/Time Value Ref Range Status   01/17/2021 1529 96 70 - 130 mg/dL Final       CT Head Without Contrast  Narrative: CT HEAD WITHOUT CONTRAST     HISTORY: Altered mental status, seizure.     COMPARISON: CT head 01/17/2021.     FINDINGS: The brain ventricles are symmetrical. Areas of decreased  attenuation are noted involving the white matter cerebral hemispheres  bilaterally similar in appearance as compared to the prior examination,  nonspecific. This is somewhat prominent for a patient of 50 years of  age. There is no evidence of  intracranial hemorrhage, hydrocephalus or  of a focal area of decreased attenuation to suggest acute infarction.  Further evaluation could be performed with a MRI examination of brain as  indicated.     Impression: No evidence of intracranial hemorrhage, hydrocephalus or of  abnormal extra-axial fluid. Confluent areas of decreased attenuation are  noted involving the white matter similar as bilaterally, nonspecific.  Further evaluation could be performed with MRI examination the brain  with and without contrast.     The above information was called to and discussed with Dr. Gillespie.     Radiation dose reduction techniques were utilized, including automated  exposure control and exposure modulation based on body size.     This report was finalized on 1/18/2021 8:48 AM by Dr. Keith Vines M.D.       Scheduled Medications  levETIRAcetam, 1,000 mg, Intravenous, Q12H  nicotine, 1 patch, Transdermal, Q24H  sodium chloride, 10 mL, Intravenous, Q12H    Infusions  lactated ringers, 125 mL/hr    Diet  Diet Dysphagia; IV - Mechanical Soft No Mixed Consistencies; Thin       Assessment/Plan     Active Hospital Problems    Diagnosis  POA   • Polysubstance abuse (CMS/HCC) [F19.10]  Yes   • Opioid use disorder (CMS/HCC) [F11.99]  Yes   • Acute psychosis (CMS/HCC) [F23]  Yes   • Acute metabolic encephalopathy [G93.41]  Yes   • Fracture of right humerus [S42.301A]  Yes   • Hypokalemia [E87.6]  Yes   • Chronic pain syndrome [G89.4]  Yes   • Seizures (CMS/HCC) [R56.9]  Yes      Resolved Hospital Problems   No resolved problems to display.   Altered Mental Status  - she has a long history of polysubstance abuse and non-compliance with AEDs; breakthrough seizure is possible but presentation does not sound consistent with this  - patient's  apparently supplied her with street narcotics prior to admission-her UDS is positive for opiates-she does not appear to be in withdrawal but I do suspect that her current presentation is the  result of substance abuse  - CT head showing nothing acute  - TSH and ammonia are normal, B12 pending  - she does not appear to have any sort of infection  - continue redirection, PRN zyprexa   - EEG pending  - lupus workup pending; inflammatory markers are elevated  - continue Q4H neuro checks  - appreciate neurology and psychiatry recommendations; d/w KACIE Garza who was able to get ahold of her  who stated that she has had some nonsensical speech and has been having trouble reading/writing for the past month or so along with weight loss    Right Humerus Fracture  - continue immobilization and pain control  - appreciate orthopedic surgery evaluation-conservative management planned for now    Hypokalemia  - replace per protocol     NAGMA  - switch IVF to lactated ringers  - hopefully will improve with oral intake      SCDs for DVT prophylaxis.  Full code.  Discussed with patient and nursing staff..  Anticipate discharge TBD.  timing yet to be determined.      Mason Ortega MD  [facility]  01/19/21  14:46 EST    I wore protective equipment throughout this patient encounter including a face mask, gloves and protective eyewear.  Hand hygiene was performed before donning protective equipment and after removal when leaving the room.

## 2021-01-19 NOTE — PAYOR COMM NOTE
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 Intravenous Given 1/17/21 0946)   sodium chloride 0.9 % bolus 500 mL (0 mL Intravenous Stopped 1/17/21 1308)       Imaging results:  Xr Humerus Right    Result Date: 1/17/2021   Right humerus fracture.  This report was finalized on 1/17/2021 9:43 AM by Dr. Tiago Brand M.D.      Ct Head Without Contrast    Result Date: 1/17/2021   The exam is significantly limited as described.  No hydrocephalus or midline shift. Follow-up/further evaluation is suggested as indications persist.  Discussed by telephone with Dr. Gillespie at 0929, 1/17/221.    This report was finalized on 1/17/2021 9:32 AM by Dr. Tiago Brand M.D.        Ambulatory status:   -Up with assistance      Social issues:   Social History     Socioeconomic History   • Marital status:      Spouse name: Not on file   • Number of children: Not on file   • Years of education: Not on file   • Highest education level: Not on file   Tobacco Use   • Smoking status: Current Every Day Smoker     Packs/day: 1.50     Types: Cigarettes   Substance and Sexual Activity   • Alcohol use: No   • Drug use: Yes     Types: Marijuana     Comment: heroin snorted   • Sexual activity: Defer    Nursing report ED to floor       Che Sarkar RN  01/17/21 1358      Electronically signed by Che Sarkar RN at 01/17/21 1358     Che Sarkra RN at 01/17/21 1350        Attempt report 5S x1     Che Sarkar RN  01/17/21 1359      Electronically signed by Che Sarkar RN at 01/17/21 1350

## 2021-01-20 ENCOUNTER — TELEPHONE (OUTPATIENT)
Dept: SLEEP MEDICINE | Facility: HOSPITAL | Age: 51
End: 2021-01-20

## 2021-01-20 PROBLEM — E53.8 B12 DEFICIENCY: Status: ACTIVE | Noted: 2021-01-20

## 2021-01-20 LAB
ANION GAP SERPL CALCULATED.3IONS-SCNC: 10.1 MMOL/L (ref 5–15)
BASOPHILS # BLD AUTO: 0.02 10*3/MM3 (ref 0–0.2)
BASOPHILS NFR BLD AUTO: 0.2 % (ref 0–1.5)
BUN SERPL-MCNC: 6 MG/DL (ref 6–20)
BUN/CREAT SERPL: 16.7 (ref 7–25)
CALCIUM SPEC-SCNC: 8.3 MG/DL (ref 8.6–10.5)
CHLORIDE SERPL-SCNC: 108 MMOL/L (ref 98–107)
CO2 SERPL-SCNC: 19.9 MMOL/L (ref 22–29)
CREAT SERPL-MCNC: 0.36 MG/DL (ref 0.57–1)
DEPRECATED RDW RBC AUTO: 39.8 FL (ref 37–54)
EOSINOPHIL # BLD AUTO: 0.01 10*3/MM3 (ref 0–0.4)
EOSINOPHIL NFR BLD AUTO: 0.1 % (ref 0.3–6.2)
ERYTHROCYTE [DISTWIDTH] IN BLOOD BY AUTOMATED COUNT: 12.2 % (ref 12.3–15.4)
ERYTHROCYTE [SEDIMENTATION RATE] IN BLOOD: 32 MM/HR (ref 0–30)
GFR SERPL CREATININE-BSD FRML MDRD: >150 ML/MIN/1.73
GLUCOSE SERPL-MCNC: 79 MG/DL (ref 65–99)
HCT VFR BLD AUTO: 34.1 % (ref 34–46.6)
HGB BLD-MCNC: 11.7 G/DL (ref 12–15.9)
HIV1+2 AB SER QL: NORMAL
LYMPHOCYTES # BLD AUTO: 0.92 10*3/MM3 (ref 0.7–3.1)
LYMPHOCYTES NFR BLD AUTO: 10.3 % (ref 19.6–45.3)
MAGNESIUM SERPL-MCNC: 1.8 MG/DL (ref 1.6–2.6)
MCH RBC QN AUTO: 30.6 PG (ref 26.6–33)
MCHC RBC AUTO-ENTMCNC: 34.3 G/DL (ref 31.5–35.7)
MCV RBC AUTO: 89.3 FL (ref 79–97)
MONOCYTES # BLD AUTO: 0.69 10*3/MM3 (ref 0.1–0.9)
MONOCYTES NFR BLD AUTO: 7.8 % (ref 5–12)
NEUTROPHILS NFR BLD AUTO: 7.21 10*3/MM3 (ref 1.7–7)
NEUTROPHILS NFR BLD AUTO: 81.2 % (ref 42.7–76)
PLAT MORPH BLD: NORMAL
PLATELET # BLD AUTO: 274 10*3/MM3 (ref 140–450)
PMV BLD AUTO: 9.3 FL (ref 6–12)
POTASSIUM SERPL-SCNC: 4 MMOL/L (ref 3.5–5.2)
RBC # BLD AUTO: 3.82 10*6/MM3 (ref 3.77–5.28)
RBC MORPH BLD: NORMAL
RPR SER QL: NORMAL
SODIUM SERPL-SCNC: 138 MMOL/L (ref 136–145)
VIT B12 BLD-MCNC: 267 PG/ML (ref 211–946)
WBC # BLD AUTO: 8.89 10*3/MM3 (ref 3.4–10.8)
WBC MORPH BLD: NORMAL

## 2021-01-20 PROCEDURE — 86235 NUCLEAR ANTIGEN ANTIBODY: CPT | Performed by: NURSE PRACTITIONER

## 2021-01-20 PROCEDURE — 85730 THROMBOPLASTIN TIME PARTIAL: CPT | Performed by: NURSE PRACTITIONER

## 2021-01-20 PROCEDURE — 86147 CARDIOLIPIN ANTIBODY EA IG: CPT | Performed by: NURSE PRACTITIONER

## 2021-01-20 PROCEDURE — 85670 THROMBIN TIME PLASMA: CPT | Performed by: NURSE PRACTITIONER

## 2021-01-20 PROCEDURE — 85610 PROTHROMBIN TIME: CPT | Performed by: NURSE PRACTITIONER

## 2021-01-20 PROCEDURE — 85007 BL SMEAR W/DIFF WBC COUNT: CPT | Performed by: INTERNAL MEDICINE

## 2021-01-20 PROCEDURE — 85598 HEXAGNAL PHOSPH PLTLT NEUTRL: CPT | Performed by: NURSE PRACTITIONER

## 2021-01-20 PROCEDURE — 85652 RBC SED RATE AUTOMATED: CPT | Performed by: INTERNAL MEDICINE

## 2021-01-20 PROCEDURE — 25010000003 LEVETIRACETAM IN NACL 0.75% 1000 MG/100ML SOLUTION: Performed by: PSYCHIATRY & NEUROLOGY

## 2021-01-20 PROCEDURE — 86225 DNA ANTIBODY NATIVE: CPT | Performed by: NURSE PRACTITIONER

## 2021-01-20 PROCEDURE — 99233 SBSQ HOSP IP/OBS HIGH 50: CPT | Performed by: NURSE PRACTITIONER

## 2021-01-20 PROCEDURE — 25010000002 LORAZEPAM PER 2 MG: Performed by: PSYCHIATRY & NEUROLOGY

## 2021-01-20 PROCEDURE — 82607 VITAMIN B-12: CPT | Performed by: INTERNAL MEDICINE

## 2021-01-20 PROCEDURE — G0432 EIA HIV-1/HIV-2 SCREEN: HCPCS | Performed by: INTERNAL MEDICINE

## 2021-01-20 PROCEDURE — 83735 ASSAY OF MAGNESIUM: CPT | Performed by: INTERNAL MEDICINE

## 2021-01-20 PROCEDURE — 25010000002 CYANOCOBALAMIN PER 1000 MCG: Performed by: INTERNAL MEDICINE

## 2021-01-20 PROCEDURE — 85732 THROMBOPLASTIN TIME PARTIAL: CPT | Performed by: NURSE PRACTITIONER

## 2021-01-20 PROCEDURE — 80048 BASIC METABOLIC PNL TOTAL CA: CPT | Performed by: INTERNAL MEDICINE

## 2021-01-20 PROCEDURE — 85613 RUSSELL VIPER VENOM DILUTED: CPT | Performed by: NURSE PRACTITIONER

## 2021-01-20 PROCEDURE — 85025 COMPLETE CBC W/AUTO DIFF WBC: CPT | Performed by: INTERNAL MEDICINE

## 2021-01-20 PROCEDURE — 86592 SYPHILIS TEST NON-TREP QUAL: CPT | Performed by: INTERNAL MEDICINE

## 2021-01-20 PROCEDURE — 86146 BETA-2 GLYCOPROTEIN ANTIBODY: CPT | Performed by: NURSE PRACTITIONER

## 2021-01-20 RX ORDER — CYANOCOBALAMIN 1000 UG/ML
1000 INJECTION, SOLUTION INTRAMUSCULAR; SUBCUTANEOUS DAILY
Status: COMPLETED | OUTPATIENT
Start: 2021-01-20 | End: 2021-01-21

## 2021-01-20 RX ORDER — TEMAZEPAM 15 MG/1
30 CAPSULE ORAL ONCE
Status: DISCONTINUED | OUTPATIENT
Start: 2021-01-20 | End: 2021-01-25 | Stop reason: HOSPADM

## 2021-01-20 RX ADMIN — SODIUM CHLORIDE, POTASSIUM CHLORIDE, SODIUM LACTATE AND CALCIUM CHLORIDE 125 ML/HR: 600; 310; 30; 20 INJECTION, SOLUTION INTRAVENOUS at 19:27

## 2021-01-20 RX ADMIN — LORAZEPAM 1 MG: 2 INJECTION INTRAMUSCULAR; INTRAVENOUS at 11:10

## 2021-01-20 RX ADMIN — LEVETIRACETAM 1000 MG: 10 INJECTION INTRAVENOUS at 20:49

## 2021-01-20 RX ADMIN — POTASSIUM CHLORIDE 40 MEQ: 1.5 POWDER, FOR SOLUTION ORAL at 01:17

## 2021-01-20 RX ADMIN — CYANOCOBALAMIN 1000 MCG: 1000 INJECTION INTRAMUSCULAR; SUBCUTANEOUS at 11:10

## 2021-01-20 RX ADMIN — ACETAMINOPHEN ORAL SOLUTION 650 MG: 325 SOLUTION ORAL at 08:24

## 2021-01-20 RX ADMIN — LEVETIRACETAM 1000 MG: 10 INJECTION INTRAVENOUS at 10:44

## 2021-01-20 RX ADMIN — SODIUM CHLORIDE, POTASSIUM CHLORIDE, SODIUM LACTATE AND CALCIUM CHLORIDE 125 ML/HR: 600; 310; 30; 20 INJECTION, SOLUTION INTRAVENOUS at 11:19

## 2021-01-20 RX ADMIN — Medication 1 PATCH: at 08:11

## 2021-01-20 NOTE — TELEPHONE ENCOUNTER
We have tried to do several EEG's on this patient and she has been uncooperative and confused. They have called doctor to see about getting her sedated and just waiting on the doctor to order.

## 2021-01-20 NOTE — PLAN OF CARE
Goal Outcome Evaluation:  Plan of Care Reviewed With: patient  Progress: no change  Outcome Summary: Vitals stable. No falls. No c/o pain. Remains oriented to self only, having visual hallucinations. IV team unable to place IV site, MD aware. Straight cathed x1 with good result. Restless over night. Monitoring closely.

## 2021-01-20 NOTE — PROGRESS NOTES
Name: Mary Chavez ADMIT: 2021   : 1970  PCP: Murphy Carty MD    MRN: [id number] LOS: 1 days   AGE/SEX: 50 y.o. female  ROOM: [facility]     Subjective   Subjective   CC: altered mental status  No acute events. Patient remains confused and is unable to provide a reliable history.    Objective   Objective   Vital Signs  Temp:  [97.9 °F (36.6 °C)-98.2 °F (36.8 °C)] 98 °F (36.7 °C)  Heart Rate:  [] 94  Resp:  [16-18] 16  BP: (130-139)/(83-92) 133/92  SpO2:  [95 %-100 %] 99 %  on   ;   Device (Oxygen Therapy): room air  Body mass index is 20.58 kg/m².  Physical Exam  Vitals signs and nursing note reviewed.   Constitutional:       General: She is not in acute distress.     Appearance: She is not toxic-appearing.      Comments: Chronically ill appearing, appears older than stated age   HENT:      Head: Normocephalic and atraumatic.      Nose: Nose normal.      Mouth/Throat:      Mouth: Mucous membranes are moist.      Pharynx: Oropharynx is clear.      Comments: Poor dentition  Eyes:      Conjunctiva/sclera: Conjunctivae normal.      Pupils: Pupils are equal, round, and reactive to light.   Neck:      Musculoskeletal: Normal range of motion and neck supple.   Cardiovascular:      Rate and Rhythm: Normal rate and regular rhythm.      Pulses: Normal pulses.   Pulmonary:      Effort: Pulmonary effort is normal.      Breath sounds: Normal breath sounds. No wheezing or rales.   Abdominal:      General: Bowel sounds are normal.      Palpations: Abdomen is soft.      Tenderness: There is no abdominal tenderness.   Musculoskeletal:         General: Tenderness (right upper extremity) present. No swelling.      Comments: RUE in sling, neurovascularly intact   Skin:     General: Skin is warm and dry.      Capillary Refill: Capillary refill takes less than 2 seconds.   Neurological:      General: No focal deficit present.      Mental Status: She is alert. She is disoriented.      Motor: No weakness.      Psychiatric:         Attention and Perception: She is inattentive.         Mood and Affect: Mood is anxious.         Cognition and Memory: Cognition is impaired.     Results Review     I reviewed the patient's new clinical results.  I reviewed the patient's telemetry.   Results from last 7 days   Lab Units 01/20/21  0302 01/19/21  1219 01/18/21  0623 01/17/21  0943   WBC 10*3/mm3 8.89 8.52 7.36 10.95*   HEMOGLOBIN g/dL 11.7* 9.5* 9.9* 12.2   PLATELETS 10*3/mm3 274 232 209 273     Results from last 7 days   Lab Units 01/20/21  0302 01/19/21  1219 01/18/21  2104 01/18/21  0623 01/17/21  0943   SODIUM mmol/L 138 138  --  143 141   POTASSIUM mmol/L 4.0 2.7* 3.7 3.3* 3.4*   CHLORIDE mmol/L 108* 110*  --  113* 106   CO2 mmol/L 19.9* 16.2*  --  21.4* 26.0   BUN mg/dL 6 6  --  16 19   CREATININE mg/dL 0.36* 0.38*  --  0.50* 0.84   GLUCOSE mg/dL 79 71  --  97 163*   Estimated Creatinine Clearance: 168.7 mL/min (A) (by C-G formula based on SCr of 0.36 mg/dL (L)).  Results from last 7 days   Lab Units 01/17/21  0943   ALBUMIN g/dL 3.00*   BILIRUBIN mg/dL 0.9   ALK PHOS U/L 164*   AST (SGOT) U/L 31   ALT (SGPT) U/L 17     Results from last 7 days   Lab Units 01/20/21  0302 01/19/21  1219 01/18/21  0623 01/17/21  0943   CALCIUM mg/dL 8.3* 7.6* 8.2* 8.7   ALBUMIN g/dL  --   --   --  3.00*   MAGNESIUM mg/dL 1.8  --  1.8 1.8       COVID19   Date Value Ref Range Status   01/17/2021 Not Detected Not Detected - Ref. Range Final   07/25/2020 Not Detected Not Detected - Ref. Range Final     No results found for: HGBA1C, POCGLU    CT Head Without Contrast  Narrative: CT HEAD WITHOUT CONTRAST     HISTORY: Altered mental status, seizure.     COMPARISON: CT head 01/17/2021.     FINDINGS: The brain ventricles are symmetrical. Areas of decreased  attenuation are noted involving the white matter cerebral hemispheres  bilaterally similar in appearance as compared to the prior examination,  nonspecific. This is somewhat prominent for a patient  of 50 years of  age. There is no evidence of intracranial hemorrhage, hydrocephalus or  of a focal area of decreased attenuation to suggest acute infarction.  Further evaluation could be performed with a MRI examination of brain as  indicated.     Impression: No evidence of intracranial hemorrhage, hydrocephalus or of  abnormal extra-axial fluid. Confluent areas of decreased attenuation are  noted involving the white matter similar as bilaterally, nonspecific.  Further evaluation could be performed with MRI examination the brain  with and without contrast.     The above information was called to and discussed with Dr. Gillespie.     Radiation dose reduction techniques were utilized, including automated  exposure control and exposure modulation based on body size.     This report was finalized on 1/18/2021 8:48 AM by Dr. Keith Vines M.D.       Scheduled Medications  cyanocobalamin, 1,000 mcg, Intramuscular, Daily  levETIRAcetam, 1,000 mg, Intravenous, Q12H  nicotine, 1 patch, Transdermal, Q24H  sodium chloride, 10 mL, Intravenous, Q12H  temazepam, 30 mg, Oral, Once    Infusions  lactated ringers, 125 mL/hr, Last Rate: 125 mL/hr (01/20/21 1119)    Diet  Diet Dysphagia; IV - Mechanical Soft No Mixed Consistencies; Thin       Assessment/Plan     Active Hospital Problems    Diagnosis  POA   • B12 deficiency [E53.8]  Yes   • Polysubstance abuse (CMS/HCC) [F19.10]  Yes   • Opioid use disorder (CMS/HCC) [F11.99]  Yes   • Acute psychosis (CMS/HCC) [F23]  Yes   • Acute metabolic encephalopathy [G93.41]  Yes   • Fracture of right humerus [S42.301A]  Yes   • Hypokalemia [E87.6]  Yes   • Chronic pain syndrome [G89.4]  Yes   • Seizures (CMS/HCC) [R56.9]  Yes      Resolved Hospital Problems   No resolved problems to display.   Altered Mental Status  - she has a long history of polysubstance abuse and non-compliance with AEDs; breakthrough seizure is possible but presentation does not sound consistent with this  - patient's   apparently supplied her with street narcotics prior to admission-her UDS is positive for opiates-she does not appear to be in withdrawal but I do suspect that her current presentation is the result of substance abuse  - CT head showing nothing acute  - TSH and ammonia are normal, B12 is low will replace  - she does not appear to have any sort of infection  - continue redirection, PRN zyprexa   - EEG pending  - lupus workup pending; inflammatory markers are elevated  - continue Q4H neuro checks  - may need lumbar puncture   - appreciate neurology and psychiatry recommendations  .  Right Humerus Fracture  - continue immobilization and pain control  - appreciate orthopedic surgery evaluation-conservative management planned for now    NAGMA  - improving, monitor BMP and continue on LR      SCDs for DVT prophylaxis.  Full code.  Discussed with patient and nursing staff..  Anticipate discharge TBD.  timing yet to be determined.      Mason Ortega MD  [facility]  01/20/21  18:01 EST    I wore protective equipment throughout this patient encounter including a face mask, gloves and protective eyewear.  Hand hygiene was performed before donning protective equipment and after removal when leaving the room.

## 2021-01-20 NOTE — PROGRESS NOTES
"Adult Nutrition  Assessment/PES    Patient Name:  Mary Chavez  YOB: 1970  MRN: [id number]  Admit Date:  1/17/2021    Assessment Date:  1/20/2021    Comments:  Nutrition follow up.  Patient remains confused, oriented to self only, having hallucinations.  Longstanding pain pill, crack and heroin abuse.  S/p SLP evaluation yesterday.  Patient now on modified diet with thin liquids.  EEG unable to be completed yesterday d/t patient's restlessness.  MRI brain with severe bilateral white matter changes.    RN reports she is eating some, but is having BMs 10 minutes after she eats.     Visited patient at bedside.  Severe muscle wasting and fat loss noted.  42# (24.9%) weight loss x 6 months.  MSA completed for severe malnutrition.    Adding Boost Breeze daily and Magic Cups BID to help promote kcal and protein intake.    RD to continue to follow.    Reason for Assessment     Row Name 01/20/21 1443          Reason for Assessment    Reason For Assessment  follow-up protocol         Nutrition/Diet History     Row Name 01/20/21 1443          Nutrition/Diet History    Typical Food/Fluid Intake  RN says eating some, says having BMs quickly after eating         Anthropometrics     Row Name 01/20/21 1443          Anthropometrics    Height  167.6 cm (65.98\")        Admit Weight    Admit Weight  -- 127# 1/20        Ideal Body Weight (IBW)    Ideal Body Weight (IBW) (kg)  59.54        Usual Body Weight (UBW)    Usual Body Weight  76.7 kg (169 lb) 7/2/2020     % of Usual Body Weight Assessment  75-84% - moderate deficit 75.4%     Weight Loss  unintentional 42# (24.9%)     Weight Loss Time Frame  6 months        Body Mass Index (BMI)    BMI Assessment  BMI 18.5-24.9: normal 20.52         Labs/Tests/Procedures/Meds     Row Name 01/20/21 1446          Labs/Procedures/Meds    Lab Results Reviewed  reviewed, pertinent     Lab Results Comments  Creat, Ca, Hgb        Diagnostic Tests/Procedures    Diagnostic Test/Procedure " "Reviewed  reviewed, pertinent     Diagnostic Test/Procedures Comments  abnormal MRI brain with severe bilateral white matter changes; s/p SLP yesterday; EEG unable to be completed yesterday        Medications    Pertinent Medications Reviewed  reviewed, pertinent     Pertinent Medications Comments  vit B12, keppra, lactated ringers         Physical Findings     Row Name 01/20/21 1447          Physical Findings    Overall Physical Appearance  loss of subcutaneous fat;loss of muscle mass;generalized wasting     Skin  other (see comments);edema B=14, bruised         Estimated/Assessed Needs     Row Name 01/20/21 144          Calculation Measurements    Height  167.6 cm (65.98\")         Nutrition Prescription Ordered     Row Name 01/20/21 1447          Nutrition Prescription PO    Current PO Diet  Dysphagia     Dysphagia Level  4  Mechanical soft no mixed consistencies     Fluid Consistency  Thin         Evaluation of Received Nutrient/Fluid Intake     Row Name 01/20/21 1440          PO Evaluation    Number of Days PO Intake Evaluated  Insufficient Data     % PO Intake  RN says eating some         Malnutrition Severity Assessment     Row Name 01/20/21 1442          Malnutrition Severity Assessment    Malnutrition Type  Starvation - Related Malnutrition        Insufficient Energy Intake     Insufficient Energy Intake Findings  Severe     Insufficient Energy Intake   <50% of est. energy requirement for >or equal to 1 month        Unintentional Weight Loss     Unintentional Weight Loss Findings  Severe     Unintentional Weight Loss   Weight loss greater than 10% in six months        Muscle Loss    Loss of Muscle Mass Findings  Severe     [place]  Severe - deep hollowing/scooping, lack of muscle to touch, facial bones well defined     Clavicle Bone Region  Severe - protruding prominent bone     Acromion Bone Region  Severe - squared shoulders, bones, and acromion process protrusion prominent     Dorsal Hand Region  " Moderate - slight depression     Patellar Region  Severe - prominent bone, square looking, very little muscle definition        Fat Loss    Subcutaneous Fat Loss Findings  Severe     Orbital Region   Severe - pronounced hollowness/depression, dark circles, loose saggy skin        Criteria Met (Must meet criteria for severity in at least 2 of these categories: M Wasting, Fat Loss, Fluid, Secondary Signs, Wt. Status, Intake)    Patient meets criteria for   Severe Malnutrition         Problem/Interventions:    Problem 2     Row Name 01/20/21 1449          Nutrition Diagnoses Problem 2    Problem 2  Malnutrition     Etiology (related to)  Medical Diagnosis     Substance Use  Drug/illicit/recreational     Signs/Symptoms (evidenced by)  Unintended Weight Change;% UBW;Report of Minimal PO Intake;Report/Observation     Percent (%) UBW  75.4 %     Unintended Weight Change  Loss     Number of Pounds Lost  42     Weight loss time period  6 months         Intervention Goal     Row Name 01/20/21 5696          Intervention Goal    General  Maintain nutrition;Disease management/therapy;Meet nutritional needs for age/condition     PO  Establish PO;Tolerate PO;PO intake (%)     PO Intake %  80 %     Weight  Appropriate weight gain         Nutrition Intervention     Row Name 01/20/21 6690          Nutrition Intervention    RD/Tech Action  Follow Tx progress;Care plan reviewd;Recommend/ordered     Recommended/Ordered  Supplement         Nutrition Prescription     Row Name 01/20/21 3858          Nutrition Prescription PO    PO Prescription  Begin/change supplement     Supplement  Magic Cup;Boost Breeze     Supplement Frequency  2 times a day;Daily     New PO Prescription Ordered?  Yes         Education/Evaluation     Row Name 01/20/21 7931          Education    Education  Will Instruct as appropriate        Monitor/Evaluation    Monitor  Per protocol;PO intake;Supplement intake;Pertinent labs;Weight;Skin status;Symptoms            Electronically signed by:  Evelyn Rodríguez RD  01/20/21 14:51 EST

## 2021-01-20 NOTE — PROGRESS NOTES
"DOS: 2021  NAME: Mary Chavez   : 1970  PCP: Murphy Carty MD  Chief Complaint   Patient presents with   • Seizures     Patient seen in follow-up today; new to me        Subjective: No change in mental status overnight; patient oriented to self and reported visual hallucinations/agitation and impulsiveness.  Currently patient not in restraints and not requiring a sitter.    No family at bedside    Objective:  Vital signs: /92   Pulse 94   Temp 98 °F (36.7 °C) (Oral)   Resp 16   Ht 167.6 cm (65.98\")   Wt 57.8 kg (127 lb 6.8 oz)   SpO2 99%   BMI 20.58 kg/m²       General appearance: Appears older than stated age, NAD, confused/nonsensical conversation.  BMI 20.58  HEENT: Normocephalic, atraumatic, PERRL, no masses or tenderness  COR: RRR  Resp: Even and unlabored  Extremities: right upper extremity ecchymosis redness warmth and edema-pain with range of motion  Skin: warm, dry     Neurological:   MS: oriented to self only, remote memory/recall  severely impaired, decreased attention/concentration,  no obvious neglect.  Nonsensical speech.  Unable to carry conversation.  Not following simple commands.  CN: visual fields full, PERRL, no obvious facial droop, shoulder shrug equal, tongue midline  Motor: Moving all extremities spontaneously except right upper extremity impaired due to known fracture-appearance documented above)  Sensory: Withdraws all 4 extremities to stimuli.  Coordination: Unable   Rapid alternating movements:  Unable   Laboratory results:  Lab Results   Component Value Date    GLUCOSE 79 2021    CALCIUM 8.3 (L) 2021     2021    K 4.0 2021    CO2 19.9 (L) 2021     (H) 2021    BUN 6 2021    CREATININE 0.36 (L) 2021    EGFRIFNONA >150 2021    BCR 16.7 2021    ANIONGAP 10.1 2021     Lab Results   Component Value Date    WBC 8.89 2021    HGB 11.7 (L) 2021    HCT 34.1 2021    MCV " 89.3 01/20/2021     01/20/2021     No results found for: CHOL  No results found for: HDL  No results found for: LDL  No results found for: TRIG         No results found for: BLOODCX  No results found for: WOUNDCX      Review and interpretation of imaging:  CT HEAD WITHOUT CONTRAST     HISTORY: Altered mental status, seizure.     COMPARISON: CT head 01/17/2021.     FINDINGS: The brain ventricles are symmetrical. Areas of decreased  attenuation are noted involving the white matter cerebral hemispheres  bilaterally similar in appearance as compared to the prior examination,  nonspecific. This is somewhat prominent for a patient of 50 years of  age. There is no evidence of intracranial hemorrhage, hydrocephalus or  of a focal area of decreased attenuation to suggest acute infarction.  Further evaluation could be performed with a MRI examination of brain as  indicated.     IMPRESSION:  No evidence of intracranial hemorrhage, hydrocephalus or of  abnormal extra-axial fluid. Confluent areas of decreased attenuation are  noted involving the white matter similar as bilaterally, nonspecific.  Further evaluation could be performed with MRI examination the brain  with and without contrast.     The above information was called to and discussed with Dr. Gillespie.     Radiation dose reduction techniques were utilized, including automated  exposure control and exposure modulation based on body size.     This report was finalized on 1/18/2021 8:48 AM by Dr. Keith Vines M.D.  XR HUMERUS RIGHT-     INDICATIONS: Trauma     TECHNIQUE: 3 views of the right humerus     COMPARISON: Right shoulder x-ray from 06/16/2020     FINDINGS:     A comminuted, angulated fracture is seen at the level of the right  humeral neck with proximal retraction of the main distal fracture  fragment about 3.5 cm. A 3.9 cm osseous fragment is medially displaced.  Moderate hypertrophic degenerative change at the acromioclavicular  joint. Old right rib  deformities are evident.     IMPRESSION:     Right humerus fracture.     This report was finalized on 1/17/2021 9:43 AM by Dr. Tiago Brand M.D.      Work-up to date:   1/17 CTH WO: Excessive motion limiting imaging.  1/17 repeat CTH WO: No acute intracranial abnormalities, bilateral confluent areas of decreased attenuation within the white matter, nonspecific, similar appearance on last MRI brain in 2015.  Labs: Mag 1.8, acetaminophen <5.0, salicylate <0.3, U/A-, UDS + opiates, ethanol levels <10, COVID-19 negative, ammonia 34, TSH 0.401       Impression:  1.  Acute metabolic encephalopathy  2.  Seizure-like activity  3.  Polysubstance abuse  4.  Closed fracture of proximal end of right humerus  5.  Abnormal MRI brain with severe bilateral white matter changes.      Plan:  · EEG (pending); orders for sedation provided as originally attempted and unsuccessful due to patient's willingness to participate  · Consider lumbar puncture if mental status does not improve    Case reviewed with attending  and she agrees with plan above.     KACIE Flaherty

## 2021-01-20 NOTE — PLAN OF CARE
Goal Outcome Evaluation:  Plan of Care Reviewed With: patient  Progress: no change  Outcome Summary: Patient has stable vital signs.She is still confused and restless.Patient continues to have hallucinations.She is eating and drinking without issue.

## 2021-01-21 PROBLEM — E43 SEVERE MALNUTRITION (HCC): Status: ACTIVE | Noted: 2021-01-21

## 2021-01-21 LAB
ANION GAP SERPL CALCULATED.3IONS-SCNC: 11.5 MMOL/L (ref 5–15)
BASOPHILS # BLD AUTO: 0.02 10*3/MM3 (ref 0–0.2)
BASOPHILS NFR BLD AUTO: 0.3 % (ref 0–1.5)
BUN SERPL-MCNC: 5 MG/DL (ref 6–20)
BUN/CREAT SERPL: 14.7 (ref 7–25)
CALCIUM SPEC-SCNC: 8.5 MG/DL (ref 8.6–10.5)
CENTROMERE B AB SER-ACNC: <0.2 AI (ref 0–0.9)
CHLORIDE SERPL-SCNC: 102 MMOL/L (ref 98–107)
CHROMATIN AB SERPL-ACNC: <0.2 AI (ref 0–0.9)
CO2 SERPL-SCNC: 21.5 MMOL/L (ref 22–29)
CREAT SERPL-MCNC: 0.34 MG/DL (ref 0.57–1)
DEPRECATED RDW RBC AUTO: 37.1 FL (ref 37–54)
DSDNA AB SER-ACNC: <1 IU/ML (ref 0–9)
ENA JO1 AB SER-ACNC: <0.2 AI (ref 0–0.9)
ENA RNP AB SER-ACNC: <0.2 AI (ref 0–0.9)
ENA RNP AB SER-ACNC: <0.2 AI (ref 0–0.9)
ENA SCL70 AB SER-ACNC: <0.2 AI (ref 0–0.9)
ENA SM AB SER-ACNC: <0.2 AI (ref 0–0.9)
ENA SM AB SER-ACNC: <0.2 AI (ref 0–0.9)
ENA SS-A AB SER-ACNC: <0.2 AI (ref 0–0.9)
ENA SS-B AB SER-ACNC: <0.2 AI (ref 0–0.9)
EOSINOPHIL # BLD AUTO: 0.01 10*3/MM3 (ref 0–0.4)
EOSINOPHIL NFR BLD AUTO: 0.1 % (ref 0.3–6.2)
ERYTHROCYTE [DISTWIDTH] IN BLOOD BY AUTOMATED COUNT: 12 % (ref 12.3–15.4)
GFR SERPL CREATININE-BSD FRML MDRD: >150 ML/MIN/1.73
GLUCOSE SERPL-MCNC: 86 MG/DL (ref 65–99)
HCT VFR BLD AUTO: 30.3 % (ref 34–46.6)
HGB BLD-MCNC: 10.6 G/DL (ref 12–15.9)
IMM GRANULOCYTES # BLD AUTO: 0.03 10*3/MM3 (ref 0–0.05)
IMM GRANULOCYTES NFR BLD AUTO: 0.4 % (ref 0–0.5)
LYMPHOCYTES # BLD AUTO: 0.9 10*3/MM3 (ref 0.7–3.1)
LYMPHOCYTES NFR BLD AUTO: 12.6 % (ref 19.6–45.3)
Lab: NORMAL
MCH RBC QN AUTO: 29.9 PG (ref 26.6–33)
MCHC RBC AUTO-ENTMCNC: 35 G/DL (ref 31.5–35.7)
MCV RBC AUTO: 85.6 FL (ref 79–97)
MONOCYTES # BLD AUTO: 0.39 10*3/MM3 (ref 0.1–0.9)
MONOCYTES NFR BLD AUTO: 5.4 % (ref 5–12)
NEUTROPHILS NFR BLD AUTO: 5.82 10*3/MM3 (ref 1.7–7)
NEUTROPHILS NFR BLD AUTO: 81.2 % (ref 42.7–76)
NRBC BLD AUTO-RTO: 0 /100 WBC (ref 0–0.2)
PLATELET # BLD AUTO: 309 10*3/MM3 (ref 140–450)
PMV BLD AUTO: 9.6 FL (ref 6–12)
POTASSIUM SERPL-SCNC: 2.9 MMOL/L (ref 3.5–5.2)
RBC # BLD AUTO: 3.54 10*6/MM3 (ref 3.77–5.28)
SODIUM SERPL-SCNC: 135 MMOL/L (ref 136–145)
WBC # BLD AUTO: 7.17 10*3/MM3 (ref 3.4–10.8)

## 2021-01-21 PROCEDURE — 25010000002 CYANOCOBALAMIN PER 1000 MCG: Performed by: INTERNAL MEDICINE

## 2021-01-21 PROCEDURE — 99232 SBSQ HOSP IP/OBS MODERATE 35: CPT | Performed by: NURSE PRACTITIONER

## 2021-01-21 PROCEDURE — 25010000003 LEVETIRACETAM IN NACL 0.75% 1000 MG/100ML SOLUTION: Performed by: PSYCHIATRY & NEUROLOGY

## 2021-01-21 PROCEDURE — 80048 BASIC METABOLIC PNL TOTAL CA: CPT | Performed by: INTERNAL MEDICINE

## 2021-01-21 PROCEDURE — 85025 COMPLETE CBC W/AUTO DIFF WBC: CPT | Performed by: INTERNAL MEDICINE

## 2021-01-21 RX ORDER — CYANOCOBALAMIN 1000 UG/ML
1000 INJECTION, SOLUTION INTRAMUSCULAR; SUBCUTANEOUS ONCE
Status: COMPLETED | OUTPATIENT
Start: 2021-01-22 | End: 2021-01-22

## 2021-01-21 RX ORDER — LEVETIRACETAM 500 MG/1
1000 TABLET ORAL EVERY 12 HOURS SCHEDULED
Status: DISCONTINUED | OUTPATIENT
Start: 2021-01-21 | End: 2021-01-25 | Stop reason: HOSPADM

## 2021-01-21 RX ADMIN — ACETAMINOPHEN ORAL SOLUTION 650 MG: 325 SOLUTION ORAL at 10:09

## 2021-01-21 RX ADMIN — POTASSIUM CHLORIDE 40 MEQ: 1.5 POWDER, FOR SOLUTION ORAL at 21:14

## 2021-01-21 RX ADMIN — ACETAMINOPHEN 650 MG: 325 TABLET, FILM COATED ORAL at 18:26

## 2021-01-21 RX ADMIN — LEVETIRACETAM 1000 MG: 10 INJECTION INTRAVENOUS at 09:20

## 2021-01-21 RX ADMIN — CYANOCOBALAMIN 1000 MCG: 1000 INJECTION INTRAMUSCULAR; SUBCUTANEOUS at 09:20

## 2021-01-21 RX ADMIN — Medication 1 PATCH: at 09:21

## 2021-01-21 RX ADMIN — LEVETIRACETAM 1000 MG: 500 TABLET, FILM COATED ORAL at 20:01

## 2021-01-21 RX ADMIN — ACETAMINOPHEN 650 MG: 325 TABLET, FILM COATED ORAL at 23:07

## 2021-01-21 RX ADMIN — ACETAMINOPHEN ORAL SOLUTION 650 MG: 325 SOLUTION ORAL at 05:26

## 2021-01-21 RX ADMIN — SODIUM CHLORIDE, PRESERVATIVE FREE 10 ML: 5 INJECTION INTRAVENOUS at 20:01

## 2021-01-21 RX ADMIN — SODIUM CHLORIDE, POTASSIUM CHLORIDE, SODIUM LACTATE AND CALCIUM CHLORIDE 125 ML/HR: 600; 310; 30; 20 INJECTION, SOLUTION INTRAVENOUS at 04:27

## 2021-01-21 NOTE — PROGRESS NOTES
"DOS: 2021  NAME: Mary Chavez   : 1970  PCP: Murphy Carty MD  Chief Complaint   Patient presents with   • Seizures       Subjective: No change in mental status overnight. Patient continues to be unable to carry a conversation; now follos some 1 step commands. She is out of restraints on my exam.     No family at bedside    Objective:  Vital signs: /83 (BP Location: Left arm, Patient Position: Lying)   Pulse 99   Temp 98 °F (36.7 °C) (Oral)   Resp 16   Ht 167.6 cm (65.98\")   Wt 56.8 kg (125 lb 3.5 oz)   SpO2 100%   BMI 20.22 kg/m²       General appearance: Appears older than stated age, NAD, confused/nonsensical conversation.  BMI 20.58  HEENT: Normocephalic, atraumatic, PERRL, no masses or tenderness  COR: RRR  Resp: Even and unlabored  Extremities: right upper extremity ecchymosis redness warmth and edema-pain with range of motion  Skin: warm, dry     Neurological:   MS: oriented to self only, remote memory/recall  severely impaired, decreased attention/concentration,  no obvious neglect.  Nonsensical speech.  Unable to carry conversation.  Follows some simple one step commands today  CN: visual fields full, PERRL, no obvious facial droop, shoulder shrug equal, tongue midline  Motor: Moving all extremities spontaneously except right upper extremity impaired due to known fracture-appearance documented above)  Sensory: Withdraws all 4 extremities to stimuli.  Coordination: Unable   Rapid alternating movements:  Unable   Laboratory results:  Lab Results   Component Value Date    GLUCOSE 86 2021    CALCIUM 8.5 (L) 2021     (L) 2021    K 2.9 (L) 2021    CO2 21.5 (L) 2021     2021    BUN 5 (L) 2021    CREATININE 0.34 (L) 2021    EGFRIFNONA >150 2021    BCR 14.7 2021    ANIONGAP 11.5 2021     Lab Results   Component Value Date    WBC 7.17 2021    HGB 10.6 (L) 2021    HCT 30.3 (L) 2021    MCV " 85.6 01/21/2021     01/21/2021     No results found for: CHOL  No results found for: HDL  No results found for: LDL  No results found for: TRIG         No results found for: BLOODCX  No results found for: WOUNDCX      Review and interpretation of imaging:  CT HEAD WITHOUT CONTRAST     HISTORY: Altered mental status, seizure.     COMPARISON: CT head 01/17/2021.     FINDINGS: The brain ventricles are symmetrical. Areas of decreased  attenuation are noted involving the white matter cerebral hemispheres  bilaterally similar in appearance as compared to the prior examination,  nonspecific. This is somewhat prominent for a patient of 50 years of  age. There is no evidence of intracranial hemorrhage, hydrocephalus or  of a focal area of decreased attenuation to suggest acute infarction.  Further evaluation could be performed with a MRI examination of brain as  indicated.     IMPRESSION:  No evidence of intracranial hemorrhage, hydrocephalus or of  abnormal extra-axial fluid. Confluent areas of decreased attenuation are  noted involving the white matter similar as bilaterally, nonspecific.  Further evaluation could be performed with MRI examination the brain  with and without contrast.     The above information was called to and discussed with Dr. Gillespie.     Radiation dose reduction techniques were utilized, including automated  exposure control and exposure modulation based on body size.     This report was finalized on 1/18/2021 8:48 AM by Dr. Keith Vines M.D.  XR HUMERUS RIGHT-     INDICATIONS: Trauma     TECHNIQUE: 3 views of the right humerus     COMPARISON: Right shoulder x-ray from 06/16/2020     FINDINGS:     A comminuted, angulated fracture is seen at the level of the right  humeral neck with proximal retraction of the main distal fracture  fragment about 3.5 cm. A 3.9 cm osseous fragment is medially displaced.  Moderate hypertrophic degenerative change at the acromioclavicular  joint. Old right rib  deformities are evident.     IMPRESSION:     Right humerus fracture.     This report was finalized on 1/17/2021 9:43 AM by Dr. Tiago Brand M.D.      Work-up to date:   1/17 CTH WO: Excessive motion limiting imaging.  1/17 repeat CTH WO: No acute intracranial abnormalities, bilateral confluent areas of decreased attenuation within the white matter, nonspecific, similar appearance on last MRI brain in 2015.  Labs: Mag 1.8, acetaminophen <5.0, salicylate <0.3, U/A-, UDS + opiates, ethanol levels <10, COVID-19 negative, ammonia 34, TSH 0.401       Impression:  1.  Acute metabolic encephalopathy; w/ mild improvement since admission   2.  Seizure-like activity  3.  Polysubstance abuse  4.  Closed fracture of proximal end of right humerus  5.  Abnormal MRI brain with severe bilateral white matter changes.      Plan:  · EEG (pending); unable to tolerate despite medicating   · Consider lumbar puncture if mental status does not improve  · Attending Neurologist will see 01/22 to update further recommendations      Case reviewed with attending  1/21 and she agrees with plan above.     Valeria Culp APRN

## 2021-01-21 NOTE — PROGRESS NOTES
Name: Mary Chavez ADMIT: 2021   : 1970  PCP: Murphy Carty MD    MRN: [id number] LOS: 2 days   AGE/SEX: 50 y.o. female  ROOM: [facility]     Subjective   Subjective   CC: altered mental status  No acute events. Patient remains confused but is able to answer some simple questions today. Main complaint is RUE pain. She is still disoriented and impulsive and has had to be placed in restraints today to protect her.    Objective   Objective   Vital Signs  Temp:  [97.9 °F (36.6 °C)-98 °F (36.7 °C)] 98 °F (36.7 °C)  Heart Rate:  [86-99] 99  Resp:  [16] 16  BP: (112-155)/(83-96) 112/83  SpO2:  [100 %] 100 %  on   ;   Device (Oxygen Therapy): room air  Body mass index is 20.22 kg/m².  Physical Exam  Vitals signs and nursing note reviewed.   Constitutional:       General: She is not in acute distress.     Appearance: She is not toxic-appearing.      Comments: Chronically ill appearing, appears older than stated age   HENT:      Head: Normocephalic and atraumatic.      Nose: Nose normal.      Mouth/Throat:      Mouth: Mucous membranes are moist.      Pharynx: Oropharynx is clear.      Comments: Poor dentition  Eyes:      Conjunctiva/sclera: Conjunctivae normal.      Pupils: Pupils are equal, round, and reactive to light.   Neck:      Musculoskeletal: Normal range of motion and neck supple.   Cardiovascular:      Rate and Rhythm: Normal rate and regular rhythm.      Pulses: Normal pulses.   Pulmonary:      Effort: Pulmonary effort is normal.      Breath sounds: Normal breath sounds. No wheezing or rales.   Abdominal:      General: Bowel sounds are normal.      Palpations: Abdomen is soft.      Tenderness: There is no abdominal tenderness.   Musculoskeletal:         General: Tenderness (right upper extremity) present. No swelling.      Comments: RUE in sling, neurovascularly intact   Skin:     General: Skin is warm and dry.      Capillary Refill: Capillary refill takes less than 2 seconds.   Neurological:       General: No focal deficit present.      Mental Status: She is alert. She is disoriented.      Motor: No weakness.   Psychiatric:         Attention and Perception: Attention normal.         Mood and Affect: Mood and affect normal.         Cognition and Memory: Cognition is impaired.     Results Review     I reviewed the patient's new clinical results.  I reviewed the patient's telemetry.   Results from last 7 days   Lab Units 01/21/21  0821 01/20/21  0302 01/19/21 1219 01/18/21  0623   WBC 10*3/mm3 7.17 8.89 8.52 7.36   HEMOGLOBIN g/dL 10.6* 11.7* 9.5* 9.9*   PLATELETS 10*3/mm3 309 274 232 209     Results from last 7 days   Lab Units 01/21/21  0821 01/20/21  0302 01/19/21 1219 01/18/21  2104 01/18/21  0623   SODIUM mmol/L 135* 138 138  --  143   POTASSIUM mmol/L 2.9* 4.0 2.7* 3.7 3.3*   CHLORIDE mmol/L 102 108* 110*  --  113*   CO2 mmol/L 21.5* 19.9* 16.2*  --  21.4*   BUN mg/dL 5* 6 6  --  16   CREATININE mg/dL 0.34* 0.36* 0.38*  --  0.50*   GLUCOSE mg/dL 86 79 71  --  97   Estimated Creatinine Clearance: 175.5 mL/min (A) (by C-G formula based on SCr of 0.34 mg/dL (L)).  Results from last 7 days   Lab Units 01/17/21  0943   ALBUMIN g/dL 3.00*   BILIRUBIN mg/dL 0.9   ALK PHOS U/L 164*   AST (SGOT) U/L 31   ALT (SGPT) U/L 17     Results from last 7 days   Lab Units 01/21/21  0821 01/20/21  0302 01/19/21 1219 01/18/21  0623 01/17/21  0943   CALCIUM mg/dL 8.5* 8.3* 7.6* 8.2* 8.7   ALBUMIN g/dL  --   --   --   --  3.00*   MAGNESIUM mg/dL  --  1.8  --  1.8 1.8       COVID19   Date Value Ref Range Status   01/17/2021 Not Detected Not Detected - Ref. Range Final   07/25/2020 Not Detected Not Detected - Ref. Range Final     No results found for: HGBA1C, POCGLU    CT Head Without Contrast  Narrative: CT HEAD WITHOUT CONTRAST     HISTORY: Altered mental status, seizure.     COMPARISON: CT head 01/17/2021.     FINDINGS: The brain ventricles are symmetrical. Areas of decreased  attenuation are noted involving the white  matter cerebral hemispheres  bilaterally similar in appearance as compared to the prior examination,  nonspecific. This is somewhat prominent for a patient of 50 years of  age. There is no evidence of intracranial hemorrhage, hydrocephalus or  of a focal area of decreased attenuation to suggest acute infarction.  Further evaluation could be performed with a MRI examination of brain as  indicated.     Impression: No evidence of intracranial hemorrhage, hydrocephalus or of  abnormal extra-axial fluid. Confluent areas of decreased attenuation are  noted involving the white matter similar as bilaterally, nonspecific.  Further evaluation could be performed with MRI examination the brain  with and without contrast.     The above information was called to and discussed with Dr. Gillespie.     Radiation dose reduction techniques were utilized, including automated  exposure control and exposure modulation based on body size.     This report was finalized on 1/18/2021 8:48 AM by Dr. Keith Vines M.D.       Scheduled Medications  levETIRAcetam, 1,000 mg, Intravenous, Q12H  nicotine, 1 patch, Transdermal, Q24H  sodium chloride, 10 mL, Intravenous, Q12H  temazepam, 30 mg, Oral, Once    Infusions  lactated ringers, 125 mL/hr, Last Rate: 125 mL/hr (01/21/21 0427)    Diet  Diet Dysphagia; IV - Mechanical Soft No Mixed Consistencies; Thin       Assessment/Plan     Active Hospital Problems    Diagnosis  POA   • B12 deficiency [E53.8]  Yes   • Polysubstance abuse (CMS/HCC) [F19.10]  Yes   • Opioid use disorder (CMS/HCC) [F11.99]  Yes   • Acute psychosis (CMS/HCC) [F23]  Yes   • Acute metabolic encephalopathy [G93.41]  Yes   • Fracture of right humerus [S42.301A]  Yes   • Hypokalemia [E87.6]  Yes   • Chronic pain syndrome [G89.4]  Yes   • Seizures (CMS/HCC) [R56.9]  Yes      Resolved Hospital Problems   No resolved problems to display.   Altered Mental Status  - she has a long history of polysubstance abuse and non-compliance with AEDs;  breakthrough seizure is possible but presentation does not sound consistent with this  - patient's  apparently supplied her with street narcotics prior to admission-her UDS is positive for opiates-she does not appear to be in withdrawal but I do suspect that her current presentation is the result of substance abuse  - CT head showing nothing acute  - TSH and ammonia are normal, B12 is low-replacing  - she does not appear to have any sort of infection but due to lack of other source as well as her weight loss prior to hospitalization will check CT C/A/P  - continue redirection, PRN zyprexa   - EEG unable to be done due to cooperation issues  - lupus workup pending but negative thusfar; inflammatory markers are elevated  - continue Q4H neuro checks  - may need lumbar puncture   - appreciate neurology and psychiatry recommendations  .  Right Humerus Fracture   - continue immobilization and pain control  - appreciate orthopedic surgery evaluation-conservative management planned for now    NAGMA  - improving, monitor BMP  - taking PO well will SLIV      SCDs for DVT prophylaxis.  Full code.  Discussed with patient, family and nursing staff..  Anticipate discharge TBD.  timing yet to be determined.    I spoke with her , Maxx, over the phone. All questions were answered to his satisfaction.      Mason Ortega MD  [facility]  01/21/21  18:10 EST    I wore protective equipment throughout this patient encounter including a face mask, gloves and protective eyewear.  Hand hygiene was performed before donning protective equipment and after removal when leaving the room.

## 2021-01-21 NOTE — PLAN OF CARE
Goal Outcome Evaluation:  Plan of Care Reviewed With: patient  Progress: no change  Outcome Summary: Vitals stable. No falls. Pt c/o right arm pain, medicated per MAR. Remains disoriented to place, time, and situation. Continues to have visual hallucinations. IV fluids continued. Resting comfortably. Monitoring closely.

## 2021-01-22 ENCOUNTER — APPOINTMENT (OUTPATIENT)
Dept: CT IMAGING | Facility: HOSPITAL | Age: 51
End: 2021-01-22

## 2021-01-22 LAB
ANION GAP SERPL CALCULATED.3IONS-SCNC: 9.5 MMOL/L (ref 5–15)
B2 GLYCOPROT1 IGA SER-ACNC: <9 GPI IGA UNITS (ref 0–25)
B2 GLYCOPROT1 IGG SER-ACNC: <9 GPI IGG UNITS (ref 0–20)
B2 GLYCOPROT1 IGM SER-ACNC: <9 GPI IGM UNITS (ref 0–32)
BASOPHILS # BLD AUTO: 0.01 10*3/MM3 (ref 0–0.2)
BASOPHILS NFR BLD AUTO: 0.1 % (ref 0–1.5)
BUN SERPL-MCNC: 10 MG/DL (ref 6–20)
BUN/CREAT SERPL: 20.8 (ref 7–25)
CALCIUM SPEC-SCNC: 8.9 MG/DL (ref 8.6–10.5)
CHLORIDE SERPL-SCNC: 107 MMOL/L (ref 98–107)
CLUMPED PLATELETS: PRESENT
CO2 SERPL-SCNC: 21.5 MMOL/L (ref 22–29)
CREAT SERPL-MCNC: 0.48 MG/DL (ref 0.57–1)
DEPRECATED RDW RBC AUTO: 37.5 FL (ref 37–54)
EOSINOPHIL # BLD AUTO: 0.06 10*3/MM3 (ref 0–0.4)
EOSINOPHIL NFR BLD AUTO: 0.8 % (ref 0.3–6.2)
ERYTHROCYTE [DISTWIDTH] IN BLOOD BY AUTOMATED COUNT: 12.4 % (ref 12.3–15.4)
GFR SERPL CREATININE-BSD FRML MDRD: 136 ML/MIN/1.73
GLUCOSE SERPL-MCNC: 94 MG/DL (ref 65–99)
HCT VFR BLD AUTO: 29.3 % (ref 34–46.6)
HGB BLD-MCNC: 10.3 G/DL (ref 12–15.9)
HISTONE IGG SER IA-ACNC: 0.3 UNITS (ref 0–0.9)
LYMPHOCYTES # BLD AUTO: 0.99 10*3/MM3 (ref 0.7–3.1)
LYMPHOCYTES NFR BLD AUTO: 13.7 % (ref 19.6–45.3)
MCH RBC QN AUTO: 29.9 PG (ref 26.6–33)
MCHC RBC AUTO-ENTMCNC: 35.2 G/DL (ref 31.5–35.7)
MCV RBC AUTO: 85.2 FL (ref 79–97)
MONOCYTES # BLD AUTO: 0.34 10*3/MM3 (ref 0.1–0.9)
MONOCYTES NFR BLD AUTO: 4.7 % (ref 5–12)
NEUTROPHILS NFR BLD AUTO: 5.75 10*3/MM3 (ref 1.7–7)
NEUTROPHILS NFR BLD AUTO: 79.9 % (ref 42.7–76)
PLATELET # BLD AUTO: 237 10*3/MM3 (ref 140–450)
PMV BLD AUTO: 10.2 FL (ref 6–12)
POTASSIUM SERPL-SCNC: 4.1 MMOL/L (ref 3.5–5.2)
RBC # BLD AUTO: 3.44 10*6/MM3 (ref 3.77–5.28)
RBC MORPH BLD: NORMAL
SODIUM SERPL-SCNC: 138 MMOL/L (ref 136–145)
WBC # BLD AUTO: 7.21 10*3/MM3 (ref 3.4–10.8)
WBC MORPH BLD: NORMAL

## 2021-01-22 PROCEDURE — 85007 BL SMEAR W/DIFF WBC COUNT: CPT | Performed by: INTERNAL MEDICINE

## 2021-01-22 PROCEDURE — 99232 SBSQ HOSP IP/OBS MODERATE 35: CPT | Performed by: PSYCHIATRY & NEUROLOGY

## 2021-01-22 PROCEDURE — 74176 CT ABD & PELVIS W/O CONTRAST: CPT

## 2021-01-22 PROCEDURE — 71250 CT THORAX DX C-: CPT

## 2021-01-22 PROCEDURE — 80048 BASIC METABOLIC PNL TOTAL CA: CPT | Performed by: INTERNAL MEDICINE

## 2021-01-22 PROCEDURE — 85025 COMPLETE CBC W/AUTO DIFF WBC: CPT | Performed by: INTERNAL MEDICINE

## 2021-01-22 PROCEDURE — 25010000002 CYANOCOBALAMIN PER 1000 MCG: Performed by: INTERNAL MEDICINE

## 2021-01-22 RX ORDER — CYANOCOBALAMIN 1000 UG/ML
1000 INJECTION, SOLUTION INTRAMUSCULAR; SUBCUTANEOUS DAILY
Status: COMPLETED | OUTPATIENT
Start: 2021-01-23 | End: 2021-01-25

## 2021-01-22 RX ORDER — OLANZAPINE 7.5 MG/1
7.5 TABLET ORAL ONCE
Status: COMPLETED | OUTPATIENT
Start: 2021-01-22 | End: 2021-01-22

## 2021-01-22 RX ADMIN — ACETAMINOPHEN 650 MG: 325 TABLET, FILM COATED ORAL at 05:34

## 2021-01-22 RX ADMIN — ACETAMINOPHEN 650 MG: 325 TABLET, FILM COATED ORAL at 12:11

## 2021-01-22 RX ADMIN — CYANOCOBALAMIN 1000 MCG: 1000 INJECTION INTRAMUSCULAR; SUBCUTANEOUS at 08:28

## 2021-01-22 RX ADMIN — POTASSIUM CHLORIDE 40 MEQ: 1.5 POWDER, FOR SOLUTION ORAL at 01:33

## 2021-01-22 RX ADMIN — OLANZAPINE 7.5 MG: 7.5 TABLET ORAL at 20:56

## 2021-01-22 RX ADMIN — POTASSIUM CHLORIDE 40 MEQ: 1.5 POWDER, FOR SOLUTION ORAL at 05:33

## 2021-01-22 RX ADMIN — Medication 1 PATCH: at 08:31

## 2021-01-22 RX ADMIN — LEVETIRACETAM 1000 MG: 500 TABLET, FILM COATED ORAL at 08:27

## 2021-01-22 RX ADMIN — LEVETIRACETAM 1000 MG: 500 TABLET, FILM COATED ORAL at 20:56

## 2021-01-22 RX ADMIN — ACETAMINOPHEN 650 MG: 325 TABLET, FILM COATED ORAL at 20:56

## 2021-01-22 NOTE — PLAN OF CARE
Goal Outcome Evaluation:  Plan of Care Reviewed With: patient  Progress: no change  Outcome Summary: Vitals stable. No falls. Pt c/o right arm/shoulder pain, medicated per MAR. Remains disoriented to situation, sometimes time and place. Still having visual hallucinations and illogical speech at times. Able to get up to bedside commode with assist x1. Remains in a posey vest for safety and impulsivity. Monitoring closely.

## 2021-01-22 NOTE — PROGRESS NOTES
Neurology Progress Note    Reason for visit  Follow up for encephalopathy    Interval History  Patient reports she is doing okay and wants to know why  cannot see her.   She wants results of CTs scans done earlier today  Eating dinner, finishing everything on tray.    Medications:  Scheduled Meds:levETIRAcetam, 1,000 mg, Oral, Q12H  nicotine, 1 patch, Transdermal, Q24H  sodium chloride, 10 mL, Intravenous, Q12H  temazepam, 30 mg, Oral, Once      Continuous Infusions:   PRN Meds:.•  acetaminophen **OR** acetaminophen **OR** acetaminophen  •  ipratropium-albuterol  •  LORazepam  •  nitroglycerin  •  OLANZapine  •  ondansetron  •  potassium chloride **OR** potassium chloride **OR** potassium chloride  •  [COMPLETED] Insert peripheral IV **AND** sodium chloride  •  sodium chloride    Review of Systems:   Review of Systems   Respiratory: Negative.    Cardiovascular: Negative.    Gastrointestinal: Negative.    Genitourinary: Negative.    Musculoskeletal: Negative.    Psychiatric/Behavioral: Positive for confusion.     Vital Signs  Temp:  [97.2 °F (36.2 °C)-98.2 °F (36.8 °C)] 97.4 °F (36.3 °C)  Heart Rate:  [81-94] 94  Resp:  [16-20] 20  BP: (107-138)/(73-91) 127/78    Physical Exam:  Constitutional: appears comfortable  HEENT: no rash  CVS:  Regular rate and rhythm.    Musculoskeletal:  No signs of peripheral edema  Neurologic:   Alert, interactive   Not oriented to place   Poor executive function   Does recall having CTs this morning and asking about results   Moving all extremities well   No involuntary movements  Psychiatric: no anxiety     Results Review:    HIV   negative  Gillespie Ab Negative  RNP Ab  Negative  JOAN panel negative  B12   267  Ammonia 34  TSH  0.41      Medical Decision Making and Recommendations  Metabolic encephalopathy  Resolving  More lucid today, still disoriented  Will give a few booster shots of B12 while here for her low normal level.    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  This included n95 face mask, gloves and protective eyewear.  I washed my hands before entering the room and immediately upon leaving the room.  Patient was not wearing a surgical mask.       Luly Agosto MD  01/22/21  17:01 EST

## 2021-01-23 LAB
ANION GAP SERPL CALCULATED.3IONS-SCNC: 13.4 MMOL/L (ref 5–15)
BASOPHILS # BLD AUTO: 0.05 10*3/MM3 (ref 0–0.2)
BASOPHILS NFR BLD AUTO: 0.4 % (ref 0–1.5)
BUN SERPL-MCNC: 13 MG/DL (ref 6–20)
BUN/CREAT SERPL: 35.1 (ref 7–25)
CALCIUM SPEC-SCNC: 9 MG/DL (ref 8.6–10.5)
CHLORIDE SERPL-SCNC: 103 MMOL/L (ref 98–107)
CO2 SERPL-SCNC: 20.6 MMOL/L (ref 22–29)
CREAT SERPL-MCNC: 0.37 MG/DL (ref 0.57–1)
DEPRECATED RDW RBC AUTO: 41.2 FL (ref 37–54)
EOSINOPHIL # BLD AUTO: 0.09 10*3/MM3 (ref 0–0.4)
EOSINOPHIL NFR BLD AUTO: 0.8 % (ref 0.3–6.2)
ERYTHROCYTE [DISTWIDTH] IN BLOOD BY AUTOMATED COUNT: 12.9 % (ref 12.3–15.4)
GFR SERPL CREATININE-BSD FRML MDRD: >150 ML/MIN/1.73
GLUCOSE SERPL-MCNC: 84 MG/DL (ref 65–99)
HCT VFR BLD AUTO: 34.6 % (ref 34–46.6)
HGB BLD-MCNC: 11.7 G/DL (ref 12–15.9)
LYMPHOCYTES # BLD AUTO: 1.76 10*3/MM3 (ref 0.7–3.1)
LYMPHOCYTES NFR BLD AUTO: 15.6 % (ref 19.6–45.3)
MCH RBC QN AUTO: 29.5 PG (ref 26.6–33)
MCHC RBC AUTO-ENTMCNC: 33.8 G/DL (ref 31.5–35.7)
MCV RBC AUTO: 87.4 FL (ref 79–97)
MONOCYTES # BLD AUTO: 0.52 10*3/MM3 (ref 0.1–0.9)
MONOCYTES NFR BLD AUTO: 4.6 % (ref 5–12)
NEUTROPHILS NFR BLD AUTO: 76 % (ref 42.7–76)
NEUTROPHILS NFR BLD AUTO: 8.55 10*3/MM3 (ref 1.7–7)
PLAT MORPH BLD: NORMAL
PLATELET # BLD AUTO: 394 10*3/MM3 (ref 140–450)
PMV BLD AUTO: 10.4 FL (ref 6–12)
POTASSIUM SERPL-SCNC: 3.6 MMOL/L (ref 3.5–5.2)
RBC # BLD AUTO: 3.96 10*6/MM3 (ref 3.77–5.28)
RBC MORPH BLD: NORMAL
SODIUM SERPL-SCNC: 137 MMOL/L (ref 136–145)
WBC # BLD AUTO: 11.26 10*3/MM3 (ref 3.4–10.8)
WBC MORPH BLD: NORMAL

## 2021-01-23 PROCEDURE — 97112 NEUROMUSCULAR REEDUCATION: CPT

## 2021-01-23 PROCEDURE — 80048 BASIC METABOLIC PNL TOTAL CA: CPT | Performed by: INTERNAL MEDICINE

## 2021-01-23 PROCEDURE — 85007 BL SMEAR W/DIFF WBC COUNT: CPT | Performed by: INTERNAL MEDICINE

## 2021-01-23 PROCEDURE — 25010000002 CYANOCOBALAMIN PER 1000 MCG: Performed by: PSYCHIATRY & NEUROLOGY

## 2021-01-23 PROCEDURE — 97166 OT EVAL MOD COMPLEX 45 MIN: CPT

## 2021-01-23 PROCEDURE — 85025 COMPLETE CBC W/AUTO DIFF WBC: CPT | Performed by: INTERNAL MEDICINE

## 2021-01-23 PROCEDURE — 97535 SELF CARE MNGMENT TRAINING: CPT

## 2021-01-23 RX ADMIN — LEVETIRACETAM 1000 MG: 500 TABLET, FILM COATED ORAL at 08:23

## 2021-01-23 RX ADMIN — Medication 1 PATCH: at 08:24

## 2021-01-23 RX ADMIN — SODIUM CHLORIDE, PRESERVATIVE FREE 10 ML: 5 INJECTION INTRAVENOUS at 08:24

## 2021-01-23 RX ADMIN — CYANOCOBALAMIN 1000 MCG: 1000 INJECTION INTRAMUSCULAR; SUBCUTANEOUS at 08:22

## 2021-01-23 RX ADMIN — OLANZAPINE 5 MG: 10 INJECTION, POWDER, LYOPHILIZED, FOR SOLUTION INTRAMUSCULAR at 00:01

## 2021-01-23 RX ADMIN — ACETAMINOPHEN 650 MG: 325 TABLET, FILM COATED ORAL at 18:33

## 2021-01-23 RX ADMIN — LEVETIRACETAM 1000 MG: 500 TABLET, FILM COATED ORAL at 20:04

## 2021-01-23 RX ADMIN — ACETAMINOPHEN 650 MG: 325 TABLET, FILM COATED ORAL at 08:22

## 2021-01-23 NOTE — CONSULTS
The patient is now in a Posey restraint after attempting to get out of bed, but she is cooperative with interview and her mentation seems significantly improved from previous evaluation.

## 2021-01-23 NOTE — PROGRESS NOTES
Name: Mary Chavez ADMIT: 2021   : 1970  PCP: Murphy Carty MD    MRN: [id number] LOS: 3 days   AGE/SEX: 50 y.o. female  ROOM: [facility]     Subjective   Subjective   CC: altered mental status  No acute events. Patient remains confused but this is improving. Main complaint is RUE pain and wanting to see her . She is still disoriented and impulsive and has had to be placed in restraints today to protect her.    Objective   Objective   Vital Signs  Temp:  [97.2 °F (36.2 °C)-98.2 °F (36.8 °C)] 97.6 °F (36.4 °C)  Heart Rate:  [81-94] 93  Resp:  [16-20] 18  BP: (107-138)/(73-94) 137/94  SpO2:  [95 %-100 %] 100 %  on   ;   Device (Oxygen Therapy): room air  Body mass index is 20.22 kg/m².  Physical Exam  Vitals signs and nursing note reviewed.   Constitutional:       General: She is not in acute distress.     Appearance: She is not toxic-appearing.      Comments: Chronically ill appearing, appears older than stated age   HENT:      Head: Normocephalic and atraumatic.      Nose: Nose normal.      Mouth/Throat:      Mouth: Mucous membranes are moist.      Pharynx: Oropharynx is clear.      Comments: Poor dentition  Eyes:      Conjunctiva/sclera: Conjunctivae normal.      Pupils: Pupils are equal, round, and reactive to light.   Neck:      Musculoskeletal: Normal range of motion and neck supple.   Cardiovascular:      Rate and Rhythm: Normal rate and regular rhythm.      Pulses: Normal pulses.   Pulmonary:      Effort: Pulmonary effort is normal.      Breath sounds: Normal breath sounds. No wheezing or rales.   Abdominal:      General: Bowel sounds are normal.      Palpations: Abdomen is soft.      Tenderness: There is no abdominal tenderness.   Musculoskeletal:         General: Tenderness (right upper extremity) present. No swelling.      Comments: RUE in sling, neurovascularly intact   Skin:     General: Skin is warm and dry.      Capillary Refill: Capillary refill takes less than 2 seconds.      Neurological:      General: No focal deficit present.      Mental Status: She is alert. She is disoriented.      Motor: No weakness.   Psychiatric:         Attention and Perception: Attention normal.         Mood and Affect: Mood and affect normal.         Cognition and Memory: Cognition is impaired.     Results Review     I reviewed the patient's new clinical results.  I reviewed the patient's telemetry.   Results from last 7 days   Lab Units 01/22/21  0659 01/21/21  0821 01/20/21  0302 01/19/21  1219   WBC 10*3/mm3 7.21 7.17 8.89 8.52   HEMOGLOBIN g/dL 10.3* 10.6* 11.7* 9.5*   PLATELETS 10*3/mm3 237 309 274 232     Results from last 7 days   Lab Units 01/22/21  1209 01/21/21  0821 01/20/21  0302 01/19/21  1219   SODIUM mmol/L 138 135* 138 138   POTASSIUM mmol/L 4.1 2.9* 4.0 2.7*   CHLORIDE mmol/L 107 102 108* 110*   CO2 mmol/L 21.5* 21.5* 19.9* 16.2*   BUN mg/dL 10 5* 6 6   CREATININE mg/dL 0.48* 0.34* 0.36* 0.38*   GLUCOSE mg/dL 94 86 79 71   Estimated Creatinine Clearance: 124.3 mL/min (A) (by C-G formula based on SCr of 0.48 mg/dL (L)).  Results from last 7 days   Lab Units 01/17/21  0943   ALBUMIN g/dL 3.00*   BILIRUBIN mg/dL 0.9   ALK PHOS U/L 164*   AST (SGOT) U/L 31   ALT (SGPT) U/L 17     Results from last 7 days   Lab Units 01/22/21  1209 01/21/21  0821 01/20/21  0302 01/19/21  1219 01/18/21  0623 01/17/21  0943   CALCIUM mg/dL 8.9 8.5* 8.3* 7.6* 8.2* 8.7   ALBUMIN g/dL  --   --   --   --   --  3.00*   MAGNESIUM mg/dL  --   --  1.8  --  1.8 1.8       COVID19   Date Value Ref Range Status   01/17/2021 Not Detected Not Detected - Ref. Range Final   07/25/2020 Not Detected Not Detected - Ref. Range Final     No results found for: HGBA1C, POCGLU    CT CHEST WITHOUT CONTRAST DIAGNOSTIC  Narrative: CT CHEST, ABDOMEN, AND PELVIS WITHOUT CONTRAST.     HISTORY: 50-year-old female with unintentional weight loss. Gastric  bypass and hysterectomy in the past.     TECHNIQUE: Radiation dose reduction techniques were  utilized, including  automated exposure control and exposure modulation based on body size.   3 mm images were obtained through the chest, abdomen, and pelvis without  the administration of IV contrast. Compared with CTs from 06/16/2020     FINDINGS:  CHEST: There are no suspicious pulmonary opacities or nodules. There are  no pleural or pericardial effusions. There is no lymphadenopathy within  the chest. A subacute comminuted right humeral neck fracture with  surrounding edema is noted. There are multiple old as well as healing  right rib fractures.     ABDOMEN/PELVIS: There is streak artifact from the patient's arms. The  patient was unable to raise the arms overhead. Noncontrasted liver and  gallbladder appear unremarkable. Noncontrasted spleen, pancreas,  adrenals, and kidneys appear unremarkable. There is significant air  distention of the segment of bowel which has the primary small bowel  anastomosis. The segment has been distended in the past, but to a  smaller degree. The colon is essentially devoid of formed stool and is  nearly completely collapsed. No definitive bowel thickening is seen.  There is a small amount of air within the vaginal canal and there is  also a small amount of air within the nondependent aspect of the urinary  bladder. There are mild abdominal aortic atherosclerotic changes without  aneurysmal dilatation. There is significant diffuse osseous  demineralization. Since the previous CT, there has been development of  an L3 compression fracture with complete loss of height at the mid  aspect of the vertebral body with approximately 8 mm retropulsion into  the spinal canal. (Fracture at L5 and kyphoplasty changes at L2, T12,  and T11 appear unchanged.     Impression: 1. The colon is nearly completely collapsed and devoid of formed stool,  but there is no evidence for colitis. The segment of small bowel which  has the primary small bowel anastomosis related to the gastric bypass in  the  past is significantly dilated, more so than on the previous  examinations. There is likely a small bowel ileus or possibly a  low-grade obstruction. Follow-up is recommended.  2. Air within the vaginal canal and urinary bladder may be iatrogenic,  but otherwise fistulas with bowel are possible. Please correlate  clinically.  3. New L3 compression fracture as described since the previous CT from  06/16/2020.  4. Subacute comminuted right humeral neck fracture. Multiple old and  healing right rib fractures.     This report was finalized on 1/22/2021 5:10 PM by Dr. Cindi Mercado M.D.     CT Abdomen Pelvis Without Contrast  Narrative: CT CHEST, ABDOMEN, AND PELVIS WITHOUT CONTRAST.     HISTORY: 50-year-old female with unintentional weight loss. Gastric  bypass and hysterectomy in the past.     TECHNIQUE: Radiation dose reduction techniques were utilized, including  automated exposure control and exposure modulation based on body size.   3 mm images were obtained through the chest, abdomen, and pelvis without  the administration of IV contrast. Compared with CTs from 06/16/2020     FINDINGS:  CHEST: There are no suspicious pulmonary opacities or nodules. There are  no pleural or pericardial effusions. There is no lymphadenopathy within  the chest. A subacute comminuted right humeral neck fracture with  surrounding edema is noted. There are multiple old as well as healing  right rib fractures.     ABDOMEN/PELVIS: There is streak artifact from the patient's arms. The  patient was unable to raise the arms overhead. Noncontrasted liver and  gallbladder appear unremarkable. Noncontrasted spleen, pancreas,  adrenals, and kidneys appear unremarkable. There is significant air  distention of the segment of bowel which has the primary small bowel  anastomosis. The segment has been distended in the past, but to a  smaller degree. The colon is essentially devoid of formed stool and is  nearly completely collapsed. No definitive bowel  thickening is seen.  There is a small amount of air within the vaginal canal and there is  also a small amount of air within the nondependent aspect of the urinary  bladder. There are mild abdominal aortic atherosclerotic changes without  aneurysmal dilatation. There is significant diffuse osseous  demineralization. Since the previous CT, there has been development of  an L3 compression fracture with complete loss of height at the mid  aspect of the vertebral body with approximately 8 mm retropulsion into  the spinal canal. (Fracture at L5 and kyphoplasty changes at L2, T12,  and T11 appear unchanged.     Impression: 1. The colon is nearly completely collapsed and devoid of formed stool,  but there is no evidence for colitis. The segment of small bowel which  has the primary small bowel anastomosis related to the gastric bypass in  the past is significantly dilated, more so than on the previous  examinations. There is likely a small bowel ileus or possibly a  low-grade obstruction. Follow-up is recommended.  2. Air within the vaginal canal and urinary bladder may be iatrogenic,  but otherwise fistulas with bowel are possible. Please correlate  clinically.  3. New L3 compression fracture as described since the previous CT from  06/16/2020.  4. Subacute comminuted right humeral neck fracture. Multiple old and  healing right rib fractures.     This report was finalized on 1/22/2021 5:10 PM by Dr. Cindi Mercado M.D.       Scheduled Medications  [START ON 1/23/2021] cyanocobalamin, 1,000 mcg, Intramuscular, Daily  levETIRAcetam, 1,000 mg, Oral, Q12H  nicotine, 1 patch, Transdermal, Q24H  sodium chloride, 10 mL, Intravenous, Q12H  temazepam, 30 mg, Oral, Once    Infusions   Diet  Diet Dysphagia; IV - Mechanical Soft No Mixed Consistencies; Thin       Assessment/Plan     Active Hospital Problems    Diagnosis  POA   • Severe malnutrition (CMS/HCC) [E43]  Yes   • B12 deficiency [E53.8]  Yes   • Polysubstance abuse (CMS/HCC)  [F19.10]  Yes   • Opioid use disorder (CMS/HCC) [F11.99]  Yes   • Acute psychosis (CMS/HCC) [F23]  Yes   • Acute metabolic encephalopathy [G93.41]  Yes   • Fracture of right humerus [S42.301A]  Yes   • Hypokalemia [E87.6]  Yes   • Chronic pain syndrome [G89.4]  Yes   • Seizures (CMS/HCC) [R56.9]  Yes      Resolved Hospital Problems   No resolved problems to display.   Altered Mental Status  - she has a long history of polysubstance abuse and non-compliance with AEDs; breakthrough seizure is possible but presentation does not sound consistent with this  - patient's  apparently supplied her with street narcotics prior to admission-her UDS is positive for opiates-she does not appear to be in withdrawal but I do suspect that her current presentation is the result of a long history of substance abuse  - CT head showing nothing acute  - TSH and ammonia are normal, B12 is low-replacing  - she does not appear to have any sort of infection but due to lack of other source as well as her weight loss prior to hospitalization will check CT C/A/P  - continue redirection, PRN zyprexa   - EEG unable to be done due to cooperation issues  - lupus workup negative  - continue Q4H neuro checks  - appreciate neurology and psychiatry recommendations-they have signed off  .  Right Humerus Fracture   - continue immobilization and pain control  - appreciate orthopedic surgery evaluation-conservative management planned for now    NAGMA  - improved, monitor BMP      SCDs for DVT prophylaxis.  Full code.  Discussed with patient, family and nursing staff..  Anticipate discharge home in 1-2 days.        Mason Ortega MD  [facility]  01/22/21  20:09 EST    I wore protective equipment throughout this patient encounter including a face mask, gloves and protective eyewear.  Hand hygiene was performed before donning protective equipment and after removal when leaving the room.

## 2021-01-23 NOTE — PROGRESS NOTES
Name: Mary Chavez ADMIT: 2021   : 1970  PCP: Murphy Carty MD    MRN: [id number] LOS: 4 days   AGE/SEX: 50 y.o. female  ROOM: [facility]     Subjective   Subjective   No acute events overnight.  No complaints for me this morning.  She will see her .  Was in restraints this morning due to impulsivity.    Review of Systems   Constitutional: Negative.    Respiratory: Negative.    Cardiovascular: Negative.    Gastrointestinal: Negative.    Psychiatric/Behavioral: Positive for agitation. Negative for suicidal ideas. The patient is nervous/anxious.         Objective   Objective   Vital Signs  Temp:  [97.4 °F (36.3 °C)-97.6 °F (36.4 °C)] 97.6 °F (36.4 °C)  Heart Rate:  [84-94] 89  Resp:  [18-20] 18  BP: (104-142)/(73-98) 104/73  SpO2:  [100 %] 100 %  on   ;   Device (Oxygen Therapy): room air  Body mass index is 19.47 kg/m².  Physical Exam  Constitutional:       General: She is not in acute distress.     Appearance: She is not toxic-appearing.   HENT:      Head: Normocephalic and atraumatic.   Cardiovascular:      Rate and Rhythm: Normal rate and regular rhythm.   Pulmonary:      Effort: Pulmonary effort is normal.      Breath sounds: Normal breath sounds.   Abdominal:      General: There is no distension.      Palpations: Abdomen is soft.      Tenderness: There is no abdominal tenderness.   Musculoskeletal:      Comments: Right upper extremity in sling.  Sensation intact   Skin:     General: Skin is warm and dry.   Neurological:      General: No focal deficit present.      Mental Status: She is alert and oriented to person, place, and time.   Psychiatric:         Thought Content: Thought content normal.      Comments: Tangential thought content         Results Review     I reviewed the patient's new clinical results.  Results from last 7 days   Lab Units 21  0631 21  0659 21  0821 21  0302   WBC 10*3/mm3 11.26* 7.21 7.17 8.89   HEMOGLOBIN g/dL 11.7* 10.3* 10.6* 11.7*      PLATELETS 10*3/mm3 394 237 309 274     Results from last 7 days   Lab Units 01/22/21  1209 01/21/21  0821 01/20/21  0302 01/19/21  1219   SODIUM mmol/L 138 135* 138 138   POTASSIUM mmol/L 4.1 2.9* 4.0 2.7*   CHLORIDE mmol/L 107 102 108* 110*   CO2 mmol/L 21.5* 21.5* 19.9* 16.2*   BUN mg/dL 10 5* 6 6   CREATININE mg/dL 0.48* 0.34* 0.36* 0.38*   GLUCOSE mg/dL 94 86 79 71   Estimated Creatinine Clearance: 119.7 mL/min (A) (by C-G formula based on SCr of 0.48 mg/dL (L)).  Results from last 7 days   Lab Units 01/17/21  0943   ALBUMIN g/dL 3.00*   BILIRUBIN mg/dL 0.9   ALK PHOS U/L 164*   AST (SGOT) U/L 31   ALT (SGPT) U/L 17     Results from last 7 days   Lab Units 01/22/21  1209 01/21/21  0821 01/20/21  0302 01/19/21  1219 01/18/21  0623 01/17/21  0943   CALCIUM mg/dL 8.9 8.5* 8.3* 7.6* 8.2* 8.7   ALBUMIN g/dL  --   --   --   --   --  3.00*   MAGNESIUM mg/dL  --   --  1.8  --  1.8 1.8       COVID19   Date Value Ref Range Status   01/17/2021 Not Detected Not Detected - Ref. Range Final   07/25/2020 Not Detected Not Detected - Ref. Range Final     No results found for: HGBA1C, POCGLU    CT CHEST WITHOUT CONTRAST DIAGNOSTIC  Narrative: CT CHEST, ABDOMEN, AND PELVIS WITHOUT CONTRAST.     HISTORY: 50-year-old female with unintentional weight loss. Gastric  bypass and hysterectomy in the past.     TECHNIQUE: Radiation dose reduction techniques were utilized, including  automated exposure control and exposure modulation based on body size.   3 mm images were obtained through the chest, abdomen, and pelvis without  the administration of IV contrast. Compared with CTs from 06/16/2020     FINDINGS:  CHEST: There are no suspicious pulmonary opacities or nodules. There are  no pleural or pericardial effusions. There is no lymphadenopathy within  the chest. A subacute comminuted right humeral neck fracture with  surrounding edema is noted. There are multiple old as well as healing  right rib fractures.     ABDOMEN/PELVIS: There is  streak artifact from the patient's arms. The  patient was unable to raise the arms overhead. Noncontrasted liver and  gallbladder appear unremarkable. Noncontrasted spleen, pancreas,  adrenals, and kidneys appear unremarkable. There is significant air  distention of the segment of bowel which has the primary small bowel  anastomosis. The segment has been distended in the past, but to a  smaller degree. The colon is essentially devoid of formed stool and is  nearly completely collapsed. No definitive bowel thickening is seen.  There is a small amount of air within the vaginal canal and there is  also a small amount of air within the nondependent aspect of the urinary  bladder. There are mild abdominal aortic atherosclerotic changes without  aneurysmal dilatation. There is significant diffuse osseous  demineralization. Since the previous CT, there has been development of  an L3 compression fracture with complete loss of height at the mid  aspect of the vertebral body with approximately 8 mm retropulsion into  the spinal canal. (Fracture at L5 and kyphoplasty changes at L2, T12,  and T11 appear unchanged.     Impression: 1. The colon is nearly completely collapsed and devoid of formed stool,  but there is no evidence for colitis. The segment of small bowel which  has the primary small bowel anastomosis related to the gastric bypass in  the past is significantly dilated, more so than on the previous  examinations. There is likely a small bowel ileus or possibly a  low-grade obstruction. Follow-up is recommended.  2. Air within the vaginal canal and urinary bladder may be iatrogenic,  but otherwise fistulas with bowel are possible. Please correlate  clinically.  3. New L3 compression fracture as described since the previous CT from  06/16/2020.  4. Subacute comminuted right humeral neck fracture. Multiple old and  healing right rib fractures.     This report was finalized on 1/22/2021 5:10 PM by Dr. Cindi Mercado M.D.        CT Abdomen Pelvis Without Contrast  Narrative: CT CHEST, ABDOMEN, AND PELVIS WITHOUT CONTRAST.     HISTORY: 50-year-old female with unintentional weight loss. Gastric  bypass and hysterectomy in the past.     TECHNIQUE: Radiation dose reduction techniques were utilized, including  automated exposure control and exposure modulation based on body size.   3 mm images were obtained through the chest, abdomen, and pelvis without  the administration of IV contrast. Compared with CTs from 06/16/2020     FINDINGS:  CHEST: There are no suspicious pulmonary opacities or nodules. There are  no pleural or pericardial effusions. There is no lymphadenopathy within  the chest. A subacute comminuted right humeral neck fracture with  surrounding edema is noted. There are multiple old as well as healing  right rib fractures.     ABDOMEN/PELVIS: There is streak artifact from the patient's arms. The  patient was unable to raise the arms overhead. Noncontrasted liver and  gallbladder appear unremarkable. Noncontrasted spleen, pancreas,  adrenals, and kidneys appear unremarkable. There is significant air  distention of the segment of bowel which has the primary small bowel  anastomosis. The segment has been distended in the past, but to a  smaller degree. The colon is essentially devoid of formed stool and is  nearly completely collapsed. No definitive bowel thickening is seen.  There is a small amount of air within the vaginal canal and there is  also a small amount of air within the nondependent aspect of the urinary  bladder. There are mild abdominal aortic atherosclerotic changes without  aneurysmal dilatation. There is significant diffuse osseous  demineralization. Since the previous CT, there has been development of  an L3 compression fracture with complete loss of height at the mid  aspect of the vertebral body with approximately 8 mm retropulsion into  the spinal canal. (Fracture at L5 and kyphoplasty changes at L2, T12,  and  T11 appear unchanged.     Impression: 1. The colon is nearly completely collapsed and devoid of formed stool,  but there is no evidence for colitis. The segment of small bowel which  has the primary small bowel anastomosis related to the gastric bypass in  the past is significantly dilated, more so than on the previous  examinations. There is likely a small bowel ileus or possibly a  low-grade obstruction. Follow-up is recommended.  2. Air within the vaginal canal and urinary bladder may be iatrogenic,  but otherwise fistulas with bowel are possible. Please correlate  clinically.  3. New L3 compression fracture as described since the previous CT from  06/16/2020.  4. Subacute comminuted right humeral neck fracture. Multiple old and  healing right rib fractures.     This report was finalized on 1/22/2021 5:10 PM by Dr. Cindi Mercado M.D.       Scheduled Medications  cyanocobalamin, 1,000 mcg, Intramuscular, Daily  levETIRAcetam, 1,000 mg, Oral, Q12H  nicotine, 1 patch, Transdermal, Q24H  sodium chloride, 10 mL, Intravenous, Q12H  temazepam, 30 mg, Oral, Once    Infusions   Diet  Diet Dysphagia; IV - Mechanical Soft No Mixed Consistencies; Thin       Assessment/Plan     Active Hospital Problems    Diagnosis  POA   • Severe malnutrition (CMS/HCC) [E43]  Yes   • B12 deficiency [E53.8]  Yes   • Polysubstance abuse (CMS/HCC) [F19.10]  Yes   • Opioid use disorder (CMS/HCC) [F11.99]  Yes   • Acute psychosis (CMS/HCC) [F23]  Yes   • Acute metabolic encephalopathy [G93.41]  Yes   • Fracture of right humerus [S42.301A]  Yes   • Hypokalemia [E87.6]  Yes   • Chronic pain syndrome [G89.4]  Yes   • Seizures (CMS/HCC) [R56.9]  Yes      Resolved Hospital Problems   No resolved problems to display.       50-year-old female admitted with epileptic activity and delerium.     Encephalopathy  Likely secondary to polysubstance abuse and noncompliance with antiepileptic drugs.  Neurology was consulted, severe white matter changes on MRI  likely secondary to chronic polysubstance abuse.  Autoimmune work-up negative this admission.  Psychiatry consulted, encephalopathy less likely related to opiate withdrawal.  As needed olanzapine for agitation.  Avoid benzos  Patient's  gave her opiates prior to admission, there is concerned that discharged home with her  would not be the best for the patient.  Will consult access    Seizure activity  Neurology consulted, EEG unobtainable while patient was acutely delirious.  Continue oral Keppra      Right humerus fracture  Ortho consulted, conservative management for now  ·   · SCDs for DVT prophylaxis.  · Full code.  · Discussed with patient.  · Anticipate discharge TBD.  in 1-2 days.      Rohit Pena MD  [facility]  01/23/21  13:45 EST    Patient was wearing facemask when I entered the room and throughout our encounter.  I wore protective equipment throughout this patient encounter including a face mask, gloves and protective eyewear.  Hand hygiene was performed before donning protective equipment and after removal when leaving the room.

## 2021-01-23 NOTE — NURSING NOTE
OT was working with pt at the sink. Pt went to sit down and missed the chair. OT helped ease pt to the floor. Pt not complaining of pain, VSS. MD, family and  made aware.

## 2021-01-23 NOTE — PLAN OF CARE
Problem: Adult Inpatient Plan of Care  Goal: Plan of Care Review  Outcome: Ongoing, Progressing  Flowsheets  Taken 1/23/2021 0624 by Mitchell Clark, RN  Outcome Summary: No acute changes throughout shift. VSS. Pt voiced no c/o pain, discomfort, nausea, or SOB. Pt still disoriented @ times but is quickly reoriented. Bed locked and in lowest position. Side rails up 2. Call light within reach. Posey vest in use throughout shift. Bed alarm set. Will continue to assess.  Taken 1/22/2021 0618 by Marissa Oseguera, RN  Progress: no change  Plan of Care Reviewed With: patient  Goal: Patient-Specific Goal (Individualized)  Outcome: Ongoing, Progressing  Goal: Absence of Hospital-Acquired Illness or Injury  Outcome: Ongoing, Progressing  Intervention: Identify and Manage Fall Risk  Flowsheets  Taken 1/23/2021 0620  Safety Promotion/Fall Prevention:   activity supervised   assistive device/personal items within reach   clutter free environment maintained   fall prevention program maintained   lighting adjusted   nonskid shoes/slippers when out of bed   room organization consistent   safety round/check completed  Taken 1/23/2021 0450  Safety Promotion/Fall Prevention:   activity supervised   assistive device/personal items within reach   clutter free environment maintained   fall prevention program maintained   lighting adjusted   nonskid shoes/slippers when out of bed   room organization consistent   safety round/check completed  Taken 1/23/2021 0250  Safety Promotion/Fall Prevention:   activity supervised   assistive device/personal items within reach   clutter free environment maintained   fall prevention program maintained   lighting adjusted   nonskid shoes/slippers when out of bed   room organization consistent   safety round/check completed  Taken 1/23/2021 0059  Safety Promotion/Fall Prevention:   activity supervised   assistive device/personal items within reach   clutter free environment maintained   fall prevention  program maintained   lighting adjusted   nonskid shoes/slippers when out of bed   room organization consistent   safety round/check completed  Taken 1/22/2021 2240  Safety Promotion/Fall Prevention:   activity supervised   assistive device/personal items within reach   clutter free environment maintained   fall prevention program maintained   lighting adjusted   nonskid shoes/slippers when out of bed   room organization consistent   safety round/check completed  Taken 1/22/2021 2050  Safety Promotion/Fall Prevention:   activity supervised   assistive device/personal items within reach   clutter free environment maintained   fall prevention program maintained   lighting adjusted   nonskid shoes/slippers when out of bed   room organization consistent   safety round/check completed  Intervention: Prevent Skin Injury  Flowsheets  Taken 1/23/2021 0620  Body Position: position changed independently  Taken 1/23/2021 0450  Body Position: position changed independently  Taken 1/23/2021 0250  Body Position: position changed independently  Taken 1/23/2021 0059  Body Position: position changed independently  Taken 1/22/2021 2240  Body Position: position changed independently  Taken 1/22/2021 2050  Body Position: position changed independently  Intervention: Prevent and Manage VTE (venous thromboembolism) Risk  Flowsheets  Taken 1/23/2021 0059  VTE Prevention/Management:   bilateral   dorsiflexion/plantar flexion performed  Taken 1/22/2021 2050  VTE Prevention/Management:   bilateral   dorsiflexion/plantar flexion performed  Intervention: Prevent Infection  Flowsheets  Taken 1/23/2021 0620  Infection Prevention:   environmental surveillance performed   equipment surfaces disinfected   hand hygiene promoted   rest/sleep promoted   single patient room provided   visitors restricted/screened  Taken 1/23/2021 0450  Infection Prevention:   environmental surveillance performed   equipment surfaces disinfected   hand hygiene promoted    rest/sleep promoted   single patient room provided   visitors restricted/screened  Taken 1/23/2021 0250  Infection Prevention:   environmental surveillance performed   equipment surfaces disinfected   hand hygiene promoted   rest/sleep promoted   single patient room provided   visitors restricted/screened  Taken 1/23/2021 0059  Infection Prevention:   environmental surveillance performed   equipment surfaces disinfected   hand hygiene promoted   rest/sleep promoted   single patient room provided   visitors restricted/screened  Taken 1/22/2021 2240  Infection Prevention:   environmental surveillance performed   equipment surfaces disinfected   hand hygiene promoted   rest/sleep promoted   single patient room provided   visitors restricted/screened  Taken 1/22/2021 2050  Infection Prevention:   environmental surveillance performed   equipment surfaces disinfected   hand hygiene promoted   rest/sleep promoted   single patient room provided   visitors restricted/screened  Goal: Optimal Comfort and Wellbeing  Outcome: Ongoing, Progressing  Intervention: Provide Person-Centered Care  Flowsheets  Taken 1/23/2021 0059  Trust Relationship/Rapport:   care explained   choices provided   emotional support provided   empathic listening provided   questions answered   questions encouraged   reassurance provided   thoughts/feelings acknowledged  Taken 1/22/2021 2050  Trust Relationship/Rapport:   care explained   choices provided   emotional support provided   empathic listening provided   questions answered   questions encouraged   reassurance provided   thoughts/feelings acknowledged  Goal: Readiness for Transition of Care  Outcome: Ongoing, Progressing  Intervention: Mutually Develop Transition Plan  Flowsheets  Taken 1/17/2021 1457 by Belkis Ledbetter RN  Patient/Family Anticipated Services at Transition:      rehabilitation services   mental health services  Patient/Family Anticipates Transition to: inpatient  rehabilitation facility  Taken 1/17/2021 1453 by Belkis Ledbetter, RN  Equipment Currently Used at Home: none     Problem: Fall Injury Risk  Goal: Absence of Fall and Fall-Related Injury  Outcome: Ongoing, Progressing  Intervention: Identify and Manage Contributors to Fall Injury Risk  Flowsheets  Taken 1/23/2021 0450  Medication Review/Management: medications reviewed  Taken 1/23/2021 0059  Medication Review/Management: medications reviewed  Taken 1/22/2021 2050  Medication Review/Management: medications reviewed  Intervention: Promote Injury-Free Environment  Flowsheets  Taken 1/23/2021 0620  Safety Promotion/Fall Prevention:   activity supervised   assistive device/personal items within reach   clutter free environment maintained   fall prevention program maintained   lighting adjusted   nonskid shoes/slippers when out of bed   room organization consistent   safety round/check completed  Taken 1/23/2021 0450  Safety Promotion/Fall Prevention:   activity supervised   assistive device/personal items within reach   clutter free environment maintained   fall prevention program maintained   lighting adjusted   nonskid shoes/slippers when out of bed   room organization consistent   safety round/check completed  Taken 1/23/2021 0250  Safety Promotion/Fall Prevention:   activity supervised   assistive device/personal items within reach   clutter free environment maintained   fall prevention program maintained   lighting adjusted   nonskid shoes/slippers when out of bed   room organization consistent   safety round/check completed  Taken 1/23/2021 0059  Safety Promotion/Fall Prevention:   activity supervised   assistive device/personal items within reach   clutter free environment maintained   fall prevention program maintained   lighting adjusted   nonskid shoes/slippers when out of bed   room organization consistent   safety round/check completed  Taken 1/22/2021 2240  Safety Promotion/Fall Prevention:   activity  supervised   assistive device/personal items within reach   clutter free environment maintained   fall prevention program maintained   lighting adjusted   nonskid shoes/slippers when out of bed   room organization consistent   safety round/check completed  Taken 1/22/2021 2050  Safety Promotion/Fall Prevention:   activity supervised   assistive device/personal items within reach   clutter free environment maintained   fall prevention program maintained   lighting adjusted   nonskid shoes/slippers when out of bed   room organization consistent   safety round/check completed     Problem: Activity and Energy Impairment (Excessive Substance Use)  Goal: Optimized Energy Level (Excessive Substance Use)  Outcome: Ongoing, Progressing  Intervention: Optimize Energy Level  Flowsheets (Taken 1/23/2021 0624)  Activity (Behavioral Health): activity adjusted per tolerance  Diversional Activity: television     Problem: Behavior Regulation Impairment (Excessive Substance Use)  Goal: Improved Behavioral Control (Excessive Substance Use)  Outcome: Ongoing, Progressing  Intervention: Promote Behavior and Impulse Control  Flowsheets  Taken 1/23/2021 0059  Behavior Management: behavioral plan reviewed  Taken 1/22/2021 2050  Behavior Management: behavioral plan reviewed     Problem: Decreased Participation and Engagement (Excessive Substance Use)  Goal: Increased Participation and Engagement (Excessive Substance Use)  Outcome: Ongoing, Progressing  Intervention: Facilitate Participation and Engagement  Flowsheets  Taken 1/23/2021 0624  Diversional Activity: television  Taken 1/23/2021 0059  Supportive Measures:   active listening utilized   relaxation techniques promoted   verbalization of feelings encouraged  Taken 1/22/2021 2050  Supportive Measures:   active listening utilized   relaxation techniques promoted   verbalization of feelings encouraged     Problem: Physiologic Impairment (Excessive Substance Use)  Goal: Improved  Physiologic Symptoms (Excessive Substance Use)  Outcome: Ongoing, Progressing     Problem: Social, Occupational or Functional Impairment (Excessive Substance Use)  Goal: Enhanced Social, Occupational or Functional Skills (Excessive Substance Use)  Outcome: Ongoing, Progressing  Intervention: Promote Social, Occupational and Functional Ability  Flowsheets  Taken 1/23/2021 0059 by Mitchell Clark RN  Trust Relationship/Rapport:   care explained   choices provided   emotional support provided   empathic listening provided   questions answered   questions encouraged   reassurance provided   thoughts/feelings acknowledged  Taken 1/22/2021 2050 by Mitchell Clark RN  Trust Relationship/Rapport:   care explained   choices provided   emotional support provided   empathic listening provided   questions answered   questions encouraged   reassurance provided   thoughts/feelings acknowledged  Taken 1/20/2021 0800 by Yocasta Stiles RN  Social Functional Ability Promotion: autonomy promoted     Problem: Skin Injury Risk Increased  Goal: Skin Health and Integrity  Outcome: Ongoing, Progressing  Intervention: Optimize Skin Protection  Flowsheets  Taken 1/23/2021 0620  Head of Bed (HOB): HOB flat  Taken 1/23/2021 0450  Head of Bed (HOB):   HOB flat   HOB lowered  Taken 1/23/2021 0250  Head of Bed (HOB):   HOB flat   HOB lowered  Taken 1/23/2021 0059  Pressure Reduction Techniques:   frequent weight shift encouraged   weight shift assistance provided  Head of Bed (HOB): HOB elevated  Pressure Reduction Devices:   alternating pressure pump (ADD)   positioning supports utilized  Skin Protection:   adhesive use limited   incontinence pads utilized  Taken 1/22/2021 2240  Head of Bed (HOB): HOB elevated  Taken 1/22/2021 2050  Pressure Reduction Techniques:   frequent weight shift encouraged   weight shift assistance provided  Head of Bed (HOB): HOB elevated  Pressure Reduction Devices:   alternating pressure pump (ADD)   positioning supports  utilized  Skin Protection:   adhesive use limited   incontinence pads utilized   tubing/devices free from skin contact  Intervention: Promote and Optimize Oral Intake  Flowsheets (Taken 1/23/2021 0624)  Oral Nutrition Promotion:   medicated   rest periods promoted     Problem: Oral Intake Inadequate  Goal: Improved Oral Intake  Outcome: Ongoing, Progressing  Intervention: Promote and Optimize Oral Intake  Flowsheets (Taken 1/23/2021 0624)  Oral Nutrition Promotion:   medicated   rest periods promoted     Problem: Restraint, Nonbehavioral (Nonviolent)  Goal: Discontinuation Criteria Achieved  Outcome: Ongoing, Progressing  Intervention: Implement Least-restrictive Safety Strategies  Flowsheets  Taken 1/23/2021 0059  Diversional Activities: television  Taken 1/22/2021 2050  Diversional Activities: television  Goal: Personal Dignity and Safety Maintained  Outcome: Ongoing, Progressing  Intervention: Protect Dignity, Rights, and Personal Wellbeing  Flowsheets  Taken 1/23/2021 0059  Trust Relationship/Rapport:   care explained   choices provided   emotional support provided   empathic listening provided   questions answered   questions encouraged   reassurance provided   thoughts/feelings acknowledged  Taken 1/22/2021 2050  Trust Relationship/Rapport:   care explained   choices provided   emotional support provided   empathic listening provided   questions answered   questions encouraged   reassurance provided   thoughts/feelings acknowledged  Intervention: Protect Skin and Joint Integrity  Flowsheets  Taken 1/23/2021 0620  Body Position: position changed independently  Taken 1/23/2021 0450  Body Position: position changed independently  Taken 1/23/2021 0250  Body Position: position changed independently  Taken 1/23/2021 0059  Body Position: position changed independently  Taken 1/22/2021 2240  Body Position: position changed independently  Taken 1/22/2021 2050  Body Position: position changed independently   Goal  Outcome Evaluation:  Plan of Care Reviewed With: patient  Progress: no change  Outcome Summary: No acute changes throughout shift. VSS. Pt voiced no c/o pain, discomfort, nausea, or SOB. Pt still disoriented @ times but is quickly reoriented. Bed locked and in lowest position. Side rails up 2. Call light within reach. Posey vest in use throughout shift. Bed alarm set. Will continue to assess.

## 2021-01-23 NOTE — PLAN OF CARE
Goal Outcome Evaluation:  Plan of Care Reviewed With: patient  Progress: no change  Outcome Summary: OT evaluation complete: Pt is a 50 year old female admitted to the hospital for seizures with dx of acute metabolic encephalopathy. Pt previously independent with adls and functional transfers with no AD. On this date, pt presents with decresed strength and activity tolerance with functional transfers and adls. Pt with decreased descend down to floor during therapy session as pt attempted to reach for chair that was not in close enough distance. OT assisted pt down to floor and pt reported no injuries. Nsg notified and assessed pt after assisting OT to help patient up. SAFE report completed. Pt will benefit from skilled OT services to address the noted deficits. Anticipate SNF vs HH pending patient progress.    Pt wore face mask during this therapy encounter. Therapist wore appropriate PPE including face mask, gloves, and eye protection. Hand hygiene completed before and after therapy session. Pt not in enhanced precautions.
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
## 2021-01-24 LAB
ANION GAP SERPL CALCULATED.3IONS-SCNC: 8.3 MMOL/L (ref 5–15)
BASOPHILS # BLD AUTO: 0.02 10*3/MM3 (ref 0–0.2)
BASOPHILS NFR BLD AUTO: 0.2 % (ref 0–1.5)
BUN SERPL-MCNC: 17 MG/DL (ref 6–20)
BUN/CREAT SERPL: 45.9 (ref 7–25)
CALCIUM SPEC-SCNC: 9.2 MG/DL (ref 8.6–10.5)
CHLORIDE SERPL-SCNC: 105 MMOL/L (ref 98–107)
CO2 SERPL-SCNC: 24.7 MMOL/L (ref 22–29)
CREAT SERPL-MCNC: 0.37 MG/DL (ref 0.57–1)
DEPRECATED RDW RBC AUTO: 41 FL (ref 37–54)
EOSINOPHIL # BLD AUTO: 0.03 10*3/MM3 (ref 0–0.4)
EOSINOPHIL NFR BLD AUTO: 0.3 % (ref 0.3–6.2)
ERYTHROCYTE [DISTWIDTH] IN BLOOD BY AUTOMATED COUNT: 12.9 % (ref 12.3–15.4)
GFR SERPL CREATININE-BSD FRML MDRD: >150 ML/MIN/1.73
GLUCOSE SERPL-MCNC: 114 MG/DL (ref 65–99)
HCT VFR BLD AUTO: 34.3 % (ref 34–46.6)
HGB BLD-MCNC: 11.7 G/DL (ref 12–15.9)
IMM GRANULOCYTES # BLD AUTO: 0.04 10*3/MM3 (ref 0–0.05)
IMM GRANULOCYTES NFR BLD AUTO: 0.4 % (ref 0–0.5)
LYMPHOCYTES # BLD AUTO: 1.04 10*3/MM3 (ref 0.7–3.1)
LYMPHOCYTES NFR BLD AUTO: 11.3 % (ref 19.6–45.3)
MCH RBC QN AUTO: 30 PG (ref 26.6–33)
MCHC RBC AUTO-ENTMCNC: 34.1 G/DL (ref 31.5–35.7)
MCV RBC AUTO: 87.9 FL (ref 79–97)
MONOCYTES # BLD AUTO: 0.46 10*3/MM3 (ref 0.1–0.9)
MONOCYTES NFR BLD AUTO: 5 % (ref 5–12)
NEUTROPHILS NFR BLD AUTO: 7.59 10*3/MM3 (ref 1.7–7)
NEUTROPHILS NFR BLD AUTO: 82.8 % (ref 42.7–76)
NRBC BLD AUTO-RTO: 0 /100 WBC (ref 0–0.2)
PLATELET # BLD AUTO: 496 10*3/MM3 (ref 140–450)
PMV BLD AUTO: 8.6 FL (ref 6–12)
POTASSIUM SERPL-SCNC: 3.9 MMOL/L (ref 3.5–5.2)
RBC # BLD AUTO: 3.9 10*6/MM3 (ref 3.77–5.28)
SODIUM SERPL-SCNC: 138 MMOL/L (ref 136–145)
WBC # BLD AUTO: 9.18 10*3/MM3 (ref 3.4–10.8)

## 2021-01-24 PROCEDURE — 85025 COMPLETE CBC W/AUTO DIFF WBC: CPT | Performed by: INTERNAL MEDICINE

## 2021-01-24 PROCEDURE — 97162 PT EVAL MOD COMPLEX 30 MIN: CPT

## 2021-01-24 PROCEDURE — 25010000002 CYANOCOBALAMIN PER 1000 MCG: Performed by: PSYCHIATRY & NEUROLOGY

## 2021-01-24 PROCEDURE — 97110 THERAPEUTIC EXERCISES: CPT

## 2021-01-24 PROCEDURE — 80048 BASIC METABOLIC PNL TOTAL CA: CPT | Performed by: INTERNAL MEDICINE

## 2021-01-24 RX ADMIN — LEVETIRACETAM 1000 MG: 500 TABLET, FILM COATED ORAL at 20:10

## 2021-01-24 RX ADMIN — Medication 1 PATCH: at 08:45

## 2021-01-24 RX ADMIN — CYANOCOBALAMIN 1000 MCG: 1000 INJECTION INTRAMUSCULAR; SUBCUTANEOUS at 08:42

## 2021-01-24 RX ADMIN — LEVETIRACETAM 1000 MG: 500 TABLET, FILM COATED ORAL at 08:42

## 2021-01-24 RX ADMIN — ACETAMINOPHEN 650 MG: 325 TABLET, FILM COATED ORAL at 17:57

## 2021-01-24 RX ADMIN — ACETAMINOPHEN 650 MG: 325 TABLET, FILM COATED ORAL at 01:01

## 2021-01-24 NOTE — PLAN OF CARE
Goal Outcome Evaluation:  Plan of Care Reviewed With: patient  Progress: no change  Outcome Summary: Pt is a 51 yo male admitted with seizures and metabolic encephalopathy. Pt has history of polysubstance abuse and R humerus fracture. Pt to wear R sling with mobility. Today, pt is SBA for bed mobility, CGA x2 for transfers and ambulation. Assist x2 for safety as pt is impulsive. Pt may benefit from skilled PT acutely to address balance, endurance and general functional mobility deficits. Recommend d/c home with assist and HH vs SNF pending progress.    Patient was intermittently wearing a face mask during this therapy encounter. Therapist used appropriate personal protective equipment including eye protection, mask, and gloves.  Mask used was standard procedure mask. Appropriate PPE was worn during the entire therapy session. Hand hygiene was completed before and after therapy session. Patient is not in enhanced droplet precautions. Prasad Louis present.

## 2021-01-24 NOTE — PROGRESS NOTES
Name: Mary Chavez ADMIT: 2021   : 1970  PCP: Murphy Carty MD    MRN: [id number] LOS: 5 days   AGE/SEX: 50 y.o. female  ROOM: [facility]     Subjective   Subjective   Seen and examined at bedside.  No acute events overnight.  Was unable to ambulate with PT for 8 feet.    Review of Systems   Constitutional: Negative.    Respiratory: Negative.    Cardiovascular: Negative.    Gastrointestinal: Negative.    Neurological: Positive for seizures.        Objective   Objective   Vital Signs  Temp:  [97.4 °F (36.3 °C)-98.2 °F (36.8 °C)] 97.8 °F (36.6 °C)  Heart Rate:  [] 112  Resp:  [18] 18  BP: (121-133)/(71-85) 127/81  SpO2:  [100 %] 100 %  on   ;   Device (Oxygen Therapy): room air  Body mass index is 19.18 kg/m².  Physical Exam  Constitutional:       Appearance: Normal appearance.   HENT:      Head: Normocephalic and atraumatic.   Cardiovascular:      Pulses: Normal pulses.      Heart sounds: Normal heart sounds.   Pulmonary:      Effort: Pulmonary effort is normal.      Breath sounds: Normal breath sounds.   Musculoskeletal:      Comments: Right upper extremity in sling, tender to palpation   Skin:     General: Skin is warm and dry.   Neurological:      General: No focal deficit present.      Mental Status: She is alert and oriented to person, place, and time.         Results Review     I reviewed the patient's new clinical results.  Results from last 7 days   Lab Units 21  0636 21  0631 21  0659 21  0821   WBC 10*3/mm3 9.18 11.26* 7.21 7.17   HEMOGLOBIN g/dL 11.7* 11.7* 10.3* 10.6*   PLATELETS 10*3/mm3 496* 394 237 309     Results from last 7 days   Lab Units 21  0636 21  1305 21  1209 21  0821   SODIUM mmol/L 138 137 138 135*   POTASSIUM mmol/L 3.9 3.6 4.1 2.9*   CHLORIDE mmol/L 105 103 107 102   CO2 mmol/L 24.7 20.6* 21.5* 21.5*   BUN mg/dL 17 13 10 5*   CREATININE mg/dL 0.37* 0.37* 0.48* 0.34*   GLUCOSE mg/dL 114* 84 94 86   Estimated  Creatinine Clearance: 153.1 mL/min (A) (by C-G formula based on SCr of 0.37 mg/dL (L)).    Results from last 7 days   Lab Units 01/24/21  0636 01/23/21  1305 01/22/21  1209 01/21/21  0821 01/20/21  0302  01/18/21  0623   CALCIUM mg/dL 9.2 9.0 8.9 8.5* 8.3*   < > 8.2*   MAGNESIUM mg/dL  --   --   --   --  1.8  --  1.8    < > = values in this interval not displayed.       COVID19   Date Value Ref Range Status   01/17/2021 Not Detected Not Detected - Ref. Range Final   07/25/2020 Not Detected Not Detected - Ref. Range Final     No results found for: HGBA1C, POCGLU    CT CHEST WITHOUT CONTRAST DIAGNOSTIC  Narrative: CT CHEST, ABDOMEN, AND PELVIS WITHOUT CONTRAST.     HISTORY: 50-year-old female with unintentional weight loss. Gastric  bypass and hysterectomy in the past.     TECHNIQUE: Radiation dose reduction techniques were utilized, including  automated exposure control and exposure modulation based on body size.   3 mm images were obtained through the chest, abdomen, and pelvis without  the administration of IV contrast. Compared with CTs from 06/16/2020     FINDINGS:  CHEST: There are no suspicious pulmonary opacities or nodules. There are  no pleural or pericardial effusions. There is no lymphadenopathy within  the chest. A subacute comminuted right humeral neck fracture with  surrounding edema is noted. There are multiple old as well as healing  right rib fractures.     ABDOMEN/PELVIS: There is streak artifact from the patient's arms. The  patient was unable to raise the arms overhead. Noncontrasted liver and  gallbladder appear unremarkable. Noncontrasted spleen, pancreas,  adrenals, and kidneys appear unremarkable. There is significant air  distention of the segment of bowel which has the primary small bowel  anastomosis. The segment has been distended in the past, but to a  smaller degree. The colon is essentially devoid of formed stool and is  nearly completely collapsed. No definitive bowel thickening is  seen.  There is a small amount of air within the vaginal canal and there is  also a small amount of air within the nondependent aspect of the urinary  bladder. There are mild abdominal aortic atherosclerotic changes without  aneurysmal dilatation. There is significant diffuse osseous  demineralization. Since the previous CT, there has been development of  an L3 compression fracture with complete loss of height at the mid  aspect of the vertebral body with approximately 8 mm retropulsion into  the spinal canal. (Fracture at L5 and kyphoplasty changes at L2, T12,  and T11 appear unchanged.     Impression: 1. The colon is nearly completely collapsed and devoid of formed stool,  but there is no evidence for colitis. The segment of small bowel which  has the primary small bowel anastomosis related to the gastric bypass in  the past is significantly dilated, more so than on the previous  examinations. There is likely a small bowel ileus or possibly a  low-grade obstruction. Follow-up is recommended.  2. Air within the vaginal canal and urinary bladder may be iatrogenic,  but otherwise fistulas with bowel are possible. Please correlate  clinically.  3. New L3 compression fracture as described since the previous CT from  06/16/2020.  4. Subacute comminuted right humeral neck fracture. Multiple old and  healing right rib fractures.     This report was finalized on 1/22/2021 5:10 PM by Dr. Cindi Mercado M.D.     CT Abdomen Pelvis Without Contrast  Narrative: CT CHEST, ABDOMEN, AND PELVIS WITHOUT CONTRAST.     HISTORY: 50-year-old female with unintentional weight loss. Gastric  bypass and hysterectomy in the past.     TECHNIQUE: Radiation dose reduction techniques were utilized, including  automated exposure control and exposure modulation based on body size.   3 mm images were obtained through the chest, abdomen, and pelvis without  the administration of IV contrast. Compared with CTs from 06/16/2020     FINDINGS:  CHEST: There  are no suspicious pulmonary opacities or nodules. There are  no pleural or pericardial effusions. There is no lymphadenopathy within  the chest. A subacute comminuted right humeral neck fracture with  surrounding edema is noted. There are multiple old as well as healing  right rib fractures.     ABDOMEN/PELVIS: There is streak artifact from the patient's arms. The  patient was unable to raise the arms overhead. Noncontrasted liver and  gallbladder appear unremarkable. Noncontrasted spleen, pancreas,  adrenals, and kidneys appear unremarkable. There is significant air  distention of the segment of bowel which has the primary small bowel  anastomosis. The segment has been distended in the past, but to a  smaller degree. The colon is essentially devoid of formed stool and is  nearly completely collapsed. No definitive bowel thickening is seen.  There is a small amount of air within the vaginal canal and there is  also a small amount of air within the nondependent aspect of the urinary  bladder. There are mild abdominal aortic atherosclerotic changes without  aneurysmal dilatation. There is significant diffuse osseous  demineralization. Since the previous CT, there has been development of  an L3 compression fracture with complete loss of height at the mid  aspect of the vertebral body with approximately 8 mm retropulsion into  the spinal canal. (Fracture at L5 and kyphoplasty changes at L2, T12,  and T11 appear unchanged.     Impression: 1. The colon is nearly completely collapsed and devoid of formed stool,  but there is no evidence for colitis. The segment of small bowel which  has the primary small bowel anastomosis related to the gastric bypass in  the past is significantly dilated, more so than on the previous  examinations. There is likely a small bowel ileus or possibly a  low-grade obstruction. Follow-up is recommended.  2. Air within the vaginal canal and urinary bladder may be iatrogenic,  but otherwise  fistulas with bowel are possible. Please correlate  clinically.  3. New L3 compression fracture as described since the previous CT from  06/16/2020.  4. Subacute comminuted right humeral neck fracture. Multiple old and  healing right rib fractures.     This report was finalized on 1/22/2021 5:10 PM by Dr. Cindi Mercado M.D.       Scheduled Medications  cyanocobalamin, 1,000 mcg, Intramuscular, Daily  levETIRAcetam, 1,000 mg, Oral, Q12H  nicotine, 1 patch, Transdermal, Q24H  sodium chloride, 10 mL, Intravenous, Q12H  temazepam, 30 mg, Oral, Once    Infusions   Diet  Diet Dysphagia; IV - Mechanical Soft No Mixed Consistencies; Thin       Assessment/Plan     Active Hospital Problems    Diagnosis  POA   • Severe malnutrition (CMS/HCC) [E43]  Yes   • B12 deficiency [E53.8]  Yes   • Polysubstance abuse (CMS/HCC) [F19.10]  Yes   • Opioid use disorder (CMS/HCC) [F11.99]  Yes   • Acute psychosis (CMS/HCC) [F23]  Yes   • Acute metabolic encephalopathy [G93.41]  Yes   • Fracture of right humerus [S42.301A]  Yes   • Hypokalemia [E87.6]  Yes   • Chronic pain syndrome [G89.4]  Yes   • Seizures (CMS/HCC) [R56.9]  Yes      Resolved Hospital Problems   No resolved problems to display.       50 y.o. female admitted with <principal problem not specified>.       50-year-old female admitted with epileptic activity and delerium.      Encephalopathy  Likely secondary to polysubstance abuse and noncompliance with antiepileptic drugs.  Neurology was consulted, severe white matter changes on MRI likely secondary to chronic polysubstance abuse.  Autoimmune work-up negative this admission.  Psychiatry consulted, encephalopathy less likely related to opiate withdrawal.  As needed olanzapine for agitation.  Avoid benzos  -Improved.     Seizure activity  Neurology consulted, EEG unobtainable while patient was acutely delirious.  Continue oral Keppra    Debility   PT OT evaluation recommended rehab        Right humerus fracture  Ortho consulted,  conservative management for now   · SCDs for DVT prophylaxis.  · Full code.  · Discussed with patient.  · Anticipate discharge to rehab, possibly tomorrow .    Rohit Pena MD  [facility]  01/24/21  15:45 EST    Patient was wearing facemask when I entered the room and throughout our encounter.  I wore protective equipment throughout this patient encounter including a face mask, gloves and protective eyewear.  Hand hygiene was performed before donning protective equipment and after removal when leaving the room.
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
## 2021-01-24 NOTE — PLAN OF CARE
Problem: Adult Inpatient Plan of Care  Goal: Plan of Care Review  Outcome: Ongoing, Progressing  Flowsheets (Taken 1/24/2021 0439)  Plan of Care Reviewed With: patient  Outcome Summary: A/O, VSS, meds taken whole with water, pain medication given once this shift r/t right shoulder and arm pain. Had 2 BM's this shift, encouraged pt to use call light and attempt to use BSC if possible. Stated that she is looking forward to going home. Will continue to monitor.   Goal Outcome Evaluation:  Plan of Care Reviewed With: patient  Progress: no change  Outcome Summary: A/O, VSS, meds taken whole with water, pain medication given once this shift r/t right shoulder and arm pain. Had 2 BM's this shift, encouraged pt to use call light and attempt to use BSC if possible. Stated that she is looking forward to going home. Will continue to monitor.

## 2021-01-24 NOTE — PROGRESS NOTES
The patient is cooperative with care and no longer requiring a Posey restraint.  Her mood is euthymic, and she is fully oriented.  She is looking forward to discharge.

## 2021-01-25 VITALS
HEIGHT: 66 IN | BODY MASS INDEX: 18.96 KG/M2 | TEMPERATURE: 98 F | SYSTOLIC BLOOD PRESSURE: 140 MMHG | WEIGHT: 117.95 LBS | OXYGEN SATURATION: 100 % | DIASTOLIC BLOOD PRESSURE: 86 MMHG | RESPIRATION RATE: 18 BRPM | HEART RATE: 104 BPM

## 2021-01-25 LAB
ANION GAP SERPL CALCULATED.3IONS-SCNC: 13 MMOL/L (ref 5–15)
BASOPHILS # BLD AUTO: 0.03 10*3/MM3 (ref 0–0.2)
BASOPHILS NFR BLD AUTO: 0.3 % (ref 0–1.5)
BUN SERPL-MCNC: 13 MG/DL (ref 6–20)
BUN/CREAT SERPL: 32.5 (ref 7–25)
CALCIUM SPEC-SCNC: 8.9 MG/DL (ref 8.6–10.5)
CHLORIDE SERPL-SCNC: 101 MMOL/L (ref 98–107)
CO2 SERPL-SCNC: 22 MMOL/L (ref 22–29)
CREAT SERPL-MCNC: 0.4 MG/DL (ref 0.57–1)
DEPRECATED RDW RBC AUTO: 39.8 FL (ref 37–54)
EOSINOPHIL # BLD AUTO: 0.03 10*3/MM3 (ref 0–0.4)
EOSINOPHIL NFR BLD AUTO: 0.3 % (ref 0.3–6.2)
ERYTHROCYTE [DISTWIDTH] IN BLOOD BY AUTOMATED COUNT: 12.6 % (ref 12.3–15.4)
GFR SERPL CREATININE-BSD FRML MDRD: >150 ML/MIN/1.73
GLUCOSE SERPL-MCNC: 121 MG/DL (ref 65–99)
HCT VFR BLD AUTO: 35.3 % (ref 34–46.6)
HGB BLD-MCNC: 12.1 G/DL (ref 12–15.9)
IMM GRANULOCYTES # BLD AUTO: 0.08 10*3/MM3 (ref 0–0.05)
IMM GRANULOCYTES NFR BLD AUTO: 0.8 % (ref 0–0.5)
LYMPHOCYTES # BLD AUTO: 1.63 10*3/MM3 (ref 0.7–3.1)
LYMPHOCYTES NFR BLD AUTO: 16.5 % (ref 19.6–45.3)
MCH RBC QN AUTO: 30 PG (ref 26.6–33)
MCHC RBC AUTO-ENTMCNC: 34.3 G/DL (ref 31.5–35.7)
MCV RBC AUTO: 87.4 FL (ref 79–97)
MONOCYTES # BLD AUTO: 0.6 10*3/MM3 (ref 0.1–0.9)
MONOCYTES NFR BLD AUTO: 6.1 % (ref 5–12)
NEUTROPHILS NFR BLD AUTO: 7.52 10*3/MM3 (ref 1.7–7)
NEUTROPHILS NFR BLD AUTO: 76 % (ref 42.7–76)
NRBC BLD AUTO-RTO: 0 /100 WBC (ref 0–0.2)
PLATELET # BLD AUTO: 568 10*3/MM3 (ref 140–450)
PMV BLD AUTO: 9.1 FL (ref 6–12)
POTASSIUM SERPL-SCNC: 3.9 MMOL/L (ref 3.5–5.2)
RBC # BLD AUTO: 4.04 10*6/MM3 (ref 3.77–5.28)
SODIUM SERPL-SCNC: 136 MMOL/L (ref 136–145)
WBC # BLD AUTO: 9.89 10*3/MM3 (ref 3.4–10.8)

## 2021-01-25 PROCEDURE — 25010000002 CYANOCOBALAMIN PER 1000 MCG: Performed by: PSYCHIATRY & NEUROLOGY

## 2021-01-25 PROCEDURE — 80048 BASIC METABOLIC PNL TOTAL CA: CPT | Performed by: INTERNAL MEDICINE

## 2021-01-25 PROCEDURE — 85025 COMPLETE CBC W/AUTO DIFF WBC: CPT | Performed by: INTERNAL MEDICINE

## 2021-01-25 RX ORDER — OLANZAPINE 10 MG/1
5 INJECTION, POWDER, LYOPHILIZED, FOR SOLUTION INTRAMUSCULAR EVERY 8 HOURS PRN
Start: 2021-01-25 | End: 2021-04-15 | Stop reason: HOSPADM

## 2021-01-25 RX ADMIN — SODIUM CHLORIDE, PRESERVATIVE FREE 10 ML: 5 INJECTION INTRAVENOUS at 09:48

## 2021-01-25 RX ADMIN — CYANOCOBALAMIN 1000 MCG: 1000 INJECTION INTRAMUSCULAR; SUBCUTANEOUS at 09:46

## 2021-01-25 RX ADMIN — Medication 1 PATCH: at 09:49

## 2021-01-25 RX ADMIN — LEVETIRACETAM 1000 MG: 500 TABLET, FILM COATED ORAL at 09:46

## 2021-01-25 NOTE — DISCHARGE SUMMARY
Patient Name: Mary Chavez  : 1970  MRN: [id number]    Date of Admission: 2021  Date of Discharge:  2021  Primary Care Physician: Murphy Carty MD      Chief Complaint:   Seizures      Discharge Diagnoses     Active Hospital Problems    Diagnosis  POA   • Severe malnutrition (CMS/HCC) [E43]  Yes   • B12 deficiency [E53.8]  Yes   • Polysubstance abuse (CMS/HCC) [F19.10]  Yes   • Opioid use disorder (CMS/HCC) [F11.99]  Yes   • Acute psychosis (CMS/HCC) [F23]  Yes   • Acute metabolic encephalopathy [G93.41]  Yes   • Fracture of right humerus [S42.301A]  Yes   • Hypokalemia [E87.6]  Yes   • Chronic pain syndrome [G89.4]  Yes   • Seizures (CMS/HCC) [R56.9]  Yes      Resolved Hospital Problems   No resolved problems to display.        Hospital Course     Ms. Chavez is a 50 y.o. female with a history of seizures, medication noncompliance, polysubstance abuse, anemia and anxiety who presented to [facility] with altered mental status after missing a dose of keppra and taking illegal drugs.  Please see the admitting history and physical for further details.  She was found to have metabolic encephalopathy and epileptic versus nonepileptic seizures. She was admitted to the hospital for further evaluation and treatment. She was evaluated by neurology who ordered extensive workup that remained largely unremarkable. Encephalopathy was not thought to be secondary to opiate withdrawal. She was placed on IV Keppra in which was later transitioned to PO. Metabolic encephalopathy and severe white matter changes found on MRI likely related to long term polysubstance abuse. She required restraints for some time during hospitalization for safety. She underwent autoimmune workup in which was also negative. An EEG was unable to be preformed as she could not tolerate x2 and given improvement was not re-ordered. She slowly made improvement and is currently back to baseline mentation. Psychiatry  evaluated the patient and added zyprexa and ativan with decent response. Vitamin b12 on low end and she was given IM replacement. Apparently the patient recently broke her right humerus and was seen by an orthopedic surgeon who suggests conservative management with sling, ice and pain control. PT evaluated and recommends skilled PT at the time of discharge. She will be sent to [facility] in stable condition and should follow up with her PCP in 1-2 weeks.      Day of Discharge     Subjective:  no new complaints or events overnight. she is more than ready to go.     Review of Systems   Constitutional: Negative for chills and fever.   HENT: Positive for dental problem. Negative for congestion and sore throat.    Eyes: Negative for discharge and itching.   Respiratory: Negative for cough and shortness of breath.    Cardiovascular: Negative for chest pain and palpitations.   Gastrointestinal: Negative for abdominal pain, diarrhea, nausea and vomiting.   Endocrine: Negative for cold intolerance and heat intolerance.   Genitourinary: Negative for difficulty urinating and dysuria.   Musculoskeletal: Negative for back pain and gait problem.   Skin: Negative for color change and pallor.   Allergic/Immunologic: Negative for environmental allergies and immunocompromised state.   Neurological: Negative for dizziness and headaches.   Hematological: Negative for adenopathy. Does not bruise/bleed easily.   Psychiatric/Behavioral: Negative for agitation and behavioral problems.       Physical Exam:  Temp:  [97.4 °F (36.3 °C)-98.4 °F (36.9 °C)] 98 °F (36.7 °C)  Heart Rate:  [] 94  Resp:  [16-18] 18  BP: (127-141)/(81-92) 141/92  Body mass index is 19.05 kg/m².  Physical Exam  Vitals signs and nursing note reviewed.   Constitutional:       Appearance: She is ill-appearing (chronically).      Comments: appears older than stated age.    HENT:      Head: Normocephalic and atraumatic.   Eyes:      Conjunctiva/sclera:  Conjunctivae normal.   Neck:      Musculoskeletal: Normal range of motion and neck supple.   Cardiovascular:      Rate and Rhythm: Normal rate and regular rhythm.   Pulmonary:      Effort: Pulmonary effort is normal. No respiratory distress.      Breath sounds: Normal breath sounds.   Abdominal:      General: Bowel sounds are normal. There is no distension.      Palpations: Abdomen is soft.      Tenderness: There is no abdominal tenderness.   Musculoskeletal: Normal range of motion.         General: Tenderness present. No swelling.   Skin:     General: Skin is warm and dry.   Neurological:      Mental Status: She is alert and oriented to person, place, and time. Mental status is at baseline.   Psychiatric:         Mood and Affect: Mood normal.         Behavior: Behavior normal.         Consultants     Consult Orders (all) (From admission, onward)     Start     Ordered    01/17/21 1448  Inpatient Orthopedic Surgery Consult  Once     Specialty:  Orthopedic Surgery  Provider:  Carlos Manuel Butt MD    01/17/21 1448    01/17/21 1343  Inpatient Neurology Consult General  Once     Specialty:  Neurology  Provider:  Chad Lance MD    01/17/21 1343    01/17/21 1343  Inpatient Psychiatrist Consult  Once     Specialty:  Psychiatry  Provider:  Galen Forbes III, MD    01/17/21 1343              Procedures     Imaging Results (All)     Procedure Component Value Units Date/Time    CT CHEST WITHOUT CONTRAST DIAGNOSTIC [[id number]] Collected: 01/22/21 1531     Updated: 01/22/21 1713    Narrative:      CT CHEST, ABDOMEN, AND PELVIS WITHOUT CONTRAST.     HISTORY: 50-year-old female with unintentional weight loss. Gastric  bypass and hysterectomy in the past.     TECHNIQUE: Radiation dose reduction techniques were utilized, including  automated exposure control and exposure modulation based on body size.   3 mm images were obtained through the chest, abdomen, and pelvis without  the administration of IV contrast.  Compared with CTs from 06/16/2020     FINDINGS:  CHEST: There are no suspicious pulmonary opacities or nodules. There are  no pleural or pericardial effusions. There is no lymphadenopathy within  the chest. A subacute comminuted right humeral neck fracture with  surrounding edema is noted. There are multiple old as well as healing  right rib fractures.     ABDOMEN/PELVIS: There is streak artifact from the patient's arms. The  patient was unable to raise the arms overhead. Noncontrasted liver and  gallbladder appear unremarkable. Noncontrasted spleen, pancreas,  adrenals, and kidneys appear unremarkable. There is significant air  distention of the segment of bowel which has the primary small bowel  anastomosis. The segment has been distended in the past, but to a  smaller degree. The colon is essentially devoid of formed stool and is  nearly completely collapsed. No definitive bowel thickening is seen.  There is a small amount of air within the vaginal canal and there is  also a small amount of air within the nondependent aspect of the urinary  bladder. There are mild abdominal aortic atherosclerotic changes without  aneurysmal dilatation. There is significant diffuse osseous  demineralization. Since the previous CT, there has been development of  an L3 compression fracture with complete loss of height at the mid  aspect of the vertebral body with approximately 8 mm retropulsion into  the spinal canal. (Fracture at L5 and kyphoplasty changes at L2, T12,  and T11 appear unchanged.       Impression:      1. The colon is nearly completely collapsed and devoid of formed stool,  but there is no evidence for colitis. The segment of small bowel which  has the primary small bowel anastomosis related to the gastric bypass in  the past is significantly dilated, more so than on the previous  examinations. There is likely a small bowel ileus or possibly a  low-grade obstruction. Follow-up is recommended.  2. Air within the  vaginal canal and urinary bladder may be iatrogenic,  but otherwise fistulas with bowel are possible. Please correlate  clinically.  3. New L3 compression fracture as described since the previous CT from  06/16/2020.  4. Subacute comminuted right humeral neck fracture. Multiple old and  healing right rib fractures.     This report was finalized on 1/22/2021 5:10 PM by Dr. Cindi Mercado M.D.       CT Abdomen Pelvis Without Contrast [[id number]] Collected: 01/22/21 1531     Updated: 01/22/21 1713    Narrative:      CT CHEST, ABDOMEN, AND PELVIS WITHOUT CONTRAST.     HISTORY: 50-year-old female with unintentional weight loss. Gastric  bypass and hysterectomy in the past.     TECHNIQUE: Radiation dose reduction techniques were utilized, including  automated exposure control and exposure modulation based on body size.   3 mm images were obtained through the chest, abdomen, and pelvis without  the administration of IV contrast. Compared with CTs from 06/16/2020     FINDINGS:  CHEST: There are no suspicious pulmonary opacities or nodules. There are  no pleural or pericardial effusions. There is no lymphadenopathy within  the chest. A subacute comminuted right humeral neck fracture with  surrounding edema is noted. There are multiple old as well as healing  right rib fractures.     ABDOMEN/PELVIS: There is streak artifact from the patient's arms. The  patient was unable to raise the arms overhead. Noncontrasted liver and  gallbladder appear unremarkable. Noncontrasted spleen, pancreas,  adrenals, and kidneys appear unremarkable. There is significant air  distention of the segment of bowel which has the primary small bowel  anastomosis. The segment has been distended in the past, but to a  smaller degree. The colon is essentially devoid of formed stool and is  nearly completely collapsed. No definitive bowel thickening is seen.  There is a small amount of air within the vaginal canal and there is  also a small amount of air  within the nondependent aspect of the urinary  bladder. There are mild abdominal aortic atherosclerotic changes without  aneurysmal dilatation. There is significant diffuse osseous  demineralization. Since the previous CT, there has been development of  an L3 compression fracture with complete loss of height at the mid  aspect of the vertebral body with approximately 8 mm retropulsion into  the spinal canal. (Fracture at L5 and kyphoplasty changes at L2, T12,  and T11 appear unchanged.       Impression:      1. The colon is nearly completely collapsed and devoid of formed stool,  but there is no evidence for colitis. The segment of small bowel which  has the primary small bowel anastomosis related to the gastric bypass in  the past is significantly dilated, more so than on the previous  examinations. There is likely a small bowel ileus or possibly a  low-grade obstruction. Follow-up is recommended.  2. Air within the vaginal canal and urinary bladder may be iatrogenic,  but otherwise fistulas with bowel are possible. Please correlate  clinically.  3. New L3 compression fracture as described since the previous CT from  06/16/2020.  4. Subacute comminuted right humeral neck fracture. Multiple old and  healing right rib fractures.     This report was finalized on 1/22/2021 5:10 PM by Dr. Cindi Mercado M.D.       CT Head Without Contrast [[id number]] Collected: 01/17/21 1415     Updated: 01/18/21 0852    Narrative:      CT HEAD WITHOUT CONTRAST     HISTORY: Altered mental status, seizure.     COMPARISON: CT head 01/17/2021.     FINDINGS: The brain ventricles are symmetrical. Areas of decreased  attenuation are noted involving the white matter cerebral hemispheres  bilaterally similar in appearance as compared to the prior examination,  nonspecific. This is somewhat prominent for a patient of 50 years of  age. There is no evidence of intracranial hemorrhage, hydrocephalus or  of a focal area of decreased attenuation to  suggest acute infarction.  Further evaluation could be performed with a MRI examination of brain as  indicated.       Impression:      No evidence of intracranial hemorrhage, hydrocephalus or of  abnormal extra-axial fluid. Confluent areas of decreased attenuation are  noted involving the white matter similar as bilaterally, nonspecific.  Further evaluation could be performed with MRI examination the brain  with and without contrast.     The above information was called to and discussed with Dr. Gillespie.     Radiation dose reduction techniques were utilized, including automated  exposure control and exposure modulation based on body size.     This report was finalized on 1/18/2021 8:48 AM by Dr. Keith Vines M.D.       XR Humerus Right [[id number]] Collected: 01/17/21 0941     Updated: 01/17/21 0946    Narrative:      XR HUMERUS RIGHT-     INDICATIONS: Trauma     TECHNIQUE: 3 views of the right humerus     COMPARISON: Right shoulder x-ray from 06/16/2020     FINDINGS:     A comminuted, angulated fracture is seen at the level of the right  humeral neck with proximal retraction of the main distal fracture  fragment about 3.5 cm. A 3.9 cm osseous fragment is medially displaced.  Moderate hypertrophic degenerative change at the acromioclavicular  joint. Old right rib deformities are evident.       Impression:         Right humerus fracture.     This report was finalized on 1/17/2021 9:43 AM by Dr. Tiago Brand M.D.       CT Head Without Contrast [[id number]] Collected: 01/17/21 0928     Updated: 01/17/21 0935    Narrative:      CT HEAD WO CONTRAST-     INDICATIONS: Altered mental status     TECHNIQUE: Radiation dose reduction techniques were utilized, including  automated exposure control and exposure modulation based on body size.  Noncontrast head CT     COMPARISON: 07/06/2020     FINDINGS:     The exam is severely limited by motion artifact, despite repeat imaging.  No prior large areas of hemorrhage are noted,  but small areas of  hemorrhage could easily be obscured by motion artifact that is present.     No midline shift is noted.     Moderate periventricular hypodensities suggest chronic small vessel  ischemic change in a patient this age.           Ventricles, cisterns, cerebral sulci are unremarkable for patient age.     The paranasal sinuses appear grossly clear.                         Impression:         The exam is significantly limited as described. No hydrocephalus or  midline shift. Follow-up/further evaluation is suggested as indications  persist.     Discussed by telephone with Dr. Gillespie at 0929, 1/17/221.           This report was finalized on 1/17/2021 9:32 AM by Dr. Tiago Brand M.D.             Pertinent Labs     Results from last 7 days   Lab Units 01/25/21  0931 01/24/21  0636 01/23/21  0631 01/22/21  0659   WBC 10*3/mm3 9.89 9.18 11.26* 7.21   HEMOGLOBIN g/dL 12.1 11.7* 11.7* 10.3*   PLATELETS 10*3/mm3 568* 496* 394 237     Results from last 7 days   Lab Units 01/25/21  0931 01/24/21  0636 01/23/21  1305 01/22/21  1209   SODIUM mmol/L 136 138 137 138   POTASSIUM mmol/L 3.9 3.9 3.6 4.1   CHLORIDE mmol/L 101 105 103 107   CO2 mmol/L 22.0 24.7 20.6* 21.5*   BUN mg/dL 13 17 13 10   CREATININE mg/dL 0.40* 0.37* 0.37* 0.48*   GLUCOSE mg/dL 121* 114* 84 94   Estimated Creatinine Clearance: 140.5 mL/min (A) (by C-G formula based on SCr of 0.4 mg/dL (L)).    Results from last 7 days   Lab Units 01/25/21  0931 01/24/21  0636 01/23/21  1305 01/22/21  1209  01/20/21  0302   CALCIUM mg/dL 8.9 9.2 9.0 8.9   < > 8.3*   MAGNESIUM mg/dL  --   --   --   --   --  1.8    < > = values in this interval not displayed.     Results from last 7 days   Lab Units 01/18/21  1417   AMMONIA umol/L 34             Invalid input(s): LDLCALC        Test Results Pending at Discharge     Pending Labs     Order Current Status    Lupus Anticoag / Cardiolipin Ab In process          Discharge Details        Discharge Medications         New Medications      Instructions Start Date   OLANZapine 10 MG injection  Commonly known as: zyPREXA   5 mg, Intramuscular, Every 8 Hours PRN         Changes to Medications      Instructions Start Date   amitriptyline 150 MG tablet  Commonly known as: ELAVIL  What changed: how much to take   150 mg, Oral, Nightly         Continue These Medications      Instructions Start Date   albuterol sulfate  (90 Base) MCG/ACT inhaler  Commonly known as: PROVENTIL HFA;VENTOLIN HFA;PROAIR HFA   1-2 puffs, Inhalation, Every 4 Hours PRN      DULoxetine 30 MG capsule  Commonly known as: CYMBALTA   30 mg, Oral, Daily      levETIRAcetam 1000 MG tablet  Commonly known as: KEPPRA   1,000 mg, Oral, 2 Times Daily      nicotine 14 MG/24HR patch  Commonly known as: NICODERM CQ   1 patch, Transdermal, Every 24 Hours Scheduled, Don't use if you continue to smoke      zonisamide 100 MG capsule  Commonly known as: ZONEGRAN   200 mg, Oral, 2 times daily             No Known Allergies      Discharge Disposition:  Skilled Nursing Facility (DC - External)    Discharge Diet:  Diet Order   Procedures   • Diet Dysphagia; IV - Mechanical Soft No Mixed Consistencies; Thin       Discharge Activity:   Activity Instructions     Activity as Tolerated            CODE STATUS:    Code Status and Medical Interventions:   Ordered at: 01/17/21 1344     Code Status:    CPR     Medical Interventions (Level of Support Prior to Arrest):    Full       Future Appointments   Date Time Provider Department Center   1/25/2021  3:00 PM EMS MED 1  [facility]     Additional Instructions for the Follow-ups that You Need to Schedule     Discharge Follow-up with PCP   As directed       Currently Documented PCP:    Murphy Carty MD    PCP Phone Number:    [phone]     Follow Up Details: 1-2 weeks            Contact information for follow-up providers     Murphy Carty MD .    Specialty: Nurse Practitioner  Why: 1-2 weeks  Contact information:  [id number]  [facility] [address]  [phone]                   Contact information for after-discharge care     Destination     [facility] .    Service: Skilled Nursing  Contact information:  [address]  [facility] [address] [phone]                             Additional Instructions for the Follow-ups that You Need to Schedule     Discharge Follow-up with PCP   As directed       Currently Documented PCP:    Murphy Carty MD    PCP Phone Number:    [phone]     Follow Up Details: 1-2 weeks           Time Spent on Discharge:  Greater than 30 minutes      KACIE Nuñez  [facility]  01/25/21  12:18 EST

## 2021-01-25 NOTE — PROGRESS NOTES
Continued Stay Note  [facility]     Patient Name: Mary Chavez  MRN: [id number]  Today's Date: 1/25/2021    Admit Date: 1/17/2021    Discharge Plan     Row Name 01/25/21 0945       Plan    Plan  SNF - referrals pending    Plan Comments  Pt close to ready for DC.  Pt still disoriented at this time.  CCP spoke w/ her spouse Maxx by phone ([phone]) who is in agreement with SNF.  He prefers a facility close to the hospital, but otherwise has no preference.  Referrals to [facility], [facility], [facility], [facility] and [facility].   Evals pending. .......sk        Discharge Codes    No documentation.             Areli Rodney RN
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
## 2021-01-25 NOTE — PROGRESS NOTES
Case Management Discharge Note      Final Note: DC to skilled bed at [facility]. ...........sk         Selected Continued Care - Discharged on 1/25/2021 Admission date: 1/17/2021 - Discharge disposition: Skilled Nursing Facility (DC - External)    Destination Coordination complete    Service Provider Selected Services Address Phone Fax Patient Preferred    [facility]  Skilled Nursing [address] [address] [phone] --          Durable Medical Equipment    No services have been selected for the patient.              Dialysis/Infusion    No services have been selected for the patient.              Home Medical Care    No services have been selected for the patient.              Therapy    No services have been selected for the patient.              Community Resources    No services have been selected for the patient.                  Transportation Services  Ambulance: [facility]    Final Discharge Disposition Code: 03 - skilled nursing facility (SNF)([facility]

## 2021-01-25 NOTE — PLAN OF CARE
"  Problem: Adult Inpatient Plan of Care  Goal: Plan of Care Review  Outcome: Ongoing, Progressing  Flowsheets (Taken 1/25/2021 0448)  Plan of Care Reviewed With: patient  Outcome Summary: Pt A/O to self, VSS, stated that there were bats in the corner of her room, wanted to know if the dogs have been feed, and if there was enough food in the house for them. Attempted to reorientate her however I was unsucessful and she started asking me more questions about the dogs and if they were still alive. She was under the impression that staff came to work just to take care of her.  Pt had a BM this morning and after changing her brief she said that she felt so clean and stated\" you should smell it\".  I redirected her and told her that the comment wasn't appropriate and she laughed. She also believes that Maxx has been with her this shift, when told that she's still at [facility] she just looked at me. Tried to encourage her to sleep, however she said she needed to what TV. Will continue to monitor.   Goal Outcome Evaluation:  Plan of Care Reviewed With: patient  Progress: no change  Outcome Summary: Pt A/O to self, VSS, stated that there were bats in the corner of her room, wanted to know if the dogs have been feed, and if there was enough food in the house for them. Attempted to reorientate her however I was unsucessful and she started asking me more questions about the dogs and if they were still alive. She was under the impression that staff came to work just to take care of her.  Pt had a BM this morning and after changing her brief she said that she felt so clean and stated\" you should smell it\".  I redirected her and told her that the comment wasn't appropriate and she laughed. She also believes that Maxx has been with her this shift, when told that she's still at [facility] she just looked at me. Tried to encourage her to sleep, however she said she needed to what TV. Will continue to monitor.  "

## 2021-01-25 NOTE — CONSULTS
The patient remains generally pleasant in her interactions with disposition and is no longer in the [facility] restraint.  She is hopeful for discharge later this week to a rehabilitation facility.

## 2021-01-25 NOTE — PROGRESS NOTES
Continued Stay Note  [facility]     Patient Name: Mary Chavez  MRN: [id number]  Today's Date: 1/25/2021    Admit Date: 1/17/2021    Discharge Plan     Row Name 01/25/21 1257       Plan    Plan Comments  S/W [facility], pt is approved at [facility] and bed is available.  CCP spoke w/ pt's spouse Maxx ([phone] who is in agreement with DC to [facility] today.  DC summary and DC packet given to RN.  [facility] arranged for today at 1500.......sk    Final Note  DC to skilled bed at [facility]. ...........sk        Discharge Codes    No documentation.       Expected Discharge Date and Time     Expected Discharge Date Expected Discharge Time    Jan 25, 2021             Areli Rodney RN

## 2021-01-25 NOTE — DISCHARGE PLACEMENT REQUEST
"ScottMary (50 y.o. Female)     Date of Birth Social Security Number Address Home Phone MRN    1970  [id number] [address] [phone] [id number]    Hinduism Marital Status          Seventh Day Jain        Admission Date Admission Type Admitting Provider Attending Provider Department, Room/Bed    1/17/21 Emergency [facility] Ortega MD Shah, Aakash, MD [address], [address]    Discharge Date Discharge Disposition Discharge Destination                       Attending Provider: Rohit Pena MD    Allergies: No Known Allergies    Isolation: None   Infection: MRSA/History Only (01/17/21)   Code Status: CPR    Ht: 167.6 cm (65.98\")   Wt: 53.5 kg (117 lb 15.1 oz)    Admission Cmt: None   Principal Problem: None                Active Insurance as of 1/17/2021     Primary Coverage     Payor Plan Insurance Group Employer/Plan Group    [facility] BY SHIVAM [id number]     Payor Plan Address Payor Plan Phone Number Payor Plan Fax Number Effective Dates    [address]   1/1/2021 - None Entered    [facility] [address]       Subscriber Name Subscriber Birth Date Member ID       MARY HARDIN IDALIA 1970 [id number]                 Emergency Contacts      (Rel.) Home Phone Work Phone Mobile Phone    ScottMaxx (Spouse) [phone] -- --              "

## 2021-01-26 NOTE — PAYOR COMM NOTE
"Mary Hardin (50 y.o. Female)     PLEASE SEE ATTACHED DC SUMMARY    THANK YOU    LYN CUEVAS, CLARITA [facility]        Date of Birth Social Security Number Address Home Phone MRN    1970  [address] [phone] [id number]    Protestant Marital Status          Sev[employer]        Admission Date Admission Type Admitting Provider Attending Provider Department, Room/Bed    1/17/21 Emergency Mason Ortega MD  [address], S521/1    Discharge Date Discharge Disposition Discharge Destination        1/25/2021 Skilled Nursing Facility (DC - External)              Attending Provider: (none)   Allergies: No Known Allergies    Isolation: None   Infection: MRSA/History Only (01/17/21)   Code Status: Prior    Ht: 167.6 cm (65.98\")   Wt: 53.5 kg (117 lb 15.1 oz)    Admission Cmt: None   Principal Problem: None                Active Insurance as of 1/17/2021     Primary Coverage     Payor Plan Insurance Group Employer/Plan Group    [facility] [id number]     Payor Plan Address Payor Plan Phone Number Payor Plan Fax Number Effective Dates    [address]   1/1/2021 - None Entered    [place] [address]       Subscriber Name Subscriber Birth Date Member ID       MARY HARDIN 1970 [id number]                 Emergency Contacts      (Rel.) Home Phone Work Phone Mobile Phone    Maxx Hardin (Spouse) [phone] -- --               Discharge Summary      Amarilis Melo, APRN at 01/25/21 1217     Attestation signed by Rohit Pena MD at 01/25/21 1406    I saw the patient today and agree with the plans as described below.  Her Exam is as follows:  General: she is alert, cooperative, answering questions appropriately.    Heart: Regular rate and rhythm, no acute results  Lungs: Clear to oscillation bilaterally  Psych: Mood and affect appropriate  Neuro: Cranial nerves grossly intact, no gross " neurological deficits appreciated.                      Patient Name: Mary Chavez  : 1970  MRN: [id number]    Date of Admission: 2021  Date of Discharge:  2021  Primary Care Physician: Murphy Carty MD      Chief Complaint:   Seizures      Discharge Diagnoses     Active Hospital Problems    Diagnosis  POA   • Severe malnutrition (CMS/HCC) [E43]  Yes   • B12 deficiency [E53.8]  Yes   • Polysubstance abuse (CMS/HCC) [F19.10]  Yes   • Opioid use disorder (CMS/HCC) [F11.99]  Yes   • Acute psychosis (CMS/HCC) [F23]  Yes   • Acute metabolic encephalopathy [G93.41]  Yes   • Fracture of right humerus [S42.301A]  Yes   • Hypokalemia [E87.6]  Yes   • Chronic pain syndrome [G89.4]  Yes   • Seizures (CMS/HCC) [R56.9]  Yes      Resolved Hospital Problems   No resolved problems to display.        Hospital Course     Ms. Chavez is a 50 y.o. female with a history of seizures, medication noncompliance, polysubstance abuse, anemia and anxiety who [facility] with altered mental status after missing a dose of keppra and taking illegal drugs.  Please see the admitting history and physical for further details.  She was found to have metabolic encephalopathy and epileptic versus nonepileptic seizures. She was admitted to the hospital for further evaluation and treatment. She was evaluated by neurology who ordered extensive workup that remained largely unremarkable. Encephalopathy was not thought to be secondary to opiate withdrawal. She was placed on IV Keppra in which was later transitioned to PO. Metabolic encephalopathy and severe white matter changes found on MRI likely related to long term polysubstance abuse. She required restraints for some time during hospitalization for safety. She underwent autoimmune workup in which was also negative. An EEG was unable to be preformed as she could not tolerate x2 and given improvement was not re-ordered. She slowly made improvement and is  currently back to baseline mentation. Psychiatry evaluated the patient and added zyprexa and ativan with decent response. Vitamin b12 on low end and she was given IM replacement. Apparently the patient recently broke her right humerus and was seen by an orthopedic surgeon who suggests conservative management with sling, ice and pain control. PT evaluated and recommends skilled PT at the time of discharge. She will be sent to [facility] today in stable condition and should follow up with her PCP in 1-2 weeks.      Day of Discharge     Subjective:  no new complaints or events overnight. she is more than ready to go.     Review of Systems   Constitutional: Negative for chills and fever.   HENT: Positive for dental problem. Negative for congestion and sore throat.    Eyes: Negative for discharge and itching.   Respiratory: Negative for cough and shortness of breath.    Cardiovascular: Negative for chest pain and palpitations.   Gastrointestinal: Negative for abdominal pain, diarrhea, nausea and vomiting.   Endocrine: Negative for cold intolerance and heat intolerance.   Genitourinary: Negative for difficulty urinating and dysuria.   Musculoskeletal: Negative for back pain and gait problem.   Skin: Negative for color change and pallor.   Allergic/Immunologic: Negative for environmental allergies and immunocompromised state.   Neurological: Negative for dizziness and headaches.   Hematological: Negative for adenopathy. Does not bruise/bleed easily.   Psychiatric/Behavioral: Negative for agitation and behavioral problems.       Physical Exam:  Temp:  [97.4 °F (36.3 °C)-98.4 °F (36.9 °C)] 98 °F (36.7 °C)  Heart Rate:  [] 94  Resp:  [16-18] 18  BP: (127-141)/(81-92) 141/92  Body mass index is 19.05 kg/m².  Physical Exam  Vitals signs and nursing note reviewed.   Constitutional:       Appearance: She is ill-appearing (chronically).      Comments: appears older than stated age.    HENT:      Head: Normocephalic and  atraumatic.   Eyes:      Conjunctiva/sclera: Conjunctivae normal.   Neck:      Musculoskeletal: Normal range of motion and neck supple.   Cardiovascular:      Rate and Rhythm: Normal rate and regular rhythm.   Pulmonary:      Effort: Pulmonary effort is normal. No respiratory distress.      Breath sounds: Normal breath sounds.   Abdominal:      General: Bowel sounds are normal. There is no distension.      Palpations: Abdomen is soft.      Tenderness: There is no abdominal tenderness.   Musculoskeletal: Normal range of motion.         General: Tenderness present. No swelling.   Skin:     General: Skin is warm and dry.   Neurological:      Mental Status: She is alert and oriented to person, place, and time. Mental status is at baseline.   Psychiatric:         Mood and Affect: Mood normal.         Behavior: Behavior normal.         Consultants     Consult Orders (all) (From admission, onward)     Start     Ordered    01/17/21 1448  Inpatient Orthopedic Surgery Consult  Once     Specialty:  Orthopedic Surgery  Provider:  Carlos Manuel Butt MD    01/17/21 1448    01/17/21 1343  Inpatient Neurology Consult General  Once     Specialty:  Neurology  Provider:  Chad Lance MD    01/17/21 1343    01/17/21 1343  Inpatient Psychiatrist Consult  Once     Specialty:  Psychiatry  Provider:  Galen Forbes III, MD    01/17/21 1343              Procedures     Imaging Results (All)     Procedure Component Value Units Date/Time    CT CHEST WITHOUT CONTRAST DIAGNOSTIC [[id number]] Collected: 01/22/21 1531     Updated: 01/22/21 1713    Narrative:      CT CHEST, ABDOMEN, AND PELVIS WITHOUT CONTRAST.     HISTORY: 50-year-old female with unintentional weight loss. Gastric  bypass and hysterectomy in the past.     TECHNIQUE: Radiation dose reduction techniques were utilized, including  automated exposure control and exposure modulation based on body size.   3 mm images were obtained through the chest, abdomen, and  pelvis without  the administration of IV contrast. Compared with CTs from 06/16/2020     FINDINGS:  CHEST: There are no suspicious pulmonary opacities or nodules. There are  no pleural or pericardial effusions. There is no lymphadenopathy within  the chest. A subacute comminuted right humeral neck fracture with  surrounding edema is noted. There are multiple old as well as healing  right rib fractures.     ABDOMEN/PELVIS: There is streak artifact from the patient's arms. The  patient was unable to raise the arms overhead. Noncontrasted liver and  gallbladder appear unremarkable. Noncontrasted spleen, pancreas,  adrenals, and kidneys appear unremarkable. There is significant air  distention of the segment of bowel which has the primary small bowel  anastomosis. The segment has been distended in the past, but to a  smaller degree. The colon is essentially devoid of formed stool and is  nearly completely collapsed. No definitive bowel thickening is seen.  There is a small amount of air within the vaginal canal and there is  also a small amount of air within the nondependent aspect of the urinary  bladder. There are mild abdominal aortic atherosclerotic changes without  aneurysmal dilatation. There is significant diffuse osseous  demineralization. Since the previous CT, there has been development of  an L3 compression fracture with complete loss of height at the mid  aspect of the vertebral body with approximately 8 mm retropulsion into  the spinal canal. (Fracture at L5 and kyphoplasty changes at L2, T12,  and T11 appear unchanged.       Impression:      1. The colon is nearly completely collapsed and devoid of formed stool,  but there is no evidence for colitis. The segment of small bowel which  has the primary small bowel anastomosis related to the gastric bypass in  the past is significantly dilated, more so than on the previous  examinations. There is likely a small bowel ileus or possibly a  low-grade obstruction.  Follow-up is recommended.  2. Air within the vaginal canal and urinary bladder may be iatrogenic,  but otherwise fistulas with bowel are possible. Please correlate  clinically.  3. New L3 compression fracture as described since the previous CT from  06/16/2020.  4. Subacute comminuted right humeral neck fracture. Multiple old and  healing right rib fractures.     This report was finalized on 1/22/2021 5:10 PM by Dr. Cindi Mercado M.D.       CT Abdomen Pelvis Without Contrast [[id number]] Collected: 01/22/21 1531     Updated: 01/22/21 1713    Narrative:      CT CHEST, ABDOMEN, AND PELVIS WITHOUT CONTRAST.     HISTORY: 50-year-old female with unintentional weight loss. Gastric  bypass and hysterectomy in the past.     TECHNIQUE: Radiation dose reduction techniques were utilized, including  automated exposure control and exposure modulation based on body size.   3 mm images were obtained through the chest, abdomen, and pelvis without  the administration of IV contrast. Compared with CTs from 06/16/2020     FINDINGS:  CHEST: There are no suspicious pulmonary opacities or nodules. There are  no pleural or pericardial effusions. There is no lymphadenopathy within  the chest. A subacute comminuted right humeral neck fracture with  surrounding edema is noted. There are multiple old as well as healing  right rib fractures.     ABDOMEN/PELVIS: There is streak artifact from the patient's arms. The  patient was unable to raise the arms overhead. Noncontrasted liver and  gallbladder appear unremarkable. Noncontrasted spleen, pancreas,  adrenals, and kidneys appear unremarkable. There is significant air  distention of the segment of bowel which has the primary small bowel  anastomosis. The segment has been distended in the past, but to a  smaller degree. The colon is essentially devoid of formed stool and is  nearly completely collapsed. No definitive bowel thickening is seen.  There is a small amount of air within the vaginal canal  and there is  also a small amount of air within the nondependent aspect of the urinary  bladder. There are mild abdominal aortic atherosclerotic changes without  aneurysmal dilatation. There is significant diffuse osseous  demineralization. Since the previous CT, there has been development of  an L3 compression fracture with complete loss of height at the mid  aspect of the vertebral body with approximately 8 mm retropulsion into  the spinal canal. (Fracture at L5 and kyphoplasty changes at L2, T12,  and T11 appear unchanged.       Impression:      1. The colon is nearly completely collapsed and devoid of formed stool,  but there is no evidence for colitis. The segment of small bowel which  has the primary small bowel anastomosis related to the gastric bypass in  the past is significantly dilated, more so than on the previous  examinations. There is likely a small bowel ileus or possibly a  low-grade obstruction. Follow-up is recommended.  2. Air within the vaginal canal and urinary bladder may be iatrogenic,  but otherwise fistulas with bowel are possible. Please correlate  clinically.  3. New L3 compression fracture as described since the previous CT from  06/16/2020.  4. Subacute comminuted right humeral neck fracture. Multiple old and  healing right rib fractures.     This report was finalized on 1/22/2021 5:10 PM by Dr. Cindi Mercado M.D.       CT Head Without Contrast [[id number]] Collected: 01/17/21 1415     Updated: 01/18/21 0852    Narrative:      CT HEAD WITHOUT CONTRAST     HISTORY: Altered mental status, seizure.     COMPARISON: CT head 01/17/2021.     FINDINGS: The brain ventricles are symmetrical. Areas of decreased  attenuation are noted involving the white matter cerebral hemispheres  bilaterally similar in appearance as compared to the prior examination,  nonspecific. This is somewhat prominent for a patient of 50 years of  age. There is no evidence of intracranial hemorrhage, hydrocephalus or  of a  focal area of decreased attenuation to suggest acute infarction.  Further evaluation could be performed with a MRI examination of brain as  indicated.       Impression:      No evidence of intracranial hemorrhage, hydrocephalus or of  abnormal extra-axial fluid. Confluent areas of decreased attenuation are  noted involving the white matter similar as bilaterally, nonspecific.  Further evaluation could be performed with MRI examination the brain  with and without contrast.     The above information was called to and discussed with Dr. Gillespie.     Radiation dose reduction techniques were utilized, including automated  exposure control and exposure modulation based on body size.     This report was finalized on 1/18/2021 8:48 AM by Dr. Keith Vines M.D.       XR Humerus Right [[id number]] Collected: 01/17/21 0941     Updated: 01/17/21 0946    Narrative:      XR HUMERUS RIGHT-     INDICATIONS: Trauma     TECHNIQUE: 3 views of the right humerus     COMPARISON: Right shoulder x-ray from 06/16/2020     FINDINGS:     A comminuted, angulated fracture is seen at the level of the right  humeral neck with proximal retraction of the main distal fracture  fragment about 3.5 cm. A 3.9 cm osseous fragment is medially displaced.  Moderate hypertrophic degenerative change at the acromioclavicular  joint. Old right rib deformities are evident.       Impression:         Right humerus fracture.     This report was finalized on 1/17/2021 9:43 AM by Dr. Tiago Brand M.D.       CT Head Without Contrast [[id number]] Collected: 01/17/21 0928     Updated: 01/17/21 0935    Narrative:      CT HEAD WO CONTRAST-     INDICATIONS: Altered mental status     TECHNIQUE: Radiation dose reduction techniques were utilized, including  automated exposure control and exposure modulation based on body size.  Noncontrast head CT     COMPARISON: 07/06/2020     FINDINGS:     The exam is severely limited by motion artifact, despite repeat imaging.  No  prior large areas of hemorrhage are noted, but small areas of  hemorrhage could easily be obscured by motion artifact that is present.     No midline shift is noted.     Moderate periventricular hypodensities suggest chronic small vessel  ischemic change in a patient this age.           Ventricles, cisterns, cerebral sulci are unremarkable for patient age.     The paranasal sinuses appear grossly clear.                         Impression:         The exam is significantly limited as described. No hydrocephalus or  midline shift. Follow-up/further evaluation is suggested as indications  persist.     Discussed by telephone with Dr. Gillespie at 0929, 1/17/221.           This report was finalized on 1/17/2021 9:32 AM by Dr. Tiago Brand M.D.             Pertinent Labs     Results from last 7 days   Lab Units 01/25/21  0931 01/24/21  0636 01/23/21  0631 01/22/21  0659   WBC 10*3/mm3 9.89 9.18 11.26* 7.21   HEMOGLOBIN g/dL 12.1 11.7* 11.7* 10.3*   PLATELETS 10*3/mm3 568* 496* 394 237     Results from last 7 days   Lab Units 01/25/21  0931 01/24/21  0636 01/23/21  1305 01/22/21  1209   SODIUM mmol/L 136 138 137 138   POTASSIUM mmol/L 3.9 3.9 3.6 4.1   CHLORIDE mmol/L 101 105 103 107   CO2 mmol/L 22.0 24.7 20.6* 21.5*   BUN mg/dL 13 17 13 10   CREATININE mg/dL 0.40* 0.37* 0.37* 0.48*   GLUCOSE mg/dL 121* 114* 84 94   Estimated Creatinine Clearance: 140.5 mL/min (A) (by C-G formula based on SCr of 0.4 mg/dL (L)).    Results from last 7 days   Lab Units 01/25/21  0931 01/24/21  0636 01/23/21  1305 01/22/21  1209  01/20/21  0302   CALCIUM mg/dL 8.9 9.2 9.0 8.9   < > 8.3*   MAGNESIUM mg/dL  --   --   --   --   --  1.8    < > = values in this interval not displayed.     Results from last 7 days   Lab Units 01/18/21  1417   AMMONIA umol/L 34             Invalid input(s): LDLCALC        Test Results Pending at Discharge     Pending Labs     Order Current Status    Lupus Anticoag / Cardiolipin Ab In process          Discharge  Details        Discharge Medications      New Medications      Instructions Start Date   OLANZapine 10 MG injection  Commonly known as: zyPREXA   5 mg, Intramuscular, Every 8 Hours PRN         Changes to Medications      Instructions Start Date   amitriptyline 150 MG tablet  Commonly known as: ELAVIL  What changed: how much to take   150 mg, Oral, Nightly         Continue These Medications      Instructions Start Date   albuterol sulfate  (90 Base) MCG/ACT inhaler  Commonly known as: PROVENTIL HFA;VENTOLIN HFA;PROAIR HFA   1-2 puffs, Inhalation, Every 4 Hours PRN      DULoxetine 30 MG capsule  Commonly known as: CYMBALTA   30 mg, Oral, Daily      levETIRAcetam 1000 MG tablet  Commonly known as: KEPPRA   1,000 mg, Oral, 2 Times Daily      nicotine 14 MG/24HR patch  Commonly known as: NICODERM CQ   1 patch, Transdermal, Every 24 Hours Scheduled, Don't use if you continue to smoke      zonisamide 100 MG capsule  Commonly known as: ZONEGRAN   200 mg, Oral, 2 times daily             No Known Allergies      Discharge Disposition:  Skilled Nursing Facility (DC - External)    Discharge Diet:  Diet Order   Procedures   • Diet Dysphagia; IV - Mechanical Soft No Mixed Consistencies; Thin       Discharge Activity:   Activity Instructions     Activity as Tolerated            CODE STATUS:    Code Status and Medical Interventions:   Ordered at: 01/17/21 1344     Code Status:    CPR     Medical Interventions (Level of Support Prior to Arrest):    Full       Future Appointments   Date Time Provider Department Center   1/25/2021  3:00 PM EMS MED [facility]  [facility]     Additional Instructions for the Follow-ups that You Need to Schedule     Discharge Follow-up with PCP   As directed       Currently Documented PCP:    Murphy Carty MD    PCP Phone Number:    [phone]     Follow Up Details: 1-2 weeks            Contact information for follow-up providers     Murphy Carty MD .    Specialty: Nurse Practitioner  Why:  1-2 weeks  Contact information:  [address] [place] [address]  [place] [address]  [phone]                   Contact information for after-discharge care     Destination     [facility] .    Service: Skilled Nursing  Contact information:  [address]  [facility] [address] [phone]                             Additional Instructions for the Follow-ups that You Need to Schedule     Discharge Follow-up with PCP   As directed       Currently Documented PCP:    Murphy Carty MD    PCP Phone Number:    [phone]     Follow Up Details: 1-2 weeks           Time Spent on Discharge:  Greater than 30 minutes      KACIE Nuñez  [facility]  01/25/21  12:18 EST                Electronically signed by Rohit Pena MD at 01/25/21 2369

## 2021-02-05 LAB
APTT HEX PL PPP: 2 SEC
APTT IMM NP PPP: NORMAL S
APTT PPP 1:1 SALINE: NORMAL S
APTT PPP: 23.5 SEC
B2 GLYCOPROT1 IGA SER-ACNC: <10 SAU
B2 GLYCOPROT1 IGG SER-ACNC: <10 SGU
B2 GLYCOPROT1 IGM SER-ACNC: <10 SMU
CARDIOLIPIN IGG SER IA-ACNC: <10 GPL
CARDIOLIPIN IGM SER IA-ACNC: <10 MPL
CONFIRM DRVVT: NORMAL S
DRVVT SCREEN TO CONFIRM RATIO: NORMAL {RATIO}
INR PPP: 0.9 RATIO
LABORATORY COMMENT REPORT: NORMAL
PROTHROMBIN TIME: 10.1 SEC
SCREEN DRVVT: 36.7 SEC
THROMBIN TIME: 14.3 SEC

## 2021-04-11 ENCOUNTER — APPOINTMENT (OUTPATIENT)
Dept: GENERAL RADIOLOGY | Facility: HOSPITAL | Age: 51
End: 2021-04-11

## 2021-04-11 ENCOUNTER — HOSPITAL ENCOUNTER (INPATIENT)
Facility: HOSPITAL | Age: 51
LOS: 4 days | Discharge: NURSING, REHAB OR LONG-TERM CARE | End: 2021-04-15
Attending: EMERGENCY MEDICINE | Admitting: HOSPITALIST

## 2021-04-11 DIAGNOSIS — Z59.00 HOMELESSNESS: ICD-10-CM

## 2021-04-11 DIAGNOSIS — E46 MALNUTRITION, UNSPECIFIED TYPE (HCC): ICD-10-CM

## 2021-04-11 DIAGNOSIS — F19.10 POLYSUBSTANCE ABUSE (HCC): ICD-10-CM

## 2021-04-11 DIAGNOSIS — L89.300 PRESSURE INJURY OF BUTTOCK, UNSTAGEABLE, UNSPECIFIED LATERALITY (HCC): Primary | ICD-10-CM

## 2021-04-11 DIAGNOSIS — R64 CACHEXIA (HCC): ICD-10-CM

## 2021-04-11 PROBLEM — E44.0 MODERATE MALNUTRITION (HCC): Status: ACTIVE | Noted: 2021-04-11

## 2021-04-11 LAB
ALBUMIN SERPL-MCNC: 2.5 G/DL (ref 3.5–5.2)
ALBUMIN/GLOB SERPL: 0.8 G/DL
ALP SERPL-CCNC: 193 U/L (ref 39–117)
ALT SERPL W P-5'-P-CCNC: 12 U/L (ref 1–33)
AMMONIA BLD-SCNC: 27 UMOL/L (ref 11–51)
ANION GAP SERPL CALCULATED.3IONS-SCNC: 7.4 MMOL/L (ref 5–15)
AST SERPL-CCNC: 15 U/L (ref 1–32)
BASOPHILS # BLD AUTO: 0.03 10*3/MM3 (ref 0–0.2)
BASOPHILS NFR BLD AUTO: 0.3 % (ref 0–1.5)
BILIRUB SERPL-MCNC: 0.4 MG/DL (ref 0–1.2)
BUN SERPL-MCNC: 12 MG/DL (ref 6–20)
BUN/CREAT SERPL: ABNORMAL
CALCIUM SPEC-SCNC: 8.4 MG/DL (ref 8.6–10.5)
CHLORIDE SERPL-SCNC: 94 MMOL/L (ref 98–107)
CO2 SERPL-SCNC: 29.6 MMOL/L (ref 22–29)
CREAT SERPL-MCNC: <0.47 MG/DL (ref 0.57–1)
D-LACTATE SERPL-SCNC: 1.5 MMOL/L (ref 0.5–2)
DEPRECATED RDW RBC AUTO: 40.6 FL (ref 37–54)
EOSINOPHIL # BLD AUTO: 0.01 10*3/MM3 (ref 0–0.4)
EOSINOPHIL NFR BLD AUTO: 0.1 % (ref 0.3–6.2)
ERYTHROCYTE [DISTWIDTH] IN BLOOD BY AUTOMATED COUNT: 12.3 % (ref 12.3–15.4)
ETHANOL BLD-MCNC: <10 MG/DL (ref 0–10)
ETHANOL UR QL: <0.01 %
GLOBULIN UR ELPH-MCNC: 3.1 GM/DL
GLUCOSE SERPL-MCNC: 84 MG/DL (ref 65–99)
HCT VFR BLD AUTO: 39.3 % (ref 34–46.6)
HGB BLD-MCNC: 13 G/DL (ref 12–15.9)
IMM GRANULOCYTES # BLD AUTO: 0.03 10*3/MM3 (ref 0–0.05)
IMM GRANULOCYTES NFR BLD AUTO: 0.3 % (ref 0–0.5)
INR PPP: 0.99 (ref 0.9–1.1)
LYMPHOCYTES # BLD AUTO: 1.64 10*3/MM3 (ref 0.7–3.1)
LYMPHOCYTES NFR BLD AUTO: 19.1 % (ref 19.6–45.3)
MAGNESIUM SERPL-MCNC: 1.9 MG/DL (ref 1.6–2.6)
MCH RBC QN AUTO: 29.5 PG (ref 26.6–33)
MCHC RBC AUTO-ENTMCNC: 33.1 G/DL (ref 31.5–35.7)
MCV RBC AUTO: 89.1 FL (ref 79–97)
MONOCYTES # BLD AUTO: 0.49 10*3/MM3 (ref 0.1–0.9)
MONOCYTES NFR BLD AUTO: 5.7 % (ref 5–12)
NEUTROPHILS NFR BLD AUTO: 6.4 10*3/MM3 (ref 1.7–7)
NEUTROPHILS NFR BLD AUTO: 74.5 % (ref 42.7–76)
NRBC BLD AUTO-RTO: 0 /100 WBC (ref 0–0.2)
PLATELET # BLD AUTO: 394 10*3/MM3 (ref 140–450)
PMV BLD AUTO: 8.3 FL (ref 6–12)
POTASSIUM SERPL-SCNC: 3.5 MMOL/L (ref 3.5–5.2)
PROCALCITONIN SERPL-MCNC: 0.06 NG/ML (ref 0–0.25)
PROT SERPL-MCNC: 5.6 G/DL (ref 6–8.5)
PROTHROMBIN TIME: 12.9 SECONDS (ref 11.7–14.2)
RBC # BLD AUTO: 4.41 10*6/MM3 (ref 3.77–5.28)
SARS-COV-2 ORF1AB RESP QL NAA+PROBE: NOT DETECTED
SODIUM SERPL-SCNC: 131 MMOL/L (ref 136–145)
WBC # BLD AUTO: 8.6 10*3/MM3 (ref 3.4–10.8)

## 2021-04-11 PROCEDURE — 25010000002 HYDROMORPHONE PER 4 MG: Performed by: HOSPITALIST

## 2021-04-11 PROCEDURE — 87147 CULTURE TYPE IMMUNOLOGIC: CPT | Performed by: HOSPITALIST

## 2021-04-11 PROCEDURE — 87186 SC STD MICRODIL/AGAR DIL: CPT | Performed by: HOSPITALIST

## 2021-04-11 PROCEDURE — 83605 ASSAY OF LACTIC ACID: CPT | Performed by: NURSE PRACTITIONER

## 2021-04-11 PROCEDURE — 85610 PROTHROMBIN TIME: CPT | Performed by: NURSE PRACTITIONER

## 2021-04-11 PROCEDURE — 82077 ASSAY SPEC XCP UR&BREATH IA: CPT | Performed by: NURSE PRACTITIONER

## 2021-04-11 PROCEDURE — G0378 HOSPITAL OBSERVATION PER HR: HCPCS

## 2021-04-11 PROCEDURE — 25010000002 ONDANSETRON PER 1 MG: Performed by: NURSE PRACTITIONER

## 2021-04-11 PROCEDURE — 87040 BLOOD CULTURE FOR BACTERIA: CPT | Performed by: NURSE PRACTITIONER

## 2021-04-11 PROCEDURE — 99285 EMERGENCY DEPT VISIT HI MDM: CPT

## 2021-04-11 PROCEDURE — U0004 COV-19 TEST NON-CDC HGH THRU: HCPCS | Performed by: NURSE PRACTITIONER

## 2021-04-11 PROCEDURE — 84145 PROCALCITONIN (PCT): CPT | Performed by: NURSE PRACTITIONER

## 2021-04-11 PROCEDURE — 87205 SMEAR GRAM STAIN: CPT | Performed by: HOSPITALIST

## 2021-04-11 PROCEDURE — 83735 ASSAY OF MAGNESIUM: CPT | Performed by: NURSE PRACTITIONER

## 2021-04-11 PROCEDURE — 85025 COMPLETE CBC W/AUTO DIFF WBC: CPT | Performed by: NURSE PRACTITIONER

## 2021-04-11 PROCEDURE — 82140 ASSAY OF AMMONIA: CPT | Performed by: NURSE PRACTITIONER

## 2021-04-11 PROCEDURE — 87070 CULTURE OTHR SPECIMN AEROBIC: CPT | Performed by: HOSPITALIST

## 2021-04-11 PROCEDURE — 25010000002 HYDROMORPHONE PER 4 MG: Performed by: NURSE PRACTITIONER

## 2021-04-11 PROCEDURE — 80053 COMPREHEN METABOLIC PANEL: CPT | Performed by: NURSE PRACTITIONER

## 2021-04-11 PROCEDURE — 71045 X-RAY EXAM CHEST 1 VIEW: CPT

## 2021-04-11 RX ORDER — ACETAMINOPHEN 325 MG/1
650 TABLET ORAL EVERY 4 HOURS PRN
Status: DISCONTINUED | OUTPATIENT
Start: 2021-04-11 | End: 2021-04-15 | Stop reason: HOSPADM

## 2021-04-11 RX ORDER — HYDROMORPHONE HYDROCHLORIDE 1 MG/ML
0.5 INJECTION, SOLUTION INTRAMUSCULAR; INTRAVENOUS; SUBCUTANEOUS ONCE
Status: COMPLETED | OUTPATIENT
Start: 2021-04-11 | End: 2021-04-11

## 2021-04-11 RX ORDER — SODIUM CHLORIDE 0.9 % (FLUSH) 0.9 %
10 SYRINGE (ML) INJECTION AS NEEDED
Status: DISCONTINUED | OUTPATIENT
Start: 2021-04-11 | End: 2021-04-15 | Stop reason: HOSPADM

## 2021-04-11 RX ORDER — ONDANSETRON 2 MG/ML
4 INJECTION INTRAMUSCULAR; INTRAVENOUS ONCE
Status: COMPLETED | OUTPATIENT
Start: 2021-04-11 | End: 2021-04-11

## 2021-04-11 RX ORDER — ACETAMINOPHEN 650 MG/1
650 SUPPOSITORY RECTAL EVERY 4 HOURS PRN
Status: DISCONTINUED | OUTPATIENT
Start: 2021-04-11 | End: 2021-04-15 | Stop reason: HOSPADM

## 2021-04-11 RX ORDER — HYDROMORPHONE HYDROCHLORIDE 1 MG/ML
0.5 INJECTION, SOLUTION INTRAMUSCULAR; INTRAVENOUS; SUBCUTANEOUS
Status: DISCONTINUED | OUTPATIENT
Start: 2021-04-11 | End: 2021-04-15 | Stop reason: HOSPADM

## 2021-04-11 RX ORDER — ONDANSETRON 4 MG/1
4 TABLET, FILM COATED ORAL EVERY 6 HOURS PRN
Status: DISCONTINUED | OUTPATIENT
Start: 2021-04-11 | End: 2021-04-15 | Stop reason: HOSPADM

## 2021-04-11 RX ORDER — SODIUM CHLORIDE 0.9 % (FLUSH) 0.9 %
10 SYRINGE (ML) INJECTION EVERY 12 HOURS SCHEDULED
Status: DISCONTINUED | OUTPATIENT
Start: 2021-04-11 | End: 2021-04-15 | Stop reason: HOSPADM

## 2021-04-11 RX ORDER — HYDROCODONE BITARTRATE AND ACETAMINOPHEN 5; 325 MG/1; MG/1
1 TABLET ORAL EVERY 4 HOURS PRN
Status: DISCONTINUED | OUTPATIENT
Start: 2021-04-11 | End: 2021-04-15 | Stop reason: HOSPADM

## 2021-04-11 RX ORDER — ACETAMINOPHEN 160 MG/5ML
650 SOLUTION ORAL EVERY 4 HOURS PRN
Status: DISCONTINUED | OUTPATIENT
Start: 2021-04-11 | End: 2021-04-15 | Stop reason: HOSPADM

## 2021-04-11 RX ORDER — ONDANSETRON 2 MG/ML
4 INJECTION INTRAMUSCULAR; INTRAVENOUS EVERY 6 HOURS PRN
Status: DISCONTINUED | OUTPATIENT
Start: 2021-04-11 | End: 2021-04-15 | Stop reason: HOSPADM

## 2021-04-11 RX ORDER — NALOXONE HCL 0.4 MG/ML
0.4 VIAL (ML) INJECTION
Status: DISCONTINUED | OUTPATIENT
Start: 2021-04-11 | End: 2021-04-15 | Stop reason: HOSPADM

## 2021-04-11 RX ADMIN — ONDANSETRON 4 MG: 2 INJECTION INTRAMUSCULAR; INTRAVENOUS at 14:10

## 2021-04-11 RX ADMIN — SODIUM CHLORIDE, PRESERVATIVE FREE 10 ML: 5 INJECTION INTRAVENOUS at 19:40

## 2021-04-11 RX ADMIN — HYDROMORPHONE HYDROCHLORIDE 0.5 MG: 1 INJECTION, SOLUTION INTRAMUSCULAR; INTRAVENOUS; SUBCUTANEOUS at 14:34

## 2021-04-11 RX ADMIN — HYDROMORPHONE HYDROCHLORIDE 0.5 MG: 1 INJECTION, SOLUTION INTRAMUSCULAR; INTRAVENOUS; SUBCUTANEOUS at 18:24

## 2021-04-11 RX ADMIN — HYDROMORPHONE HYDROCHLORIDE 0.5 MG: 1 INJECTION, SOLUTION INTRAMUSCULAR; INTRAVENOUS; SUBCUTANEOUS at 14:10

## 2021-04-11 RX ADMIN — HYDROCODONE BITARTRATE AND ACETAMINOPHEN 1 TABLET: 5; 325 TABLET ORAL at 19:39

## 2021-04-11 RX ADMIN — SODIUM CHLORIDE 1650 ML: 9 INJECTION, SOLUTION INTRAVENOUS at 14:12

## 2021-04-11 SDOH — ECONOMIC STABILITY - HOUSING INSECURITY: HOMELESSNESS UNSPECIFIED: Z59.00

## 2021-04-11 NOTE — ED PROVIDER NOTES
13:09 EDT  Patient seen and examined with nurse practitioner.  Briefly patient presents for evaluation of multiple complaints.  Patient having diffuse body aches.  Patient has large decubitus ulcer.  Patient is homeless and has had significant weight loss.  Denies chest pain or abdominal pain.  Patient was in skilled nursing.        On exam patient is cachectic and ill-appearing.  Patient's heart is regular rate and rhythm and lungs are clear bilaterally.  Patient's abdomen is soft.  Patient does have a large decubitus ulcer            Plan will be lab evaluation.  Has been seen by CCP nurse.        MD ATTESTATION NOTE    The SHERITA and I have discussed this patient's history, physical exam, and treatment plan.  I have reviewed the documentation and personally had a face to face interaction with the patient. I affirm the documentation and agree with the treatment and plan.  The attached note describes my personal findings.      Patient was wearing a face mask when I entered the room and they continued to wear a mask throughout their stay in the ED.  I wore PPE, including  gloves, face mask with shield or face mask with goggles whenever I was in the room with patient.      Mayco Smith MD  04/11/21 4933

## 2021-04-11 NOTE — ED NOTES
RN requested camera for wound photo documentation. Camera unable to be located on unit per Charge ALEXANDER Fan.      Jammie August RN  04/11/21 9683

## 2021-04-11 NOTE — ED PROVIDER NOTES
"EMERGENCY DEPARTMENT ENCOUNTER    Room Number:  P681/1  Date seen:  4/11/2021  Time seen: 12:13 EDT  PCP: Murphy Carty MD  Historian: Patient, recent records    HPI:  Chief complaint: I am in hell\"  A complete HPI/ROS/PMH/PSH/SH/FH are unobtainable due to: Not applicable  Context:Mary Chavez is a 50 y.o. female who presents to the ED with c/o \"I am in hell\" due to bedsores, addiction, and homelessness.  She has severe buttocks pain due to some sores that of been present for at least 6 weeks.  She reports that she was in a skilled nursing home for rehab but thinks she has been out for about 6 weeks and that she and her  are currently homeless.  She is vague on her smoking history but her last cigarette was this morning.  She does endorse using marijuana and heroin or whenever she can get her hands on to help with the pain.  The pain is made worse by any type of movement and sitting for long periods of time.  She denies fever, chills or urinary symptoms.  She has no chest pain or shortness of breath.    Patient was placed in face mask in first look. Patient was wearing facemask when I entered the room and throughout our encounter. I wore full protective equipment throughout this patient encounter including a face mask, eye shield and gloves. Hand hygiene/washing of hands was performed before donning protective equipment and after removal when leaving the room.    MEDICAL RECORD REVIEW  From discharge summary 1/25/2021:  Ms. Chavez is a 50 y.o. female with a history of seizures, medication noncompliance, polysubstance abuse, anemia and anxiety who presented to [facility] with altered mental status after missing a dose of keppra and taking illegal drugs.  Please see the admitting history and physical for further details.  She was found to have metabolic encephalopathy and epileptic versus nonepileptic seizures. She was admitted to the hospital for further evaluation and treatment. She was " evaluated by neurology who ordered extensive workup that remained largely unremarkable. Encephalopathy was not thought to be secondary to opiate withdrawal. She was placed on IV Keppra in which was later transitioned to PO. Metabolic encephalopathy and severe white matter changes found on MRI likely related to long term polysubstance abuse. She required restraints for some time during hospitalization for safety. She underwent autoimmune workup in which was also negative. An EEG was unable to be preformed as she could not tolerate x2 and given improvement was not re-ordered. She slowly made improvement and is currently back to baseline mentation. Psychiatry evaluated the patient and added zyprexa and ativan with decent response. Vitamin b12 on low end and she was given IM replacement. Apparently the patient recently broke her right humerus and was seen by an orthopedic surgeon who suggests conservative management with sling, ice and pain control. PT evaluated and recommends skilled PT at the time of discharge. She will be sent to [facility] today in stable condition and should follow up with her PCP in 1-2 weeks.         ALLERGIES  Patient has no known allergies.    PAST MEDICAL HISTORY  Active Ambulatory Problems     Diagnosis Date Noted   • Seizures (CMS/[facility]) 07/02/2020   • Opioid withdrawal delirium (CMS/[facility]) 07/05/2020   • Chronic pain syndrome 07/10/2020   • Third degree burn of right lower extremity 07/24/2020   • Hyperglycemia 07/24/2020   • Leukocytosis 07/24/2020   • Hypoalbuminemia 07/24/2020   • Anemia, chronic disease 07/24/2020   • Hyponatremia 07/24/2020   • Tobacco abuse 07/24/2020   • MRSA infection 07/27/2020   • LILIAN (iron deficiency anemia) 07/27/2020   • Anxiety disorder 07/27/2020   • Hypokalemia 07/28/2020   • Metabolic encephalopathy 07/29/2020   • Polysubstance abuse (CMS/[facility]) 01/17/2021   • Opioid use disorder (CMS/[facility]) 01/17/2021   • Acute psychosis (CMS/[facility]) 01/17/2021   • Acute metabolic  "encephalopathy 01/17/2021   • Fracture of right humerus 01/17/2021   • B12 deficiency 01/20/2021   • Severe malnutrition (CMS/HCC) 01/21/2021     Resolved Ambulatory Problems     Diagnosis Date Noted   • Hypopotassemia 07/03/2020     Past Medical History:   Diagnosis Date   • Depression        PAST SURGICAL HISTORY  Past Surgical History:   Procedure Laterality Date   • BACK SURGERY     • GASTRIC BYPASS     • GASTRIC BYPASS  2007   • INCISION AND DRAINAGE ARM Right 7/26/2020    Procedure: DEBRIDEMENT RT THIGH AND LOWER LEG BURN;  Surgeon: Rodney Paul MD;  Location: [facility];  Service: Plastics;  Laterality: Right;   • LEG DEBRIDEMENT Right 7/26/2020    Procedure: DEBRIDEMENT RT FOREARM AND ARM BURN;  Surgeon: Rodney Paul MD;  Location: [facility];  Service: Plastics;  Laterality: Right;   • LEG DEBRIDEMENT Right 8/5/2020    Procedure: Burn Care Dressing Change to right upper limb and right lower limb and left thigh donor site under mild sedation ;  Surgeon: Rodney Paul MD;  Location: [facility];  Service: Plastics;  Laterality: Right;   • SKIN GRAFT SPLIT THICKNESS Right 7/30/2020    Procedure: SPLIT THICKNESS SKIN GRAFT to right lower limb and right upper limb;  Surgeon: Rodney Paul MD;  Location: [facility];  Service: Plastics;  Laterality: Right;   • SUBTOTAL HYSTERECTOMY         FAMILY HISTORY  Family History   Problem Relation Age of Onset   • Malig Hyperthermia Neg Hx        SOCIAL HISTORY  Social History     Socioeconomic History   • Marital status:      Spouse name: Not on file   • Number of children: Not on file   • Years of education: Not on file   • Highest education level: Not on file   Tobacco Use   • Smoking status: Current Every Day Smoker     Packs/day: 1.50     Types: Cigarettes   Substance and Sexual Activity   • Alcohol use: No   • Drug use: Yes     Types: Marijuana     Comment: heroin snorted, \"anything i can get my hands on\" "   • Sexual activity: Defer       REVIEW OF SYSTEMS  Review of Systems    All systems reviewed and negative except for those discussed in HPI.     PHYSICAL EXAM    ED Triage Vitals [04/11/21 1201]   Temp Heart Rate Resp BP SpO2   97.6 °F (36.4 °C) 106 18 -- 97 %      Temp src Heart Rate Source Patient Position BP Location FiO2 (%)   Tympanic -- -- -- --     Physical Exam  Constitutional:              I have reviewed the triage vital signs and nursing notes.      GENERAL: Distressed with position changes, cachexia, appears very malnourished  HENT: nares patent, mucous membranes are dry, there is no cheek fat and she has very hollow cheeks  EYES: no scleral icterus  NECK: no ROM limitations  CV: regular rhythm, regular rate, no murmur, no rubs no gallop  RESPIRATORY: normal effort, to auscultate bilateral  ABDOMEN: soft, flat, no focal tenderness  : Patient has multiple decubitus to her bilateral gluteus as well as a coccyx wound that is unstageable with foul smell noted to all of these wounds.  MUSCULOSKELETAL: no deformity  NEURO: alert, moves all extremities, follows commands  SKIN: warm, dry, her skin color is abnormal, she has  scab to the right elbow    LAB RESULTS  Recent Results (from the past 24 hour(s))   Comprehensive Metabolic Panel    Collection Time: 04/11/21  1:03 PM    Specimen: Blood   Result Value Ref Range    Glucose 84 65 - 99 mg/dL    BUN 12 6 - 20 mg/dL    Creatinine <0.47 (L) 0.57 - 1.00 mg/dL    Sodium 131 (L) 136 - 145 mmol/L    Potassium 3.5 3.5 - 5.2 mmol/L    Chloride 94 (L) 98 - 107 mmol/L    CO2 29.6 (H) 22.0 - 29.0 mmol/L    Calcium 8.4 (L) 8.6 - 10.5 mg/dL    Total Protein 5.6 (L) 6.0 - 8.5 g/dL    Albumin 2.50 (L) 3.50 - 5.20 g/dL    ALT (SGPT) 12 1 - 33 U/L    AST (SGOT) 15 1 - 32 U/L    Alkaline Phosphatase 193 (H) 39 - 117 U/L    Total Bilirubin 0.4 0.0 - 1.2 mg/dL    Globulin 3.1 gm/dL    A/G Ratio 0.8 g/dL    BUN/Creatinine Ratio      Anion Gap 7.4 5.0 - 15.0 mmol/L   Protime-INR     Collection Time: 04/11/21  1:03 PM    Specimen: Blood   Result Value Ref Range    Protime 12.9 11.7 - 14.2 Seconds    INR 0.99 0.90 - 1.10   Lactic Acid, Plasma    Collection Time: 04/11/21  1:03 PM    Specimen: Blood   Result Value Ref Range    Lactate 1.5 0.5 - 2.0 mmol/L   Procalcitonin    Collection Time: 04/11/21  1:03 PM    Specimen: Blood   Result Value Ref Range    Procalcitonin 0.06 0.00 - 0.25 ng/mL   Magnesium    Collection Time: 04/11/21  1:03 PM    Specimen: Blood   Result Value Ref Range    Magnesium 1.9 1.6 - 2.6 mg/dL   CBC Auto Differential    Collection Time: 04/11/21  1:03 PM    Specimen: Blood   Result Value Ref Range    WBC 8.60 3.40 - 10.80 10*3/mm3    RBC 4.41 3.77 - 5.28 10*6/mm3    Hemoglobin 13.0 12.0 - 15.9 g/dL    Hematocrit 39.3 34.0 - 46.6 %    MCV 89.1 79.0 - 97.0 fL    MCH 29.5 26.6 - 33.0 pg    MCHC 33.1 31.5 - 35.7 g/dL    RDW 12.3 12.3 - 15.4 %    RDW-SD 40.6 37.0 - 54.0 fl    MPV 8.3 6.0 - 12.0 fL    Platelets 394 140 - 450 10*3/mm3    Neutrophil % 74.5 42.7 - 76.0 %    Lymphocyte % 19.1 (L) 19.6 - 45.3 %    Monocyte % 5.7 5.0 - 12.0 %    Eosinophil % 0.1 (L) 0.3 - 6.2 %    Basophil % 0.3 0.0 - 1.5 %    Immature Grans % 0.3 0.0 - 0.5 %    Neutrophils, Absolute 6.40 1.70 - 7.00 10*3/mm3    Lymphocytes, Absolute 1.64 0.70 - 3.10 10*3/mm3    Monocytes, Absolute 0.49 0.10 - 0.90 10*3/mm3    Eosinophils, Absolute 0.01 0.00 - 0.40 10*3/mm3    Basophils, Absolute 0.03 0.00 - 0.20 10*3/mm3    Immature Grans, Absolute 0.03 0.00 - 0.05 10*3/mm3    nRBC 0.0 0.0 - 0.2 /100 WBC   Ethanol    Collection Time: 04/11/21  1:03 PM    Specimen: Blood   Result Value Ref Range    Ethanol <10 0 - 10 mg/dL    Ethanol % <0.010 %   Ammonia    Collection Time: 04/11/21  1:03 PM    Specimen: Blood   Result Value Ref Range    Ammonia 27 11 - 51 umol/L         RADIOLOGY RESULTS  XR Chest 1 View   Final Result   No evidence for acute pulmonary process. Follow-up as   clinical indications persist.       This  "report was finalized on 4/11/2021 1:23 PM by Dr. Tiago Brand M.D.                PROGRESS, DATA ANALYSIS, CONSULTS AND MEDICAL DECISION MAKING  All labs have been independently reviewed by me.  All radiology studies have been reviewed by me and discussed with radiologist dictating the report.  EKG's independently viewed and interpreted by me unless stated otherwise. Discussion below represents my analysis of pertinent findings related to patient's condition, differential diagnosis, treatment plan and final disposition.     ED Course as of Apr 11 1702   Sun Apr 11, 2021   1225 Areli with CCP is aware and will make APS report.    [EW]   1433 I viewed chest x-ray on PACS.  There are no focal infiltrates.  Patient is very emaciated    [EW]   1523 Discussed admission with Dr. Barbosa.  He agrees to admit to [facility].     [EW]      ED Course User Index  [EW] Tabitha Lyn, APRN     DDX: Cachexia, emaciation, polysubstance abuse, homelessness, unstageable ulcers, poor self-care    MDM: This patient is very malnourished and has some chronic substance abuse issues.  Due to her poor living condition she has unstageable sacral ulcer that is quite large and for this reason she will be admitted to the hospital.     Reviewed pt's history and workup with Dr. Smtih.  After a bedside evaluation, Dr. Smith agrees with the plan of care.    Based on the patient's lab findings and presenting symptoms, the doctor and I feel it is appropriate to admit the patient for further management, evaluation, and treatment.  I have discussed this with the admitting team.  I have also discussed this with the patient/family.  They are in agreement with admission.          Disposition vitals:  BP 98/69 (BP Location: Right arm, Patient Position: Lying)   Pulse 93   Temp 97.1 °F (36.2 °C) (Oral)   Resp 16   Ht 167.6 cm (66\")   Wt 40.8 kg (90 lb)   SpO2 97%   BMI 14.53 kg/m²       DIAGNOSIS  Final diagnoses:   Pressure injury of " buttock, unstageable, unspecified laterality (CMS/HCC)   Malnutrition, unspecified type (CMS/HCC)   Cachexia (CMS/HCC)   Homelessness   Polysubstance abuse (CMS/HCC)       Tabitha Vizcaino, APRN  04/11/21 9036

## 2021-04-11 NOTE — PROGRESS NOTES
Contacted APS regarding the circumstances of pts arrival to the ER and condition of pt upon arrival. APS ID # [id number]. Areli Carey RN

## 2021-04-11 NOTE — ED TRIAGE NOTES
"States \"I'm in hell, my butt is raw and my hand is swollen.\"  was in a [facility] D/T arm injury and was sent home 1 month ago. States since then she has been unable to walk and has 2 \"holes in my butt\"    Mask placed on patient in triage. Triage staff wore appropriate PPE during interaction with patient.     "

## 2021-04-11 NOTE — ED NOTES
This RN attempted to in and out cath pt for urine, no urine return noted. ROSSANA Lyn notified.      Jammie August, ALEXANDER  04/11/21 1001
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
## 2021-04-12 ENCOUNTER — APPOINTMENT (OUTPATIENT)
Dept: GENERAL RADIOLOGY | Facility: HOSPITAL | Age: 51
End: 2021-04-12

## 2021-04-12 PROBLEM — D75.839 THROMBOCYTOSIS: Status: ACTIVE | Noted: 2021-04-12

## 2021-04-12 PROBLEM — Z74.09 MOBILITY IMPAIRED: Status: ACTIVE | Noted: 2021-04-12

## 2021-04-12 PROBLEM — E87.1 HYPONATREMIA: Status: ACTIVE | Noted: 2021-04-12

## 2021-04-12 PROBLEM — E43 SEVERE MALNUTRITION (HCC): Status: ACTIVE | Noted: 2021-04-12

## 2021-04-12 LAB
AMPHET+METHAMPHET UR QL: POSITIVE
ANION GAP SERPL CALCULATED.3IONS-SCNC: 7.5 MMOL/L (ref 5–15)
BACTERIA UR QL AUTO: ABNORMAL /HPF
BARBITURATES UR QL SCN: NEGATIVE
BASOPHILS # BLD AUTO: 0.02 10*3/MM3 (ref 0–0.2)
BASOPHILS NFR BLD AUTO: 0.2 % (ref 0–1.5)
BENZODIAZ UR QL SCN: NEGATIVE
BILIRUB UR QL STRIP: ABNORMAL
BUN SERPL-MCNC: 14 MG/DL (ref 6–20)
BUN/CREAT SERPL: ABNORMAL
CALCIUM SPEC-SCNC: 8.1 MG/DL (ref 8.6–10.5)
CANNABINOIDS SERPL QL: POSITIVE
CHLORIDE SERPL-SCNC: 97 MMOL/L (ref 98–107)
CLARITY UR: ABNORMAL
CO2 SERPL-SCNC: 29.5 MMOL/L (ref 22–29)
COCAINE UR QL: NEGATIVE
COLOR UR: YELLOW
CREAT SERPL-MCNC: <0.47 MG/DL (ref 0.57–1)
DEPRECATED RDW RBC AUTO: 39.2 FL (ref 37–54)
EOSINOPHIL # BLD AUTO: 0.02 10*3/MM3 (ref 0–0.4)
EOSINOPHIL NFR BLD AUTO: 0.2 % (ref 0.3–6.2)
ERYTHROCYTE [DISTWIDTH] IN BLOOD BY AUTOMATED COUNT: 12.2 % (ref 12.3–15.4)
GLUCOSE SERPL-MCNC: 88 MG/DL (ref 65–99)
GLUCOSE UR STRIP-MCNC: NEGATIVE MG/DL
HCT VFR BLD AUTO: 38.1 % (ref 34–46.6)
HGB BLD-MCNC: 12.5 G/DL (ref 12–15.9)
HGB UR QL STRIP.AUTO: NEGATIVE
HYALINE CASTS UR QL AUTO: ABNORMAL /LPF
IMM GRANULOCYTES # BLD AUTO: 0.04 10*3/MM3 (ref 0–0.05)
IMM GRANULOCYTES NFR BLD AUTO: 0.5 % (ref 0–0.5)
KETONES UR QL STRIP: ABNORMAL
LEUKOCYTE ESTERASE UR QL STRIP.AUTO: ABNORMAL
LYMPHOCYTES # BLD AUTO: 1.29 10*3/MM3 (ref 0.7–3.1)
LYMPHOCYTES NFR BLD AUTO: 15.4 % (ref 19.6–45.3)
MCH RBC QN AUTO: 28.5 PG (ref 26.6–33)
MCHC RBC AUTO-ENTMCNC: 32.8 G/DL (ref 31.5–35.7)
MCV RBC AUTO: 86.8 FL (ref 79–97)
METHADONE UR QL SCN: NEGATIVE
MONOCYTES # BLD AUTO: 0.45 10*3/MM3 (ref 0.1–0.9)
MONOCYTES NFR BLD AUTO: 5.4 % (ref 5–12)
MRSA DNA SPEC QL NAA+PROBE: ABNORMAL
NEUTROPHILS NFR BLD AUTO: 6.58 10*3/MM3 (ref 1.7–7)
NEUTROPHILS NFR BLD AUTO: 78.3 % (ref 42.7–76)
NITRITE UR QL STRIP: POSITIVE
NRBC BLD AUTO-RTO: 0 /100 WBC (ref 0–0.2)
OPIATES UR QL: POSITIVE
OXYCODONE UR QL SCN: NEGATIVE
PH UR STRIP.AUTO: 6.5 [PH] (ref 5–8)
PLATELET # BLD AUTO: 492 10*3/MM3 (ref 140–450)
PMV BLD AUTO: 8.1 FL (ref 6–12)
POTASSIUM SERPL-SCNC: 4 MMOL/L (ref 3.5–5.2)
PROT UR QL STRIP: NEGATIVE
RBC # BLD AUTO: 4.39 10*6/MM3 (ref 3.77–5.28)
RBC # UR: ABNORMAL /HPF
REF LAB TEST METHOD: ABNORMAL
SODIUM SERPL-SCNC: 134 MMOL/L (ref 136–145)
SP GR UR STRIP: 1.02 (ref 1–1.03)
SQUAMOUS #/AREA URNS HPF: ABNORMAL /HPF
TSH SERPL DL<=0.05 MIU/L-ACNC: 0.46 UIU/ML (ref 0.27–4.2)
UROBILINOGEN UR QL STRIP: ABNORMAL
WBC # BLD AUTO: 8.4 10*3/MM3 (ref 3.4–10.8)
WBC UR QL AUTO: ABNORMAL /HPF

## 2021-04-12 PROCEDURE — 80307 DRUG TEST PRSMV CHEM ANLYZR: CPT | Performed by: NURSE PRACTITIONER

## 2021-04-12 PROCEDURE — 72170 X-RAY EXAM OF PELVIS: CPT

## 2021-04-12 PROCEDURE — 80048 BASIC METABOLIC PNL TOTAL CA: CPT | Performed by: HOSPITALIST

## 2021-04-12 PROCEDURE — 25010000002 VANCOMYCIN PER 500 MG: Performed by: INTERNAL MEDICINE

## 2021-04-12 PROCEDURE — G0378 HOSPITAL OBSERVATION PER HR: HCPCS

## 2021-04-12 PROCEDURE — 85652 RBC SED RATE AUTOMATED: CPT | Performed by: INTERNAL MEDICINE

## 2021-04-12 PROCEDURE — 81001 URINALYSIS AUTO W/SCOPE: CPT | Performed by: NURSE PRACTITIONER

## 2021-04-12 PROCEDURE — 25010000002 ENOXAPARIN PER 10 MG: Performed by: INTERNAL MEDICINE

## 2021-04-12 PROCEDURE — 85025 COMPLETE CBC W/AUTO DIFF WBC: CPT | Performed by: HOSPITALIST

## 2021-04-12 PROCEDURE — 25010000002 ONDANSETRON PER 1 MG: Performed by: HOSPITALIST

## 2021-04-12 PROCEDURE — 25010000002 HYDROMORPHONE PER 4 MG: Performed by: HOSPITALIST

## 2021-04-12 PROCEDURE — 84443 ASSAY THYROID STIM HORMONE: CPT | Performed by: INTERNAL MEDICINE

## 2021-04-12 PROCEDURE — 25010000002 PIPERACILLIN SOD-TAZOBACTAM PER 1 G: Performed by: INTERNAL MEDICINE

## 2021-04-12 PROCEDURE — 72220 X-RAY EXAM SACRUM TAILBONE: CPT

## 2021-04-12 PROCEDURE — 87641 MR-STAPH DNA AMP PROBE: CPT | Performed by: INTERNAL MEDICINE

## 2021-04-12 RX ORDER — SODIUM HYPOCHLORITE 1.25 MG/ML
SOLUTION TOPICAL 2 TIMES DAILY
Status: DISCONTINUED | OUTPATIENT
Start: 2021-04-12 | End: 2021-04-15 | Stop reason: HOSPADM

## 2021-04-12 RX ORDER — LORAZEPAM 1 MG/1
1 TABLET ORAL EVERY 6 HOURS PRN
Status: DISCONTINUED | OUTPATIENT
Start: 2021-04-12 | End: 2021-04-15 | Stop reason: HOSPADM

## 2021-04-12 RX ORDER — VANCOMYCIN HYDROCHLORIDE 1 G/200ML
1000 INJECTION, SOLUTION INTRAVENOUS ONCE
Status: COMPLETED | OUTPATIENT
Start: 2021-04-12 | End: 2021-04-12

## 2021-04-12 RX ORDER — VANCOMYCIN HYDROCHLORIDE 1 G/200ML
1000 INJECTION, SOLUTION INTRAVENOUS EVERY 12 HOURS
Status: DISCONTINUED | OUTPATIENT
Start: 2021-04-13 | End: 2021-04-13

## 2021-04-12 RX ADMIN — HYDROMORPHONE HYDROCHLORIDE 0.5 MG: 1 INJECTION, SOLUTION INTRAMUSCULAR; INTRAVENOUS; SUBCUTANEOUS at 22:16

## 2021-04-12 RX ADMIN — ENOXAPARIN SODIUM 40 MG: 100 INJECTION SUBCUTANEOUS at 14:40

## 2021-04-12 RX ADMIN — HYDROMORPHONE HYDROCHLORIDE 0.5 MG: 1 INJECTION, SOLUTION INTRAMUSCULAR; INTRAVENOUS; SUBCUTANEOUS at 08:08

## 2021-04-12 RX ADMIN — SODIUM CHLORIDE, PRESERVATIVE FREE 10 ML: 5 INJECTION INTRAVENOUS at 20:36

## 2021-04-12 RX ADMIN — LORAZEPAM 1 MG: 1 TABLET ORAL at 23:40

## 2021-04-12 RX ADMIN — HYDROMORPHONE HYDROCHLORIDE 0.5 MG: 1 INJECTION, SOLUTION INTRAMUSCULAR; INTRAVENOUS; SUBCUTANEOUS at 10:21

## 2021-04-12 RX ADMIN — HYDROMORPHONE HYDROCHLORIDE 0.5 MG: 1 INJECTION, SOLUTION INTRAMUSCULAR; INTRAVENOUS; SUBCUTANEOUS at 12:32

## 2021-04-12 RX ADMIN — HYDROCODONE BITARTRATE AND ACETAMINOPHEN 1 TABLET: 5; 325 TABLET ORAL at 05:41

## 2021-04-12 RX ADMIN — VANCOMYCIN HYDROCHLORIDE 1000 MG: 1 INJECTION, SOLUTION INTRAVENOUS at 15:19

## 2021-04-12 RX ADMIN — HYDROMORPHONE HYDROCHLORIDE 0.5 MG: 1 INJECTION, SOLUTION INTRAMUSCULAR; INTRAVENOUS; SUBCUTANEOUS at 14:18

## 2021-04-12 RX ADMIN — TAZOBACTAM SODIUM AND PIPERACILLIN SODIUM 3.38 G: 375; 3 INJECTION, SOLUTION INTRAVENOUS at 20:36

## 2021-04-12 RX ADMIN — LORAZEPAM 1 MG: 1 TABLET ORAL at 16:16

## 2021-04-12 RX ADMIN — ONDANSETRON 4 MG: 2 INJECTION INTRAMUSCULAR; INTRAVENOUS at 08:03

## 2021-04-12 RX ADMIN — DAKIN'S SOLUTION 0.125% (QUARTER STRENGTH): 0.12 SOLUTION at 23:50

## 2021-04-12 RX ADMIN — HYDROCODONE BITARTRATE AND ACETAMINOPHEN 1 TABLET: 5; 325 TABLET ORAL at 15:23

## 2021-04-12 RX ADMIN — HYDROMORPHONE HYDROCHLORIDE 0.5 MG: 1 INJECTION, SOLUTION INTRAMUSCULAR; INTRAVENOUS; SUBCUTANEOUS at 17:10

## 2021-04-12 RX ADMIN — SODIUM CHLORIDE, PRESERVATIVE FREE 10 ML: 5 INJECTION INTRAVENOUS at 08:10

## 2021-04-12 RX ADMIN — TAZOBACTAM SODIUM AND PIPERACILLIN SODIUM 3.38 G: 375; 3 INJECTION, SOLUTION INTRAVENOUS at 14:26

## 2021-04-12 NOTE — PLAN OF CARE
Goal Outcome Evaluation:  Plan of Care Reviewed With: patient  Progress: no change   Vss, c/o pain medicated with iv dilaudid and norco, wound swab for c/s collected, pt refused in and out cath for urine collection, consult wound nurse and Dr. Randall to see pt today, with stage 4 pressure ulcer on her buttocks pack with NS wet to dry, continue to monitor the pt,

## 2021-04-12 NOTE — PROGRESS NOTES
Malnutrition Severity Assessment    Patient Name:  Mary Chavez  YOB: 1970  MRN: [id number]  Admit Date:  4/11/2021    Patient meets criteria for : Severe Malnutrition    Comments:  MSA completed with severe starvation related malnutrition d/t cachexia, severely underweight BMI of 14.5, severe wt loss of 23% x 3 months, and severely inadequate intake d/t homelessness. Currently on soft/chopped diet d/t poor dentition with 25% intake. Increased needs as well for stg 4 decubitus ulcer to coccyx. Boost plus ordered TID with meals to assist in meeting needs. Will continue to follow.     Malnutrition Severity Assessment  Malnutrition Type: Starvation - Related Malnutrition     Malnutrition Type (last 8 hours)      Malnutrition Severity Assessment     Row Name 04/12/21 1000       Malnutrition Severity Assessment    Malnutrition Type  Starvation - Related Malnutrition    Row Name 04/12/21 1000       Insufficient Energy Intake     Insufficient Energy Intake Findings  Severe    Insufficient Energy Intake   <75% of est. energy requirement for > or equal to 3 months    Row Name 04/12/21 1000       Unintentional Weight Loss     Unintentional Weight Loss   Weight loss greater than 20% in one year    Row Name 04/12/21 1000       Muscle Loss    Loss of Muscle Mass Findings  Severe    Mormonism Region  Severe - deep hollowing/scooping, lack of muscle to touch, facial bones well defined    Clavicle Bone Region  Severe - protruding prominent bone    Acromion Bone Region  Severe - squared shoulders, bones, and acromion process protrusion prominent    Scapular Bone Region  Severe - prominent bones, depressions easily visible between ribs, scapula, spine, shoulders    Dorsal Hand Region  Severe - prominent depression    Patellar Region  Severe - prominent bone, square looking, very little muscle definition    Anterior Thigh Region  Severe - line/depression along thigh, obviously thin    Posterior Calf Region  Severe - thin  with very little definition/firmness    Row Name 04/12/21 1000       Fat Loss    Subcutaneous Fat Loss Findings  Severe    Orbital Region   Severe - pronounced hollowness/depression, dark circles, loose saggy skin    Upper Arm Region  Severe - mostly skin, very little space between folds, fingers touch    Thoracic & Lumbar Region  Severe - ribs visible with prominent depressions, iliac crest very prominent    Row Name 04/12/21 1000       Fluid Accumulation (Edema)    Fluid Acumulation Findings  Moderate    Fluid Accumulation   Moderate equals 2+ pitting edema    Row Name 04/12/21 1000       Criteria Met (Must meet criteria for severity in at least 2 of these categories: M Wasting, Fat Loss, Fluid, Secondary Signs, Wt. Status, Intake)    Patient meets criteria for   Severe Malnutrition          Electronically signed by:  Jammie Barrientos, MS,RD,LD  04/12/21 10:21 EDT

## 2021-04-12 NOTE — PLAN OF CARE
Goal Outcome Evaluation:  Plan of Care Reviewed With: patient  Progress: improving  Outcome Summary: Went over the plan of care with the patient. Answered all questions. All medications given without complications. Patients pain is not well controlled. Patient is very anxious and fidgety in the bed. Ativan order and COWS scroes taken. Patient is yelling out obscenties when in pain. Discussed with the patient that the behavior is not acceptable. No other issues after our conversation.

## 2021-04-12 NOTE — PROGRESS NOTES
Name: Mary Chavez ADMIT: 2021   : 1970  PCP: Murphy Carty MD    MRN: [id number] LOS: 0 days   AGE/SEX: 50 y.o. female  ROOM: [facility]     Subjective   Subjective   Lying in bed, keeps eyes closed. C/O pain from wounds. Denies fever, chest pain, dyspnea, n/v, abd pain, constipation, dysuria.     Review of Systems   Constitutional: Negative for fever.   HENT: Negative for congestion.    Respiratory: Negative for shortness of breath.    Cardiovascular: Negative for chest pain.   Gastrointestinal: Negative for abdominal pain, constipation, nausea and vomiting.   Genitourinary: Negative for difficulty urinating and dysuria.   Musculoskeletal: Negative for arthralgias and myalgias.   Skin: Positive for wound.   Neurological: Negative for headaches.   Psychiatric/Behavioral: Negative for confusion.        Objective   Objective   Vital Signs  Temp:  [96.8 °F (36 °C)-99.1 °F (37.3 °C)] 97.3 °F (36.3 °C)  Heart Rate:  [80-93] 92  Resp:  [16-18] 18  BP: ()/(59-78) 113/78  SpO2:  [97 %-100 %] 100 %  on   ;   Device (Oxygen Therapy): room air  Body mass index is 14.53 kg/m².  Physical Exam  Vitals and nursing note reviewed.   Constitutional:       General: She is not in acute distress.     Appearance: She is cachectic. She is ill-appearing.   HENT:      Head: Normocephalic.   Eyes:      General: Lids are normal.   Cardiovascular:      Rate and Rhythm: Regular rhythm.      Comments: Mild tachycardia  Pulmonary:      Effort: Pulmonary effort is normal. No respiratory distress.      Breath sounds: Normal breath sounds.   Abdominal:      General: Bowel sounds are normal. There is no distension.      Palpations: Abdomen is soft.   Musculoskeletal:      Cervical back: Neck supple.      Right lower leg: No edema.      Left lower leg: No edema.   Skin:     General: Skin is warm and dry.      Comments: Wound dressed, not removed; pictures reviewed   Neurological:      General: No focal deficit present.       Mental Status: She is alert and oriented to person, place, and time.   Psychiatric:         Mood and Affect: Affect is flat.         Behavior: Behavior is cooperative.         Results Review     I reviewed the patient's new clinical results.  Results from last 7 days   Lab Units 04/12/21  0642 04/11/21  1303   WBC 10*3/mm3 8.40 8.60   HEMOGLOBIN g/dL 12.5 13.0   PLATELETS 10*3/mm3 492* 394     Results from last 7 days   Lab Units 04/12/21  0642 04/11/21  1303   SODIUM mmol/L 134* 131*   POTASSIUM mmol/L 4.0 3.5   CHLORIDE mmol/L 97* 94*   CO2 mmol/L 29.5* 29.6*   BUN mg/dL 14 12   CREATININE mg/dL <0.47* <0.47*   GLUCOSE mg/dL 88 84   CrCl cannot be calculated (This lab value cannot be used to calculate CrCl because it is not a number: <0.47).  Results from last 7 days   Lab Units 04/11/21  1303   ALBUMIN g/dL 2.50*   BILIRUBIN mg/dL 0.4   ALK PHOS U/L 193*   AST (SGOT) U/L 15   ALT (SGPT) U/L 12     Results from last 7 days   Lab Units 04/12/21  0642 04/11/21  1303   CALCIUM mg/dL 8.1* 8.4*   ALBUMIN g/dL  --  2.50*   MAGNESIUM mg/dL  --  1.9     Results from last 7 days   Lab Units 04/11/21  1303   PROCALCITONIN ng/mL 0.06   LACTATE mmol/L 1.5     COVID19   Date Value Ref Range Status   04/11/2021 Not Detected Not Detected - Ref. Range Final   01/17/2021 Not Detected Not Detected - Ref. Range Final     No results found for: HGBA1C, POCGLU    XR Chest 1 View  Narrative: XR CHEST 1 VW-     HISTORY: Female who is 50 years-old,  short of breath     TECHNIQUE: Frontal views of the chest     COMPARISON: 06/13/2018, correlated with right humerus x-ray from  01/17/2021     FINDINGS: Heart, mediastinum and pulmonary vasculature are unremarkable.  No focal pulmonary consolidation, pleural effusion, or pneumothorax. 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     Impression: No evidence for acute pulmonary process. Follow-up as  clinical indications persist.     This report was  finalized on 4/11/2021 1:23 PM by Dr. Tiago Brand M.D.       Scheduled Medications  enoxaparin, 40 mg, Subcutaneous, Q24H  piperacillin-tazobactam, 3.375 g, Intravenous, Once  piperacillin-tazobactam, 3.375 g, Intravenous, Q8H  sodium chloride, 10 mL, Intravenous, Q12H  vancomycin, 1,000 mg, Intravenous, Once  [START ON 4/13/2021] vancomycin, 1,000 mg, Intravenous, Q12H    Infusions  Pharmacy to dose vancomycin,     Diet  Diet Soft Texture; Chopped       Assessment/Plan     Active Hospital Problems    Diagnosis  POA   • **Pressure injury of buttock, unstageable (CMS/HCC) [L89.300]  Yes   • Severe malnutrition (CMS/HCC) [E43]  Yes   • Mobility impaired [Z74.09]  Yes   • Hyponatremia [E87.1]  Yes   • Thrombocytosis (CMS/HCC) [D47.3]  Yes   • Cachexia (CMS/HCC) [R64]  Yes   • Polysubstance abuse (CMS/[facility]) [F19.10]  Yes   • Chronic pain syndrome [G89.4]  Yes   • Seizures (CMS/HCC) [R56.9]  Yes      Resolved Hospital Problems   No resolved problems to display.       50 y.o. female admitted with Pressure injury of buttock, unstageable (CMS/HCC).    -Plastic surgery consult pending. Continue pain control. Add broad spectrum antibiotics. Follow cultures  -Appreciate Access assistance  -Nutrition consult  -Labs stable  -Home meds reviewed; hx of seizures, prescribed keppra & zonisamide but unclear if pt has been taking medications. D/W RN. May benefit from Neurology consult    · Lovenox 40 mg SC daily for DVT prophylaxis.  · Full code.  · Discussed with patient, nursing staff and Dr. Tobar.  · Anticipate discharge TBD       KACIE Swift  [facility]  04/12/21  13:47 EDT

## 2021-04-12 NOTE — DISCHARGE PLACEMENT REQUEST
"Mary Chavez (50 y.o. Female)     Date of Birth Social Security Number Address Home Phone MRN    1970  [place] [id number] [phone] [id number]    [employer] Marital Status          [employer]        Admission Date Admission Type Admitting Provider Attending Provider Department, Room/Bed    4/11/21 Emergency Nakul Barbosa MD Hussein, Samer H, MD [address], P681/1    Discharge Date Discharge Disposition Discharge Destination                       Attending Provider: Juaquin Tobar MD    Allergies: No Known Allergies    Isolation: None   Infection: MRSA/History Only (01/17/21)   Code Status: CPR    Ht: 167.6 cm (66\")   Wt: 40.8 kg (90 lb)    Admission Cmt: None   Principal Problem: Pressure injury of buttock, unstageable (CMS/[facility]) [L89.300]                 Active Insurance as of 4/11/2021     Primary Coverage     Payor Plan Insurance Group Employer/Plan Group    [place]      Payor Plan Address Payor Plan Phone Number Payor Plan Fax Number Effective Dates    [address] [phone]  4/11/2021 - None Entered    [facility] [id number]       Subscriber Name Subscriber Birth Date Member ID       MARY CHAVEZ IDALIA 1970 [id number]                 Emergency Contacts      (Rel.) Home Phone Work Phone Mobile Phone    Scott[employer] (Spouse) [phone] -- --            "
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
## 2021-04-12 NOTE — PROGRESS NOTES
[facility]  Clinical Pharmacy Department     Vancomycin Pharmacokinetic Note    Mary Chavez is a 50 y.o. female who is on day #1 of pharmacy to dose vancomycin for SSTI.    Target level: AUC24 400-600  Consulting Provider:  Dr. Tobar  Current Vancomycin Dose:   1000 mg (24.5 mg/kg) IV every  12  hours  Planned Duration of Therapy: 5  Other Antimicrobials: Zosyn EI    Allergies  Allergies as of 04/11/2021   • (No Known Allergies)       Microbiology:  Microbiology Results (last 10 days)     Procedure Component Value - Date/Time    Wound Culture - Wound, Buttock [[id number]] Collected: 04/11/21 1951    Lab Status: Preliminary result Specimen: Wound from Buttock Updated: 04/12/21 1255     Wound Culture Culture in progress     Gram Stain Few (2+) WBCs seen      Rare (1+) Gram positive cocci    COVID PRE-OP / PRE-PROCEDURE SCREENING ORDER (NO ISOLATION) - Swab, Nasopharynx [[id number]]  (Normal) Collected: 04/11/21 1305    Lab Status: Final result Specimen: Swab from Nasopharynx Updated: 04/11/21 1733    Narrative:      The following orders were created for panel order COVID PRE-OP / PRE-PROCEDURE SCREENING ORDER (NO ISOLATION) - Swab, Nasopharynx.  Procedure                               Abnormality         Status                     ---------                               -----------         ------                     COVID-19,APTIMA PANTHER,...[[id number]]  Normal              Final result                 Please view results for these tests on the individual orders.    COVID-19,APTIMA PANTHER,[facility] IN-HOUSE, NP/OP SWAB IN UTM/VTM/SALINE TRANSPORT MEDIA,24 HR TAT - Swab, Nasopharynx [[id number]]  (Normal) Collected: 04/11/21 1305    Lab Status: Final result Specimen: Swab from Nasopharynx Updated: 04/11/21 1733     COVID19 Not Detected    Narrative:      Fact sheet for providers: https://www.fda.gov/media/497167/download     Fact sheet for patients: https://www.fda.gov/media/349509/download    Test performed by RT PCR.  "   Blood Culture - Blood, Arm, Right [[id number]] Collected: 04/11/21 1303    Lab Status: Preliminary result Specimen: Blood from Arm, Right Updated: 04/12/21 1315     Blood Culture No growth at 24 hours             Vitals/Labs/I&O  167.6 cm (66\")  40.8 kg (90 lb)  Body mass index is 14.53 kg/m².   Temp:  [96.8 °F (36 °C)-99.1 °F (37.3 °C)] 97.3 °F (36.3 °C)  Heart Rate:  [80-93] 92  Resp:  [16-18] 18  BP: ()/(59-78) 113/78    Results from last 7 days   Lab Units 04/12/21  0642 04/11/21  1303   WBC 10*3/mm3 8.40 8.60     Results from last 7 days   Lab Units 04/11/21  1303   LACTATE mmol/L 1.5      Results from last 7 days   Lab Units 04/11/21  1303   PROCALCITONIN ng/mL 0.06                  CrCl cannot be calculated (This lab value cannot be used to calculate CrCl because it is not a number: <0.47).  Results from last 7 days   Lab Units 04/12/21  0642 04/11/21  1303   BUN mg/dL 14 12   CREATININE mg/dL <0.47* <0.47*     Intake & Output (last 3 days)       04/09 0701 - 04/10 0700 04/10 0701 - 04/11 0700 04/11 0701 - 04/12 0700 04/12 0701 - 04/13 0700    P.O.   510     Total Intake(mL/kg)   510 (12.5)     Net   +510             Urine Unmeasured Occurrence   2 x          VANCOMYCIN DOSING SCHEDULE ( INCLUDING LEVELS)  4/12          ~1500 Vancomycin 1000mg IV (24.5mg/kg) load  4/13   0300 1500  Vancomycin 1000mg IV q12h  4/14   0300 1500            4/14  1430   Vancomycin trough        Assessment/Plan:    Assessment:  Bayesian analysis of the most recent level(s) using iMedX provides the following patient-specific pharmacokinetic parameters:   CL: 3.62 L/h   Vd: 34 L   T1/2: 9.36 h  If the predicted trough on this regimen is not within what was previously considered a \"target trough range\", the AUC24 (a stronger predictor of vancomycin efficacy) is predicted to achieve the accepted target of 400-600 mg*h/L. To best balance efficacy and toxicity, we will be targeting AUC24 in this patient rather than " steady-state trough levels.     Initiating the regimen of 1000 mg (24.5mg/kg) IV every 12 hours gives a predicted steady-state trough of 11.5 mg/L and AUC24 of 430 mg*h/L based upon population pharmacokinetics and this patient's specific parameters.    Recommendations/Plan:  -Initiate vancomycin 1000 mg (24.5 mg/kg) IV every 12 hours   -Obtain vancomycin level   prior to the 5th dose or sooner if acute changes   -Continue to monitor SCr    Thank you for involving pharmacy in this patient's care. Please contact pharmacy with any questions or concerns.                           Sadie Heaton [facility]  Clinical Pharmacist  04/12/21 13:42 EDT
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
## 2021-04-12 NOTE — CONSULTS
"Access Ctr Note.    Chart reviewed.     On approach found Pt abed & watching tv. Introduced self & explained role. She said, \"they were just in here (to talk about substance use)\". She said another provider had been in and provided [facility] tx resources. Again invited Pt for interview to which she replied, \"It's pointless!\" re: having a conversation with this writer. She went on to say she was sober. Explained that her UDS was positive for Meth, Opiates and THC. She said, \"I did that by choice. I was in so much pain I wanted to shoot myself. I'm just in so much fucking pain.\"     Asked if she wanted to kill self currently, she said, \"no\". Pt also reported no HI or wish to die.     Pt's main concern is her pain associated with her pressure ulcers, again being uninterested in an Access Ctr consult. Informed Pt that if she changes her mind or has questions about resources or would like additional resources she can ask that Access Ctr return. She said, \"I will; thank you.\"    It is notable that in previous admissions there have been concerns about contraband being brought into hospital for Pt.         Full PPE, incl mask & eyewear (gown & gloves when necessary) worn throughout encounter. Pt without mask.   Appropriate hand hygiene performed before & after Pt contact & donning/doffing PPE.   "

## 2021-04-13 ENCOUNTER — APPOINTMENT (OUTPATIENT)
Dept: GENERAL RADIOLOGY | Facility: HOSPITAL | Age: 51
End: 2021-04-13

## 2021-04-13 PROBLEM — Z86.69 HISTORY OF SEIZURE DISORDER: Status: ACTIVE | Noted: 2021-04-13

## 2021-04-13 PROBLEM — E87.6 HYPOKALEMIA: Status: ACTIVE | Noted: 2021-04-13

## 2021-04-13 LAB
ALBUMIN SERPL-MCNC: 2.6 G/DL (ref 3.5–5.2)
ALBUMIN/GLOB SERPL: 0.9 G/DL
ALP SERPL-CCNC: 183 U/L (ref 39–117)
ALT SERPL W P-5'-P-CCNC: 14 U/L (ref 1–33)
ANION GAP SERPL CALCULATED.3IONS-SCNC: 12.8 MMOL/L (ref 5–15)
AST SERPL-CCNC: 12 U/L (ref 1–32)
BASOPHILS # BLD AUTO: 0.01 10*3/MM3 (ref 0–0.2)
BASOPHILS NFR BLD AUTO: 0.1 % (ref 0–1.5)
BILIRUB SERPL-MCNC: 0.3 MG/DL (ref 0–1.2)
BUN SERPL-MCNC: 10 MG/DL (ref 6–20)
BUN/CREAT SERPL: 31.3 (ref 7–25)
CALCIUM SPEC-SCNC: 8.3 MG/DL (ref 8.6–10.5)
CHLORIDE SERPL-SCNC: 91 MMOL/L (ref 98–107)
CO2 SERPL-SCNC: 22.2 MMOL/L (ref 22–29)
CREAT SERPL-MCNC: 0.32 MG/DL (ref 0.57–1)
DEPRECATED RDW RBC AUTO: 39.2 FL (ref 37–54)
EOSINOPHIL # BLD AUTO: 0 10*3/MM3 (ref 0–0.4)
EOSINOPHIL NFR BLD AUTO: 0 % (ref 0.3–6.2)
ERYTHROCYTE [DISTWIDTH] IN BLOOD BY AUTOMATED COUNT: 12.4 % (ref 12.3–15.4)
ERYTHROCYTE [SEDIMENTATION RATE] IN BLOOD: 25 MM/HR (ref 0–30)
GFR SERPL CREATININE-BSD FRML MDRD: >150 ML/MIN/1.73
GLOBULIN UR ELPH-MCNC: 2.9 GM/DL
GLUCOSE SERPL-MCNC: 229 MG/DL (ref 65–99)
HCT VFR BLD AUTO: 39.3 % (ref 34–46.6)
HGB BLD-MCNC: 13.1 G/DL (ref 12–15.9)
IMM GRANULOCYTES # BLD AUTO: 0.02 10*3/MM3 (ref 0–0.05)
IMM GRANULOCYTES NFR BLD AUTO: 0.3 % (ref 0–0.5)
LYMPHOCYTES # BLD AUTO: 0.94 10*3/MM3 (ref 0.7–3.1)
LYMPHOCYTES NFR BLD AUTO: 11.8 % (ref 19.6–45.3)
MCH RBC QN AUTO: 28.7 PG (ref 26.6–33)
MCHC RBC AUTO-ENTMCNC: 33.3 G/DL (ref 31.5–35.7)
MCV RBC AUTO: 86.2 FL (ref 79–97)
MONOCYTES # BLD AUTO: 0.26 10*3/MM3 (ref 0.1–0.9)
MONOCYTES NFR BLD AUTO: 3.3 % (ref 5–12)
NEUTROPHILS NFR BLD AUTO: 6.71 10*3/MM3 (ref 1.7–7)
NEUTROPHILS NFR BLD AUTO: 84.5 % (ref 42.7–76)
NRBC BLD AUTO-RTO: 0 /100 WBC (ref 0–0.2)
PLATELET # BLD AUTO: 542 10*3/MM3 (ref 140–450)
PMV BLD AUTO: 8.1 FL (ref 6–12)
POTASSIUM SERPL-SCNC: 3.2 MMOL/L (ref 3.5–5.2)
PROT SERPL-MCNC: 5.5 G/DL (ref 6–8.5)
RBC # BLD AUTO: 4.56 10*6/MM3 (ref 3.77–5.28)
SODIUM SERPL-SCNC: 126 MMOL/L (ref 136–145)
WBC # BLD AUTO: 7.94 10*3/MM3 (ref 3.4–10.8)

## 2021-04-13 PROCEDURE — 25010000002 PIPERACILLIN SOD-TAZOBACTAM PER 1 G: Performed by: INTERNAL MEDICINE

## 2021-04-13 PROCEDURE — 25010000002 VANCOMYCIN PER 500 MG: Performed by: INTERNAL MEDICINE

## 2021-04-13 PROCEDURE — 25010000002 ENOXAPARIN PER 10 MG: Performed by: INTERNAL MEDICINE

## 2021-04-13 PROCEDURE — 97110 THERAPEUTIC EXERCISES: CPT

## 2021-04-13 PROCEDURE — 73060 X-RAY EXAM OF HUMERUS: CPT

## 2021-04-13 PROCEDURE — 85025 COMPLETE CBC W/AUTO DIFF WBC: CPT | Performed by: INTERNAL MEDICINE

## 2021-04-13 PROCEDURE — 97162 PT EVAL MOD COMPLEX 30 MIN: CPT

## 2021-04-13 PROCEDURE — 25010000002 HYDROMORPHONE PER 4 MG: Performed by: HOSPITALIST

## 2021-04-13 PROCEDURE — 80053 COMPREHEN METABOLIC PANEL: CPT | Performed by: INTERNAL MEDICINE

## 2021-04-13 RX ORDER — SODIUM CHLORIDE 0.9 % (FLUSH) 0.9 %
10 SYRINGE (ML) INJECTION EVERY 12 HOURS SCHEDULED
Status: CANCELLED | OUTPATIENT
Start: 2021-04-13

## 2021-04-13 RX ORDER — DULOXETIN HYDROCHLORIDE 30 MG/1
30 CAPSULE, DELAYED RELEASE ORAL DAILY
Status: DISCONTINUED | OUTPATIENT
Start: 2021-04-13 | End: 2021-04-15 | Stop reason: HOSPADM

## 2021-04-13 RX ORDER — SODIUM CHLORIDE 0.9 % (FLUSH) 0.9 %
10 SYRINGE (ML) INJECTION AS NEEDED
Status: CANCELLED | OUTPATIENT
Start: 2021-04-13

## 2021-04-13 RX ORDER — SODIUM CHLORIDE 0.9 % (FLUSH) 0.9 %
20 SYRINGE (ML) INJECTION AS NEEDED
Status: CANCELLED | OUTPATIENT
Start: 2021-04-13

## 2021-04-13 RX ORDER — LEVETIRACETAM 500 MG/1
1000 TABLET ORAL 2 TIMES DAILY
Status: DISCONTINUED | OUTPATIENT
Start: 2021-04-13 | End: 2021-04-15 | Stop reason: HOSPADM

## 2021-04-13 RX ADMIN — TAZOBACTAM SODIUM AND PIPERACILLIN SODIUM 3.38 G: 375; 3 INJECTION, SOLUTION INTRAVENOUS at 11:48

## 2021-04-13 RX ADMIN — TAZOBACTAM SODIUM AND PIPERACILLIN SODIUM 3.38 G: 375; 3 INJECTION, SOLUTION INTRAVENOUS at 04:13

## 2021-04-13 RX ADMIN — LORAZEPAM 1 MG: 1 TABLET ORAL at 06:57

## 2021-04-13 RX ADMIN — LEVETIRACETAM 1000 MG: 500 TABLET, FILM COATED ORAL at 16:08

## 2021-04-13 RX ADMIN — DAKIN'S SOLUTION 0.125% (QUARTER STRENGTH): 0.12 SOLUTION at 12:38

## 2021-04-13 RX ADMIN — LORAZEPAM 1 MG: 1 TABLET ORAL at 13:40

## 2021-04-13 RX ADMIN — VANCOMYCIN HYDROCHLORIDE 1000 MG: 1 INJECTION, SOLUTION INTRAVENOUS at 02:25

## 2021-04-13 RX ADMIN — HYDROCODONE BITARTRATE AND ACETAMINOPHEN 1 TABLET: 5; 325 TABLET ORAL at 18:45

## 2021-04-13 RX ADMIN — DAKIN'S SOLUTION 0.125% (QUARTER STRENGTH): 0.12 SOLUTION at 21:32

## 2021-04-13 RX ADMIN — HYDROCODONE BITARTRATE AND ACETAMINOPHEN 1 TABLET: 5; 325 TABLET ORAL at 08:03

## 2021-04-13 RX ADMIN — SODIUM CHLORIDE, PRESERVATIVE FREE 10 ML: 5 INJECTION INTRAVENOUS at 21:33

## 2021-04-13 RX ADMIN — LEVETIRACETAM 1000 MG: 500 TABLET, FILM COATED ORAL at 23:48

## 2021-04-13 RX ADMIN — HYDROCODONE BITARTRATE AND ACETAMINOPHEN 1 TABLET: 5; 325 TABLET ORAL at 23:49

## 2021-04-13 RX ADMIN — HYDROMORPHONE HYDROCHLORIDE 0.5 MG: 1 INJECTION, SOLUTION INTRAMUSCULAR; INTRAVENOUS; SUBCUTANEOUS at 11:48

## 2021-04-13 RX ADMIN — ENOXAPARIN SODIUM 40 MG: 100 INJECTION SUBCUTANEOUS at 13:39

## 2021-04-13 RX ADMIN — HYDROMORPHONE HYDROCHLORIDE 0.5 MG: 1 INJECTION, SOLUTION INTRAMUSCULAR; INTRAVENOUS; SUBCUTANEOUS at 21:32

## 2021-04-13 RX ADMIN — DULOXETINE HYDROCHLORIDE 30 MG: 30 CAPSULE, DELAYED RELEASE ORAL at 18:45

## 2021-04-13 RX ADMIN — HYDROCODONE BITARTRATE AND ACETAMINOPHEN 1 TABLET: 5; 325 TABLET ORAL at 13:39

## 2021-04-13 RX ADMIN — LORAZEPAM 1 MG: 1 TABLET ORAL at 21:32

## 2021-04-13 RX ADMIN — HYDROMORPHONE HYDROCHLORIDE 0.5 MG: 1 INJECTION, SOLUTION INTRAMUSCULAR; INTRAVENOUS; SUBCUTANEOUS at 06:57

## 2021-04-13 RX ADMIN — HYDROMORPHONE HYDROCHLORIDE 0.5 MG: 1 INJECTION, SOLUTION INTRAMUSCULAR; INTRAVENOUS; SUBCUTANEOUS at 04:13

## 2021-04-13 RX ADMIN — HYDROMORPHONE HYDROCHLORIDE 0.5 MG: 1 INJECTION, SOLUTION INTRAMUSCULAR; INTRAVENOUS; SUBCUTANEOUS at 00:19

## 2021-04-13 NOTE — PROGRESS NOTES
"    Name: Mary Chavez ADMIT: 2021   : 1970  PCP: Murphy Carty MD    MRN: [id number] LOS: 0 days   AGE/SEX: 50 y.o. female  ROOM: [facility]     Subjective   Subjective   Continues to c/o pain in buttock. Nursing reports pt has been agitated and verbally aggressive at times; when addressed with pt, she denies. She just \"wants to know what I need to do to get out of here\". Her main complaint is pain and that she's mad at herself for \"messing up my life\". Denies fever, nausea, chest pain, shortness of breath    Review of Systems   Constitutional: Negative for fever.   HENT: Negative for congestion.    Respiratory: Negative for shortness of breath.    Cardiovascular: Negative for chest pain.   Gastrointestinal: Negative for nausea.   Genitourinary: Negative for dysuria.   Musculoskeletal:        Buttock wound pain  Limited use of LUE w/known fx   Skin: Positive for wound.   Neurological: Positive for weakness. Negative for headaches.   Psychiatric/Behavioral: Positive for agitation.        Objective   Objective   Vital Signs  Temp:  [96.3 °F (35.7 °C)-98.4 °F (36.9 °C)] 96.3 °F (35.7 °C)  Heart Rate:  [] 98  Resp:  [16-18] 16  BP: (100-142)/(68-78) 142/74  SpO2:  [98 %-100 %] 98 %  on   ;   Device (Oxygen Therapy): room air  Body mass index is 14.53 kg/m².  Physical Exam  Vitals and nursing note reviewed.   Constitutional:       General: She is not in acute distress.     Appearance: She is cachectic. She is ill-appearing.   HENT:      Head: Normocephalic.      Mouth/Throat:      Mouth: Mucous membranes are dry.   Eyes:      Conjunctiva/sclera: Conjunctivae normal.   Cardiovascular:      Rate and Rhythm: Normal rate and regular rhythm.   Pulmonary:      Effort: Pulmonary effort is normal. No respiratory distress.      Breath sounds: Normal breath sounds.   Abdominal:      General: There is no distension.      Palpations: Abdomen is soft.   Musculoskeletal:      Cervical back: Neck supple.      " Right lower leg: No edema.      Left lower leg: No edema.   Skin:     General: Skin is warm and dry.   Neurological:      Mental Status: She is alert and oriented to person, place, and time.   Psychiatric:         Mood and Affect: Affect is flat.         Behavior: Behavior is withdrawn. Behavior is cooperative.         Results Review     I reviewed the patient's new clinical results.  Results from last 7 days   Lab Units 04/13/21  0907 04/12/21  0642 04/11/21  1303   WBC 10*3/mm3 7.94 8.40 8.60   HEMOGLOBIN g/dL 13.1 12.5 13.0   PLATELETS 10*3/mm3 542* 492* 394     Results from last 7 days   Lab Units 04/13/21  0907 04/12/21  0642 04/11/21  1303   SODIUM mmol/L 126* 134* 131*   POTASSIUM mmol/L 3.2* 4.0 3.5   CHLORIDE mmol/L 91* 97* 94*   CO2 mmol/L 22.2 29.5* 29.6*   BUN mg/dL 10 14 12   CREATININE mg/dL 0.32* <0.47* <0.47*   GLUCOSE mg/dL 229* 88 84   Estimated Creatinine Clearance: 134 mL/min (A) (by C-G formula based on SCr of 0.32 mg/dL (L)).  Results from last 7 days   Lab Units 04/13/21  0907 04/11/21  1303   ALBUMIN g/dL 2.60* 2.50*   BILIRUBIN mg/dL 0.3 0.4   ALK PHOS U/L 183* 193*   AST (SGOT) U/L 12 15   ALT (SGPT) U/L 14 12     Results from last 7 days   Lab Units 04/13/21  0907 04/12/21  0642 04/11/21  1303   CALCIUM mg/dL 8.3* 8.1* 8.4*   ALBUMIN g/dL 2.60*  --  2.50*   MAGNESIUM mg/dL  --   --  1.9     Results from last 7 days   Lab Units 04/11/21  1303   PROCALCITONIN ng/mL 0.06   LACTATE mmol/L 1.5     COVID19   Date Value Ref Range Status   04/11/2021 Not Detected Not Detected - Ref. Range Final   01/17/2021 Not Detected Not Detected - Ref. Range Final     No results found for: HGBA1C, POCGLU    XR Pelvis 1 or 2 View, XR Sacrum & Coccyx  ONE-VIEW AP PELVIS AND THREE-VIEW SACRUM AND COCCYX     HISTORY: Stage 4 sacral decubitus ulcers.     FINDINGS: The examination is quite limited because of poor patient  cooperation and motion. There appears to be prominent diffuse  osteoporosis combined with a  large amount of bowel gas and large and  small bowel loops which somewhat obscures bony detail. The lateral view  shows air likely extending into deep decubitus ulcers at the level of  the distal sacrum. No definite adjacent bony destruction is seen by  plain film.     This report was finalized on 4/12/2021 6:25 PM by Dr. Dave Miller M.D.       Scheduled Medications  enoxaparin, 40 mg, Subcutaneous, Q24H  levETIRAcetam, 1,000 mg, Oral, BID  piperacillin-tazobactam, 3.375 g, Intravenous, Q8H  sodium chloride, 10 mL, Intravenous, Q12H  sodium hypochlorite, , Topical, BID  vancomycin, 750 mg, Intravenous, Q8H    Infusions  Pharmacy to dose vancomycin,     Diet  Diet Soft Texture; Chopped       Assessment/Plan     Active Hospital Problems    Diagnosis  POA   • **Pressure injury of buttock, unstageable (CMS/HCC) [L89.300]  Yes   • Severe malnutrition (CMS/HCC) [E43]  Yes   • Mobility impaired [Z74.09]  Yes   • Hyponatremia [E87.1]  Yes   • Thrombocytosis (CMS/HCC) [D47.3]  Yes   • Cachexia (CMS/HCC) [R64]  Yes   • Polysubstance abuse (CMS/HCC) [F19.10]  Yes   • Chronic pain syndrome [G89.4]  Yes   • Seizures (CMS/HCC) [R56.9]  Yes      Resolved Hospital Problems   No resolved problems to display.       50 y.o. female admitted with Pressure injury of buttock, unstageable (CMS/HCC).    -Appreciate PRS assistance. Continue LWC w/Dakins, pressure relief/offloading, low air loss mattress, adequate nutrition. Will need ongoing follow up in Wound Care clinic of pt's choice.  -Wound culture + MRSA, susceptibility pending. Also + MRSA nares. Contact isolation. Continues on zosyn & Vanc. Blood cultures NGTD. ESR wnl. Pelvic & sacral xrays reviewed with Dr. Tobar; no bony destruction  -Pain difficult to control. Continue dilaudid & Norco  -Lorazepam prn for anxiety/withdrawal  -Na & K levels low today & Glucose high compared to yesterday; will repeat BMP  -Restart keppra for seizure hx  -Plts higher today, likely reactive.  Continue to monitor  -Rt humerus fx: known from hospitalization in January. No ortho follow up. Check follow up xray    · Lovenox 40 mg SC daily for DVT prophylaxis.  · Full code.  · Discussed with patient, nursing staff and Dr. Tobar.  · Anticipate discharge TBD       KACIE Swift  [facility]  04/13/21  13:22 EDT

## 2021-04-13 NOTE — PROGRESS NOTES
Continued Stay Note  [facility]     Patient Name: Mary Chavez  MRN: [id number]  Today's Date: 4/13/2021    Admit Date: 4/11/2021    Discharge Plan     Row Name 04/13/21 1247       Plan    Plan  [facility]    Plan Comments  [facility], [facility]. Spoke with patient who is in agreement to DC plan. Stated spouse will provide transportation at [place]. Requested MD to speak with spouse, Marc Chavez at [phone]). Informed RN who stated she was aware.    Row Name 04/13/21 1007       Plan    Plan  Pending SNF referrels    Plan Comments  Per Kathy with [facility] for [facility] and [place], she is waiting to hear back from the facility. Stated patient needs to stay 30 days for her insurance to cover. Facility was going to call and speak with her and family about this. Will inform CCP.  left for Collette with Barb regarding referral to [facility].        Discharge Codes    No documentation.             Areli Montes De Oca, RN
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
## 2021-04-13 NOTE — PLAN OF CARE
Goal Outcome Evaluation:  Plan of Care Reviewed With: patient     Outcome Summary: Pt. is a 50 year old Female admitted to the hospital with a Sacral Decub. Ulcer and h/o Polysubstance abuse.  Pt. currently presents with decreased strength, decreased ROM, decreased balance, and decreased tolerance to functional activity.  This AM, pt. requires Max. assist x 2 for bed mobility and once sitting EOB, was unable to tolarate positioning due to buttock pain and insisted on laying back down, refusing attemps to stand.  Pt. will benefit from continued skilled inpt. P.T. to address her functional deficits and to assist pt. in regaining her maximum level of independence with functional mobility.    Patient was wearing a face mask during this therapy encounter. Therapist used appropriate personal protective equipment including eye protection, mask, and gloves.  Mask used was standard procedure mask. Appropriate PPE was worn during the entire therapy session. Hand hygiene was completed before and after therapy session. Patient is not in enhanced droplet precautions.

## 2021-04-13 NOTE — PROGRESS NOTES
[facility]  Clinical Pharmacy Department     Vancomycin Pharmacokinetic Note    Mary Chavez is a 50 y.o. female who is on day #2 of pharmacy to dose vancomycin for SSTI.    Target level: AUC24 400-600  Consulting Provider:  Dr. Tobar  Current Vancomycin Dose:   1000 mg (24.5 mg/kg) IV every  12  hours  Planned Duration of Therapy: 5  Other Antimicrobials: Zosyn EI    Allergies  Allergies as of 04/11/2021   • (No Known Allergies)       Microbiology:  Microbiology Results (last 10 days)     Procedure Component Value - Date/Time    MRSA Screen, PCR (Inpatient) - Swab, Nares [[id number]]  (Abnormal) Collected: 04/12/21 1417    Lab Status: Final result Specimen: Swab from Nares Updated: 04/12/21 1817     MRSA PCR MRSA Detected    Wound Culture - Wound, Buttock [[id number]]  (Abnormal) Collected: 04/11/21 1951    Lab Status: Preliminary result Specimen: Wound from Buttock Updated: 04/13/21 0713     Wound Culture Scant growth (1+) Staphylococcus aureus, MRSA     Comment: Methicillin resistant Staphylococcus aureus, Patient may be an isolation risk.         Scant growth (1+) Normal Skin Mignon     Gram Stain Few (2+) WBCs seen      Rare (1+) Gram positive cocci    Blood Culture - Blood, Arm, Left [[id number]] Collected: 04/11/21 1536    Lab Status: Preliminary result Specimen: Blood from Arm, Left Updated: 04/12/21 1545     Blood Culture No growth at 24 hours    COVID PRE-OP / PRE-PROCEDURE SCREENING ORDER (NO ISOLATION) - Swab, Nasopharynx [[id number]]  (Normal) Collected: 04/11/21 1305    Lab Status: Final result Specimen: Swab from Nasopharynx Updated: 04/11/21 1733    Narrative:      The following orders were created for panel order COVID PRE-OP / PRE-PROCEDURE SCREENING ORDER (NO ISOLATION) - Swab, Nasopharynx.  Procedure                               Abnormality         Status                     ---------                               -----------         ------                     COVID-19,APTIMA  "PANTHER,...[[id number]]  Normal              Final result                 Please view results for these tests on the individual orders.    COVID-19,APTIMA PANTHER,[facility] IN-HOUSE, NP/OP SWAB IN UTM/VTM/[facility] TRANSPORT MEDIA,24 HR TAT - Swab, Nasopharynx [[id number]]  (Normal) Collected: 04/11/21 1305    Lab Status: Final result Specimen: Swab from Nasopharynx Updated: 04/11/21 1733     COVID19 Not Detected    Narrative:      Fact sheet for providers: https://www.fda.gov/media/927204/download     Fact sheet for patients: https://www.fda.gov/media/738080/download    Test performed by RT PCR.    Blood Culture - Blood, Arm, Right [[id number]] Collected: 04/11/21 1303    Lab Status: Preliminary result Specimen: Blood from Arm, Right Updated: 04/12/21 1315     Blood Culture No growth at 24 hours             Vitals/Labs/I&O  167.6 cm (66\")  40.8 kg (90 lb)  Body mass index is 14.53 kg/m².   Temp:  [97 °F (36.1 °C)-98.4 °F (36.9 °C)] 97.9 °F (36.6 °C)  Heart Rate:  [] 80  Resp:  [16-18] 16  BP: (100-118)/(68-78) 118/70    Results from last 7 days   Lab Units 04/13/21  0907 04/12/21  0642 04/11/21  1303   WBC 10*3/mm3 7.94 8.40 8.60     Results from last 7 days   Lab Units 04/11/21  1303   LACTATE mmol/L 1.5      Results from last 7 days   Lab Units 04/11/21  1303   PROCALCITONIN ng/mL 0.06           Results from last 7 days   Lab Units 04/12/21  2244   SED RATE mm/hr 25        Estimated Creatinine Clearance: 134 mL/min (A) (by C-G formula based on SCr of 0.32 mg/dL (L)).  Results from last 7 days   Lab Units 04/13/21  0907 04/12/21  0642 04/11/21  1303   BUN mg/dL 10 14 12   CREATININE mg/dL 0.32* <0.47* <0.47*     Intake & Output (last 3 days)       04/10 0701 - 04/11 0700 04/11 0701 - 04/12 0700 04/12 0701 - 04/13 0700 04/13 0701 - 04/14 0700    P.O.  510 210     IV Piggyback   300     Total Intake(mL/kg)  510 (12.5) 510 (12.5)     Urine (mL/kg/hr)   200 (0.2)     Total Output   200     Net  +510 +310             Urine " "Unmeasured Occurrence  2 x           VANCOMYCIN DOSING SCHEDULE ( INCLUDING LEVELS)  4/12           1519           Vancomycin 1000mg IV (24.5mg/kg) load  4/13   0225                   Vancomycin 1000mg IV q12h  4/13           ~1600    Vancomycin 750mg ( 18.4mg/kg) iv q 8hours  4/14  0000  0800            4/14 0730 Vancomycin trough         Assessment/Plan:    Assessment:  Bayesian analysis of the most recent level(s) using [employer] provides the following patient-specific pharmacokinetic parameters:   CL: 4.93 L/h   Vd: 34 L   T1/2: 6.01 h  If the predicted trough on this regimen is not within what was previously considered a \"target trough range\", the AUC24 (a stronger predictor of vancomycin efficacy) is predicted to achieve the accepted target of 400-600 mg*h/L. To best balance efficacy and toxicity, we will be targeting AUC24 in this patient rather than steady-state trough levels.     Will change regimen 750 mg (18.4mg/kg) IV every 8 hours gives a predicted steady-state trough of 12.5 mg/L and AUC24 of 456 mg*h/L based upon population pharmacokinetics and this patient's specific parameters.    Recommendations/Plan:  -Change regimen to vancomycin 750 mg (18.4 mg/kg) IV every 8 hours   -Obtain vancomycin level prior to 0800 dose on 4/14/21 or sooner if acute changes   -Continue to monitor SCr    Thank you for involving pharmacy in this patient's care. Please contact pharmacy with any questions or concerns.                           Sadie Heaton [facility]  Clinical Pharmacist  04/13/21 10:24 EDT    "
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
## 2021-04-13 NOTE — PROGRESS NOTES
Per patient request and her permission I called the  and discussed the case with him and informed him about the diagnosis and plan of treatment and disposition: Answered all his questions

## 2021-04-13 NOTE — PLAN OF CARE
Goal Outcome Evaluation:  Plan of Care Reviewed With: patient  Progress: no change  Outcome Summary: VSS. Patient complains of pain frequently. Both IV and PO meds given. Xanax given for anxiety. Lost IV access, Midline ordered. Waiting for placement.
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
## 2021-04-14 LAB
ANION GAP SERPL CALCULATED.3IONS-SCNC: 8.9 MMOL/L (ref 5–15)
BACTERIA SPEC AEROBE CULT: ABNORMAL
BACTERIA SPEC AEROBE CULT: ABNORMAL
BASOPHILS # BLD AUTO: 0.01 10*3/MM3 (ref 0–0.2)
BASOPHILS NFR BLD AUTO: 0.1 % (ref 0–1.5)
BUN SERPL-MCNC: 6 MG/DL (ref 6–20)
BUN/CREAT SERPL: 22.2 (ref 7–25)
CALCIUM SPEC-SCNC: 8.4 MG/DL (ref 8.6–10.5)
CHLORIDE SERPL-SCNC: 99 MMOL/L (ref 98–107)
CO2 SERPL-SCNC: 25.1 MMOL/L (ref 22–29)
CORTIS SERPL-MCNC: 20.6 MCG/DL
CREAT SERPL-MCNC: 0.27 MG/DL (ref 0.57–1)
CRP SERPL-MCNC: 1.06 MG/DL (ref 0–0.5)
DEPRECATED RDW RBC AUTO: 39.9 FL (ref 37–54)
EOSINOPHIL # BLD AUTO: 0.01 10*3/MM3 (ref 0–0.4)
EOSINOPHIL NFR BLD AUTO: 0.1 % (ref 0.3–6.2)
ERYTHROCYTE [DISTWIDTH] IN BLOOD BY AUTOMATED COUNT: 12.5 % (ref 12.3–15.4)
GFR SERPL CREATININE-BSD FRML MDRD: >150 ML/MIN/1.73
GLUCOSE SERPL-MCNC: 80 MG/DL (ref 65–99)
GRAM STN SPEC: ABNORMAL
GRAM STN SPEC: ABNORMAL
HCT VFR BLD AUTO: 33.3 % (ref 34–46.6)
HGB BLD-MCNC: 10.9 G/DL (ref 12–15.9)
IMM GRANULOCYTES # BLD AUTO: 0.03 10*3/MM3 (ref 0–0.05)
IMM GRANULOCYTES NFR BLD AUTO: 0.4 % (ref 0–0.5)
LYMPHOCYTES # BLD AUTO: 1.51 10*3/MM3 (ref 0.7–3.1)
LYMPHOCYTES NFR BLD AUTO: 21.6 % (ref 19.6–45.3)
MAGNESIUM SERPL-MCNC: 2 MG/DL (ref 1.6–2.6)
MCH RBC QN AUTO: 28.6 PG (ref 26.6–33)
MCHC RBC AUTO-ENTMCNC: 32.7 G/DL (ref 31.5–35.7)
MCV RBC AUTO: 87.4 FL (ref 79–97)
MONOCYTES # BLD AUTO: 0.4 10*3/MM3 (ref 0.1–0.9)
MONOCYTES NFR BLD AUTO: 5.7 % (ref 5–12)
NEUTROPHILS NFR BLD AUTO: 5.02 10*3/MM3 (ref 1.7–7)
NEUTROPHILS NFR BLD AUTO: 72.1 % (ref 42.7–76)
NRBC BLD AUTO-RTO: 0 /100 WBC (ref 0–0.2)
OSMOLALITY SERPL: 266 MOSM/KG (ref 275–300)
PLATELET # BLD AUTO: 442 10*3/MM3 (ref 140–450)
PMV BLD AUTO: 8 FL (ref 6–12)
POTASSIUM SERPL-SCNC: 3.7 MMOL/L (ref 3.5–5.2)
RBC # BLD AUTO: 3.81 10*6/MM3 (ref 3.77–5.28)
RETICS # AUTO: 0.06 10*6/MM3 (ref 0.02–0.13)
RETICS/RBC NFR AUTO: 1.38 % (ref 0.7–1.9)
SODIUM SERPL-SCNC: 133 MMOL/L (ref 136–145)
VANCOMYCIN TROUGH SERPL-MCNC: 14.9 MCG/ML (ref 5–20)
WBC # BLD AUTO: 6.98 10*3/MM3 (ref 3.4–10.8)

## 2021-04-14 PROCEDURE — 82533 TOTAL CORTISOL: CPT | Performed by: INTERNAL MEDICINE

## 2021-04-14 PROCEDURE — 86140 C-REACTIVE PROTEIN: CPT | Performed by: INTERNAL MEDICINE

## 2021-04-14 PROCEDURE — 83735 ASSAY OF MAGNESIUM: CPT | Performed by: INTERNAL MEDICINE

## 2021-04-14 PROCEDURE — 25010000002 ONDANSETRON PER 1 MG: Performed by: HOSPITALIST

## 2021-04-14 PROCEDURE — 25010000002 ENOXAPARIN PER 10 MG: Performed by: INTERNAL MEDICINE

## 2021-04-14 PROCEDURE — 80048 BASIC METABOLIC PNL TOTAL CA: CPT | Performed by: NURSE PRACTITIONER

## 2021-04-14 PROCEDURE — 83930 ASSAY OF BLOOD OSMOLALITY: CPT | Performed by: INTERNAL MEDICINE

## 2021-04-14 PROCEDURE — 25010000002 HYDROMORPHONE PER 4 MG: Performed by: HOSPITALIST

## 2021-04-14 PROCEDURE — 80202 ASSAY OF VANCOMYCIN: CPT | Performed by: INTERNAL MEDICINE

## 2021-04-14 PROCEDURE — 85045 AUTOMATED RETICULOCYTE COUNT: CPT | Performed by: INTERNAL MEDICINE

## 2021-04-14 PROCEDURE — 85025 COMPLETE CBC W/AUTO DIFF WBC: CPT | Performed by: INTERNAL MEDICINE

## 2021-04-14 PROCEDURE — 25010000002 VANCOMYCIN 750 MG RECONSTITUTED SOLUTION: Performed by: INTERNAL MEDICINE

## 2021-04-14 RX ADMIN — SODIUM CHLORIDE 750 MG: 900 INJECTION, SOLUTION INTRAVENOUS at 17:27

## 2021-04-14 RX ADMIN — LORAZEPAM 1 MG: 1 TABLET ORAL at 16:28

## 2021-04-14 RX ADMIN — ENOXAPARIN SODIUM 40 MG: 100 INJECTION SUBCUTANEOUS at 14:23

## 2021-04-14 RX ADMIN — LEVETIRACETAM 1000 MG: 500 TABLET, FILM COATED ORAL at 08:27

## 2021-04-14 RX ADMIN — HYDROMORPHONE HYDROCHLORIDE 0.5 MG: 1 INJECTION, SOLUTION INTRAMUSCULAR; INTRAVENOUS; SUBCUTANEOUS at 04:36

## 2021-04-14 RX ADMIN — HYDROMORPHONE HYDROCHLORIDE 0.5 MG: 1 INJECTION, SOLUTION INTRAMUSCULAR; INTRAVENOUS; SUBCUTANEOUS at 08:28

## 2021-04-14 RX ADMIN — LORAZEPAM 1 MG: 1 TABLET ORAL at 10:04

## 2021-04-14 RX ADMIN — HYDROMORPHONE HYDROCHLORIDE 0.5 MG: 1 INJECTION, SOLUTION INTRAMUSCULAR; INTRAVENOUS; SUBCUTANEOUS at 01:42

## 2021-04-14 RX ADMIN — HYDROMORPHONE HYDROCHLORIDE 0.5 MG: 1 INJECTION, SOLUTION INTRAMUSCULAR; INTRAVENOUS; SUBCUTANEOUS at 06:19

## 2021-04-14 RX ADMIN — DULOXETINE HYDROCHLORIDE 30 MG: 30 CAPSULE, DELAYED RELEASE ORAL at 17:27

## 2021-04-14 RX ADMIN — SODIUM CHLORIDE, PRESERVATIVE FREE 10 ML: 5 INJECTION INTRAVENOUS at 08:27

## 2021-04-14 RX ADMIN — ONDANSETRON 4 MG: 2 INJECTION INTRAMUSCULAR; INTRAVENOUS at 06:23

## 2021-04-14 RX ADMIN — HYDROMORPHONE HYDROCHLORIDE 0.5 MG: 1 INJECTION, SOLUTION INTRAMUSCULAR; INTRAVENOUS; SUBCUTANEOUS at 10:44

## 2021-04-14 RX ADMIN — SODIUM CHLORIDE 750 MG: 900 INJECTION, SOLUTION INTRAVENOUS at 01:52

## 2021-04-14 RX ADMIN — HYDROMORPHONE HYDROCHLORIDE 0.5 MG: 1 INJECTION, SOLUTION INTRAMUSCULAR; INTRAVENOUS; SUBCUTANEOUS at 16:28

## 2021-04-14 RX ADMIN — LORAZEPAM 1 MG: 1 TABLET ORAL at 22:37

## 2021-04-14 RX ADMIN — LEVETIRACETAM 1000 MG: 500 TABLET, FILM COATED ORAL at 21:38

## 2021-04-14 RX ADMIN — LORAZEPAM 1 MG: 1 TABLET ORAL at 04:36

## 2021-04-14 RX ADMIN — SODIUM CHLORIDE, PRESERVATIVE FREE 10 ML: 5 INJECTION INTRAVENOUS at 21:38

## 2021-04-14 RX ADMIN — DAKIN'S SOLUTION 0.125% (QUARTER STRENGTH): 0.12 SOLUTION at 21:39

## 2021-04-14 RX ADMIN — HYDROMORPHONE HYDROCHLORIDE 0.5 MG: 1 INJECTION, SOLUTION INTRAMUSCULAR; INTRAVENOUS; SUBCUTANEOUS at 14:23

## 2021-04-14 RX ADMIN — HYDROMORPHONE HYDROCHLORIDE 0.5 MG: 1 INJECTION, SOLUTION INTRAMUSCULAR; INTRAVENOUS; SUBCUTANEOUS at 20:44

## 2021-04-14 RX ADMIN — SODIUM CHLORIDE 750 MG: 900 INJECTION, SOLUTION INTRAVENOUS at 10:04

## 2021-04-14 RX ADMIN — DAKIN'S SOLUTION 0.125% (QUARTER STRENGTH): 0.12 SOLUTION at 08:32
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
## 2021-04-14 NOTE — PLAN OF CARE
Goal Outcome Evaluation:  Plan of Care Reviewed With: patient  Progress: no change  Outcome Summary: Patient cooperative, but yells, screams, and demands medication frequently. After BM this morning, buttocks dressing changed W->D. PRN Dilaudid & Ativan given. Patient pulled out IV line this afternoon, so another has been started and is continuing to receive IV Vancomycin. Consult placed to Orthopedics for old fractures in left arm, will see tonight. Stool sample ordered for next BM. Plan for discharge to rehab facility possibly tomorrow.
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
## 2021-04-14 NOTE — PROGRESS NOTES
[facility]  Clinical Pharmacy Department     Vancomycin Pharmacokinetic Note    Mary Chavez is a 50 y.o. female who is on day #3 of pharmacy to dose vancomycin for SSTI.    Target level: AUC24 400-600  Consulting Provider:  Dr. Tobar  Current Vancomycin Dose: 750 mg (18.4 mg/kg) IV every 8  hours  Planned Duration of Therapy: 5  Other Antimicrobials: Zosyn EI    Allergies  Allergies as of 04/11/2021   • (No Known Allergies)       Microbiology:  Microbiology Results (last 10 days)     Procedure Component Value - Date/Time    MRSA Screen, PCR (Inpatient) - Swab, Nares [[id number]]  (Abnormal) Collected: 04/12/21 1417    Lab Status: Final result Specimen: Swab from Nares Updated: 04/12/21 1817     MRSA PCR MRSA Detected    Wound Culture - Wound, Buttock [[id number]]  (Abnormal)  (Susceptibility) Collected: 04/11/21 1951    Lab Status: Final result Specimen: Wound from Buttock Updated: 04/14/21 0633     Wound Culture Scant growth (1+) Staphylococcus aureus, MRSA     Comment: Methicillin resistant Staphylococcus aureus, Patient may be an isolation risk.         Scant growth (1+) Normal Skin Mignon     Gram Stain Few (2+) WBCs seen      Rare (1+) Gram positive cocci    Susceptibility      Staphylococcus aureus, MRSA      JO      Clindamycin Susceptible      Erythromycin Resistant      Inducible Clindamycin Resistance Negative      Oxacillin Resistant      Penicillin G Resistant      Rifampin Susceptible      Tetracycline Susceptible      Trimethoprim + Sulfamethoxazole Susceptible      Vancomycin Susceptible               Linear View               Susceptibility Comments     Staphylococcus aureus, MRSA    This isolate does not demonstrate inducible clindamycin resistance in vitro.               Blood Culture - Blood, Arm, Left [[id number]] Collected: 04/11/21 1536    Lab Status: Preliminary result Specimen: Blood from Arm, Left Updated: 04/13/21 1545     Blood Culture No growth at 2 days    COVID PRE-OP /  "PRE-PROCEDURE SCREENING ORDER (NO ISOLATION) - Swab, Nasopharynx [[id number]]  (Normal) Collected: 04/11/21 1305    Lab Status: Final result Specimen: Swab from Nasopharynx Updated: 04/11/21 1733    Narrative:      The following orders were created for panel order COVID PRE-OP / PRE-PROCEDURE SCREENING ORDER (NO ISOLATION) - Swab, Nasopharynx.  Procedure                               Abnormality         Status                     ---------                               -----------         ------                     COVID-19,APTIMA PANTHER,...[[id number]]  Normal              Final result                 Please view results for these tests on the individual orders.    COVID-19,APTIMA PANTHER,[facility] IN-HOUSE, NP/OP SWAB IN UTM/VTM/[facility]ALINE TRANSPORT MEDIA,24 HR TAT - Swab, Nasopharynx [[id number]]  (Normal) Collected: 04/11/21 1305    Lab Status: Final result Specimen: Swab from Nasopharynx Updated: 04/11/21 1733     COVID19 Not Detected    Narrative:      Fact sheet for providers: https://www.fda.gov/media/390149/download     Fact sheet for patients: https://www.fda.gov/media/575033/download    Test performed by RT PCR.    Blood Culture - Blood, Arm, Right [[id number]] Collected: 04/11/21 1303    Lab Status: Preliminary result Specimen: Blood from Arm, Right Updated: 04/13/21 1315     Blood Culture No growth at 2 days             Vitals/Labs/I&O  167.6 cm (66\")  40.8 kg (90 lb)  Body mass index is 14.53 kg/m².   Temp:  [97.3 °F (36.3 °C)-97.7 °F (36.5 °C)] 97.4 °F (36.3 °C)  Heart Rate:  [] 89  Resp:  [16] 16  BP: (110-122)/(60-74) 114/68    Results from last 7 days   Lab Units 04/13/21  0907 04/12/21  0642 04/11/21  1303   WBC 10*3/mm3 7.94 8.40 8.60     Results from last 7 days   Lab Units 04/11/21  1303   LACTATE mmol/L 1.5      Results from last 7 days   Lab Units 04/11/21  1303   PROCALCITONIN ng/mL 0.06     Results from last 7 days   Lab Units 04/14/21  0722   CRP mg/dL 1.06*       Results from last 7 days   Lab " "Units 04/12/21  2244   SED RATE mm/hr 25        Estimated Creatinine Clearance: 158.8 mL/min (A) (by C-G formula based on SCr of 0.27 mg/dL (L)).  Results from last 7 days   Lab Units 04/14/21  0722 04/13/21  0907 04/12/21  0642   BUN mg/dL 6 10 14   CREATININE mg/dL 0.27* 0.32* <0.47*     Intake & Output (last 3 days)       04/11 0701 - 04/12 0700 04/12 0701 - 04/13 0700 04/13 0701 - 04/14 0700 04/14 0701 - 04/15 0700    P.O. 510 210 600     IV Piggyback  300  250    Total Intake(mL/kg) 510 (12.5) 510 (12.5) 600 (14.7) 250 (6.1)    Urine (mL/kg/hr)  200 (0.2)      Total Output  200      Net +510 +310 +600 +250            Urine Unmeasured Occurrence 2 x  4 x     Stool Unmeasured Occurrence   3 x          VANCOMYCIN DOSING SCHEDULE ( INCLUDING LEVELS)  4/12           1519           Vancomycin 1000mg IV (24.5mg/kg) load  4/13   0225                   Vancomycin 1000mg IV q12h  4/13           ~1542    Vancomycin 750mg ( 18.4mg/kg) iv q 8hours  4/14  0152              4/14 0722       Vancomycin trough = 14.9 mcg/ml (early 5.5hrs)      Assessment/Plan:    Assessment:  Bayesian analysis of the most recent level(s) using [employer] provides the following patient-specific pharmacokinetic parameters:   CL: 4.36 L/h   Vd: 35.7 L   T1/2: 8.35 h  If the predicted trough on this regimen is not within what was previously considered a \"target trough range\", the AUC24 (a stronger predictor of vancomycin efficacy) is predicted to achieve the accepted target of 400-600 mg*h/L. To best balance efficacy and toxicity, we will be targeting AUC24 in this patient rather than steady-state trough levels.     Will continue regimen 750 mg (18.4mg/kg) IV every 8 hours gives a predicted steady-state trough of 15 mg/L and AUC24 of 515 mg*h/L based upon population pharmacokinetics and this patient's specific parameters.    Recommendations/Plan:  -Continue regimen of vancomycin 750 mg (18.4 mg/kg) IV every 8 hours   -Vancomycin level therapeutic " today ( although drawn early due to difficulty with timing issues and patient removing iv site/ obtain level in 48 hours unless acute renal changes  -Continue to monitor SCr    Thank you for involving pharmacy in this patient's care. Please contact pharmacy with any questions or concerns.                           Sadie Heaton [facility]  Clinical Pharmacist  04/14/21 11:02 EDT

## 2021-04-14 NOTE — PLAN OF CARE
Problem: Adult Inpatient Plan of Care  Goal: Plan of Care Review  Outcome: Ongoing, Progressing  Flowsheets (Taken 4/14/2021 0409)  Plan of Care Reviewed With: patient  Outcome Summary: Pt has no Iv line, seen by for IV nurse for a midline placement but no possible vessel seen, got a peripheral site for IV Dilaudid and IV antibiotics, c/o frequent pain, gluteal area and Rt arm, dresing changed, demanding and rude at times, yells and screams, forgetful and gets confused at times   Goal Outcome Evaluation:  Plan of Care Reviewed With: patient     Outcome Summary: Pt has no Iv line, seen by for IV nurse for a midline placement but no possible vessel seen, got a peripheral site for IV Dilaudid and IV antibiotics, c/o frequent pain, gluteal area and Rt arm, dresing changed, demanding and rude at times, yells and screams, forgetful and gets confused at times

## 2021-04-14 NOTE — PROGRESS NOTES
"    Name: Mary Chavez ADMIT: 2021   : 1970  PCP: Murphy Carty MD    MRN: [id number] LOS: 1 days   AGE/SEX: 50 y.o. female  ROOM: [facility]     Subjective   Subjective   C/O buttock pain and RUE pain. Asking specifically for \"dilaudid and ativan\". Restless. Denies fever, dyspnea, chest pain. + BM    Review of Systems   Constitutional: Negative for fever.   HENT: Negative for congestion.    Respiratory: Negative for shortness of breath.    Cardiovascular: Negative for chest pain.   Gastrointestinal: Negative for constipation.   Genitourinary: Negative for difficulty urinating.   Musculoskeletal:        Buttock pain from wound and RUE pain   Skin: Positive for wound.   Neurological: Negative for headaches.   Psychiatric/Behavioral: The patient is nervous/anxious.         Objective   Objective   Vital Signs  Temp:  [97.3 °F (36.3 °C)-97.7 °F (36.5 °C)] 97.4 °F (36.3 °C)  Heart Rate:  [] 89  Resp:  [16] 16  BP: (110-122)/(60-74) 114/68  SpO2:  [98 %-100 %] 98 %  on   ;   Device (Oxygen Therapy): room air  Body mass index is 14.53 kg/m².  Physical Exam  Vitals and nursing note reviewed.   Constitutional:       General: She is not in acute distress.     Appearance: She is cachectic. She is ill-appearing.   HENT:      Mouth/Throat:      Mouth: Mucous membranes are dry.      Comments: Poor dentition  Eyes:      Conjunctiva/sclera: Conjunctivae normal.   Cardiovascular:      Rate and Rhythm: Normal rate and regular rhythm.   Pulmonary:      Effort: Pulmonary effort is normal. No respiratory distress.      Breath sounds: Normal breath sounds.   Abdominal:      General: Bowel sounds are normal. There is no distension.      Palpations: Abdomen is soft.   Musculoskeletal:      Cervical back: Neck supple.      Right lower leg: No edema.      Left lower leg: No edema.      Comments: Limited active ROM RUE   Skin:     General: Skin is warm and dry.   Neurological:      Mental Status: She is alert.      " Comments: Oriented w/intermittent confusion   Psychiatric:         Behavior: Behavior is cooperative.      Comments: Restless  Mild anxiety         Results Review     I reviewed the patient's new clinical results.  Results from last 7 days   Lab Units 04/13/21  0907 04/12/21  0642 04/11/21  1303   WBC 10*3/mm3 7.94 8.40 8.60   HEMOGLOBIN g/dL 13.1 12.5 13.0   PLATELETS 10*3/mm3 542* 492* 394     Results from last 7 days   Lab Units 04/14/21  0722 04/13/21  0907 04/12/21  0642 04/11/21  1303   SODIUM mmol/L 133* 126* 134* 131*   POTASSIUM mmol/L 3.7 3.2* 4.0 3.5   CHLORIDE mmol/L 99 91* 97* 94*   CO2 mmol/L 25.1 22.2 29.5* 29.6*   BUN mg/dL 6 10 14 12   CREATININE mg/dL 0.27* 0.32* <0.47* <0.47*   GLUCOSE mg/dL 80 229* 88 84   Estimated Creatinine Clearance: 158.8 mL/min (A) (by C-G formula based on SCr of 0.27 mg/dL (L)).  Results from last 7 days   Lab Units 04/13/21  0907 04/11/21  1303   ALBUMIN g/dL 2.60* 2.50*   BILIRUBIN mg/dL 0.3 0.4   ALK PHOS U/L 183* 193*   AST (SGOT) U/L 12 15   ALT (SGPT) U/L 14 12     Results from last 7 days   Lab Units 04/14/21  0722 04/13/21  0907 04/12/21  0642 04/11/21  1303   CALCIUM mg/dL 8.4* 8.3* 8.1* 8.4*   ALBUMIN g/dL  --  2.60*  --  2.50*   MAGNESIUM mg/dL 2.0  --   --  1.9     Results from last 7 days   Lab Units 04/11/21  1303   PROCALCITONIN ng/mL 0.06   LACTATE mmol/L 1.5     COVID19   Date Value Ref Range Status   04/11/2021 Not Detected Not Detected - Ref. Range Final   01/17/2021 Not Detected Not Detected - Ref. Range Final     No results found for: HGBA1C, POCGLU    XR Humerus Right  Narrative: XR HUMERUS RIGHT-  04/13/2021     HISTORY: Follow-up fracture.     There is moderately severe displacement with overriding of the fracture  fragments of the proximal humerus. Humeral head appears fractured but  appears to articulate with the glenoid portion of the scapula.     There is large amount of callus formation/heterotopic ossification  surrounding the proximal  humerus. No other fractures are seen.     The humerus fracture was also seen on the 01/17/2021 study. No new  fractures are seen. There are several old right rib fractures.     Impression: Impacted or moderately severely displaced fracture of the  proximal humerus with override of the fracture fragments as described.  2. There is a large amount of callus formation/heterotopic ossification  in the proximal humerus.     This report was finalized on 4/13/2021 4:33 PM by Dr. Jhonathan Rendon M.D.       Scheduled Medications  DULoxetine, 30 mg, Oral, Daily  enoxaparin, 40 mg, Subcutaneous, Q24H  levETIRAcetam, 1,000 mg, Oral, BID  sodium chloride, 10 mL, Intravenous, Q12H  sodium hypochlorite, , Topical, BID  vancomycin, 750 mg, Intravenous, Q8H    Infusions  Pharmacy to dose vancomycin,     Diet  Diet Soft Texture; Chopped       Assessment/Plan     Active Hospital Problems    Diagnosis  POA   • **Pressure injury of buttock, unstageable (CMS/HCC) [L89.300]  Yes   • Hypokalemia [E87.6]  No   • History of seizure disorder [Z86.69]  Not Applicable   • Severe malnutrition (CMS/HCC) [E43]  Yes   • Mobility impaired [Z74.09]  Yes   • Hyponatremia [E87.1]  Yes   • Thrombocytosis (CMS/HCC) [D47.3]  Yes   • Cachexia (CMS/HCC) [R64]  Yes   • Polysubstance abuse (CMS/HCC) [F19.10]  Yes   • Chronic pain syndrome [G89.4]  Yes   • Seizures (CMS/HCC) [R56.9]  Yes      Resolved Hospital Problems   No resolved problems to display.       50 y.o. female admitted with Pressure injury of buttock, unstageable (CMS/HCC).    -Plastic surgery has evaluated. Continue LWC w/Dakins, pressure relief/offloading, low air loss mattress, adequate nutrition. Will need ongoing follow up in Wound Care clinic of pt's choice.  -Wound culture + MRSA, susceptible to Vanc. Also + MRSA nares. Contact isolation.  ID now following.  Blood cultures NGTD. ESR wnl. Pelvic & sacral xrays reviewed with Dr. Tobar; no bony destruction. CRP 1.06. May need MRI for  further evaluation of deep infection  -Pain difficult to control. Continue dilaudid & Norco  -Lorazepam prn for anxiety/withdrawal  -Repeat BMP normal K, Na improved 133  -Restart keppra for seizure hx  -Plts wnl  -Rt humerus fx: known from hospitalization in January. Repeat xray shows development of H.O. and continued fx of prox humerus. Ask Ortho to review  -Restarted home med Cymbalta      · Lovenox 40 mg SC daily for DVT prophylaxis.  · Full code.  · Discussed with patient, nursing staff and Dr. Tobar.  · Anticipate discharge SNF, likely tomorrow     KACIE Swift  [facility]  04/14/21  11:37 EDT

## 2021-04-15 VITALS
DIASTOLIC BLOOD PRESSURE: 82 MMHG | WEIGHT: 90 LBS | HEART RATE: 96 BPM | RESPIRATION RATE: 16 BRPM | BODY MASS INDEX: 14.46 KG/M2 | OXYGEN SATURATION: 99 % | TEMPERATURE: 97.1 F | HEIGHT: 66 IN | SYSTOLIC BLOOD PRESSURE: 108 MMHG

## 2021-04-15 LAB
ANION GAP SERPL CALCULATED.3IONS-SCNC: 10.2 MMOL/L (ref 5–15)
BASOPHILS # BLD AUTO: 0.03 10*3/MM3 (ref 0–0.2)
BASOPHILS NFR BLD AUTO: 0.5 % (ref 0–1.5)
BUN SERPL-MCNC: 6 MG/DL (ref 6–20)
BUN/CREAT SERPL: 26.1 (ref 7–25)
CALCIUM SPEC-SCNC: 8.3 MG/DL (ref 8.6–10.5)
CHLORIDE SERPL-SCNC: 101 MMOL/L (ref 98–107)
CO2 SERPL-SCNC: 22.8 MMOL/L (ref 22–29)
CREAT SERPL-MCNC: 0.23 MG/DL (ref 0.57–1)
DEPRECATED RDW RBC AUTO: 39 FL (ref 37–54)
EOSINOPHIL # BLD AUTO: 0.02 10*3/MM3 (ref 0–0.4)
EOSINOPHIL NFR BLD AUTO: 0.3 % (ref 0.3–6.2)
ERYTHROCYTE [DISTWIDTH] IN BLOOD BY AUTOMATED COUNT: 12.4 % (ref 12.3–15.4)
FERRITIN SERPL-MCNC: 729 NG/ML (ref 13–150)
FOLATE SERPL-MCNC: 4.23 NG/ML (ref 4.78–24.2)
GFR SERPL CREATININE-BSD FRML MDRD: >150 ML/MIN/1.73
GLUCOSE SERPL-MCNC: 81 MG/DL (ref 65–99)
HCT VFR BLD AUTO: 34.7 % (ref 34–46.6)
HGB BLD-MCNC: 11.4 G/DL (ref 12–15.9)
IMM GRANULOCYTES # BLD AUTO: 0.02 10*3/MM3 (ref 0–0.05)
IMM GRANULOCYTES NFR BLD AUTO: 0.3 % (ref 0–0.5)
IRON 24H UR-MRATE: 37 MCG/DL (ref 37–145)
IRON SATN MFR SERPL: 21 % (ref 20–50)
LYMPHOCYTES # BLD AUTO: 1.53 10*3/MM3 (ref 0.7–3.1)
LYMPHOCYTES NFR BLD AUTO: 24.1 % (ref 19.6–45.3)
MCH RBC QN AUTO: 28.2 PG (ref 26.6–33)
MCHC RBC AUTO-ENTMCNC: 32.9 G/DL (ref 31.5–35.7)
MCV RBC AUTO: 85.9 FL (ref 79–97)
MONOCYTES # BLD AUTO: 0.44 10*3/MM3 (ref 0.1–0.9)
MONOCYTES NFR BLD AUTO: 6.9 % (ref 5–12)
NEUTROPHILS NFR BLD AUTO: 4.31 10*3/MM3 (ref 1.7–7)
NEUTROPHILS NFR BLD AUTO: 67.9 % (ref 42.7–76)
NRBC BLD AUTO-RTO: 0 /100 WBC (ref 0–0.2)
PLATELET # BLD AUTO: 360 10*3/MM3 (ref 140–450)
PMV BLD AUTO: 8.2 FL (ref 6–12)
POTASSIUM SERPL-SCNC: 3.6 MMOL/L (ref 3.5–5.2)
RBC # BLD AUTO: 4.04 10*6/MM3 (ref 3.77–5.28)
SODIUM SERPL-SCNC: 134 MMOL/L (ref 136–145)
TIBC SERPL-MCNC: 177 MCG/DL (ref 298–536)
TRANSFERRIN SERPL-MCNC: 119 MG/DL (ref 200–360)
VIT B12 BLD-MCNC: 721 PG/ML (ref 211–946)
WBC # BLD AUTO: 6.35 10*3/MM3 (ref 3.4–10.8)

## 2021-04-15 PROCEDURE — 25010000002 VANCOMYCIN 750 MG RECONSTITUTED SOLUTION: Performed by: INTERNAL MEDICINE

## 2021-04-15 PROCEDURE — 25010000002 ENOXAPARIN PER 10 MG: Performed by: INTERNAL MEDICINE

## 2021-04-15 PROCEDURE — 82728 ASSAY OF FERRITIN: CPT | Performed by: INTERNAL MEDICINE

## 2021-04-15 PROCEDURE — 84466 ASSAY OF TRANSFERRIN: CPT | Performed by: INTERNAL MEDICINE

## 2021-04-15 PROCEDURE — 97110 THERAPEUTIC EXERCISES: CPT

## 2021-04-15 PROCEDURE — 82746 ASSAY OF FOLIC ACID SERUM: CPT | Performed by: INTERNAL MEDICINE

## 2021-04-15 PROCEDURE — 82607 VITAMIN B-12: CPT | Performed by: INTERNAL MEDICINE

## 2021-04-15 PROCEDURE — 85025 COMPLETE CBC W/AUTO DIFF WBC: CPT | Performed by: INTERNAL MEDICINE

## 2021-04-15 PROCEDURE — 83540 ASSAY OF IRON: CPT | Performed by: INTERNAL MEDICINE

## 2021-04-15 PROCEDURE — 80048 BASIC METABOLIC PNL TOTAL CA: CPT | Performed by: NURSE PRACTITIONER

## 2021-04-15 RX ORDER — DOXYCYCLINE 100 MG/1
100 CAPSULE ORAL EVERY 12 HOURS SCHEDULED
Status: DISCONTINUED | OUTPATIENT
Start: 2021-04-15 | End: 2021-04-15 | Stop reason: HOSPADM

## 2021-04-15 RX ORDER — HYDROCODONE BITARTRATE AND ACETAMINOPHEN 5; 325 MG/1; MG/1
1 TABLET ORAL EVERY 4 HOURS PRN
Qty: 12 TABLET | Refills: 0 | Status: SHIPPED | OUTPATIENT
Start: 2021-04-15 | End: 2021-04-18

## 2021-04-15 RX ORDER — FOLIC ACID 1 MG/1
1 TABLET ORAL DAILY
Qty: 30 TABLET | Refills: 3 | Status: SHIPPED | OUTPATIENT
Start: 2021-04-15

## 2021-04-15 RX ORDER — SODIUM HYPOCHLORITE 1.25 MG/ML
SOLUTION TOPICAL
Start: 2021-04-15

## 2021-04-15 RX ORDER — DOXYCYCLINE 100 MG/1
100 CAPSULE ORAL EVERY 12 HOURS SCHEDULED
Qty: 28 CAPSULE | Refills: 0 | Status: SHIPPED | OUTPATIENT
Start: 2021-04-15 | End: 2021-04-15

## 2021-04-15 RX ORDER — AMITRIPTYLINE HYDROCHLORIDE 50 MG/1
150 TABLET, FILM COATED ORAL ONCE
Status: COMPLETED | OUTPATIENT
Start: 2021-04-15 | End: 2021-04-15

## 2021-04-15 RX ORDER — DOXYCYCLINE 100 MG/1
100 CAPSULE ORAL EVERY 12 HOURS SCHEDULED
Qty: 28 CAPSULE | Refills: 0
Start: 2021-04-15 | End: 2021-04-29

## 2021-04-15 RX ORDER — ACETAMINOPHEN 325 MG/1
650 TABLET ORAL EVERY 4 HOURS PRN
Start: 2021-04-15

## 2021-04-15 RX ADMIN — DOXYCYCLINE 100 MG: 100 CAPSULE ORAL at 10:48

## 2021-04-15 RX ADMIN — LEVETIRACETAM 1000 MG: 500 TABLET, FILM COATED ORAL at 08:54

## 2021-04-15 RX ADMIN — LORAZEPAM 1 MG: 1 TABLET ORAL at 08:54

## 2021-04-15 RX ADMIN — SODIUM CHLORIDE 750 MG: 900 INJECTION, SOLUTION INTRAVENOUS at 01:47

## 2021-04-15 RX ADMIN — AMITRIPTYLINE HYDROCHLORIDE 150 MG: 50 TABLET, FILM COATED ORAL at 02:42

## 2021-04-15 RX ADMIN — DAKIN'S SOLUTION 0.125% (QUARTER STRENGTH): 0.12 SOLUTION at 10:49

## 2021-04-15 RX ADMIN — SODIUM CHLORIDE, PRESERVATIVE FREE 10 ML: 5 INJECTION INTRAVENOUS at 08:54

## 2021-04-15 RX ADMIN — HYDROCODONE BITARTRATE AND ACETAMINOPHEN 1 TABLET: 5; 325 TABLET ORAL at 10:48

## 2021-04-15 RX ADMIN — HYDROCODONE BITARTRATE AND ACETAMINOPHEN 1 TABLET: 5; 325 TABLET ORAL at 04:32

## 2021-04-15 RX ADMIN — DAKIN'S SOLUTION 0.125% (QUARTER STRENGTH): 0.12 SOLUTION at 08:58

## 2021-04-15 RX ADMIN — DULOXETINE HYDROCHLORIDE 30 MG: 30 CAPSULE, DELAYED RELEASE ORAL at 08:54

## 2021-04-15 RX ADMIN — ENOXAPARIN SODIUM 40 MG: 100 INJECTION SUBCUTANEOUS at 14:56

## 2021-04-15 NOTE — PLAN OF CARE
Goal Outcome Evaluation:pt with MRSA, pressure ulcers, to be d/cd to LTC,rehab this afternoon,  pictures taken, & dressing changed , started on oral anitbx, will transfer per stretcher van,

## 2021-04-15 NOTE — PROGRESS NOTES
"  Infectious Diseases Progress Note    Adin Frederick MD     [facility]  Los: 2 days  Patient Identification:  Name: Mary Chavez  Age: 50 y.o.  Sex: female  :  1970  MRN: [id number]         Primary Care Physician: Murphy Carty MD            Subjective: Overall feeling better still have pain.  Interval History: See consultation note.    Objective:    Scheduled Meds:DULoxetine, 30 mg, Oral, Daily  enoxaparin, 40 mg, Subcutaneous, Q24H  levETIRAcetam, 1,000 mg, Oral, BID  sodium chloride, 10 mL, Intravenous, Q12H  sodium hypochlorite, , Topical, BID  vancomycin, 750 mg, Intravenous, Q8H      Continuous Infusions:Pharmacy to dose vancomycin,         Vital signs in last 24 hours:  Temp:  [96.9 °F (36.1 °C)-97.4 °F (36.3 °C)] 97.1 °F (36.2 °C)  Heart Rate:  [83-89] 87  Resp:  [16] 16  BP: (114-128)/(64-78) 122/78    Intake/Output:    Intake/Output Summary (Last 24 hours) at 4/15/2021 0722  Last data filed at 4/15/2021 0434  Gross per 24 hour   Intake 620 ml   Output --   Net 620 ml       Exam:  /78 (BP Location: Left arm, Patient Position: Lying)   Pulse 87   Temp 97.1 °F (36.2 °C) (Oral)   Resp 16   Ht 167.6 cm (66\")   Wt 40.8 kg (90 lb)   SpO2 98%   BMI 14.53 kg/m²   Patient is examined using the personal protective equipment as per guidelines from infection control for this particular patient as enacted.  Hand washing was performed before and after patient interaction.  General Appearance:  Comfortable appearing female who is emaciated and in no acute distress                          Head:    Normocephalic, without obvious abnormality, atraumatic                           Eyes:    PERRL, conjunctivae/corneas clear, EOM's intact, both eyes                         Throat:   Lips, tongue, gums normal; oral mucosa pink and moist                           Neck:   Supple, symmetrical, trachea midline, no JVD                         Lungs:    Clear to auscultation bilaterally, " respirations unlabored                 Chest Wall:    No tenderness or deformity                          Heart:    Regular rate and rhythm, S1 and S2 normal, no murmur                  Abdomen:   Soft scaphoid.                 Extremities: Contractures with sores noted                        Pulses:   Pulses palpable in all extremities                            Skin:                                     Neurologic: Contracture of the lower extremities noted       Data Review:    I reviewed the patient's new clinical results.  Results from last 7 days   Lab Units 04/14/21  1143 04/13/21  0907 04/12/21  0642 04/11/21  1303   WBC 10*3/mm3 6.98 7.94 8.40 8.60   HEMOGLOBIN g/dL 10.9* 13.1 12.5 13.0   PLATELETS 10*3/mm3 442 542* 492* 394     Results from last 7 days   Lab Units 04/14/21  0722 04/13/21  0907 04/12/21  0642 04/11/21  1303   SODIUM mmol/L 133* 126* 134* 131*   POTASSIUM mmol/L 3.7 3.2* 4.0 3.5   CHLORIDE mmol/L 99 91* 97* 94*   CO2 mmol/L 25.1 22.2 29.5* 29.6*   BUN mg/dL 6 10 14 12   CREATININE mg/dL 0.27* 0.32* <0.47* <0.47*   CALCIUM mg/dL 8.4* 8.3* 8.1* 8.4*   GLUCOSE mg/dL 80 229* 88 84     Microbiology Results (last 10 days)     Procedure Component Value - Date/Time    MRSA Screen, PCR (Inpatient) - Swab, Nares [[id number]]  (Abnormal) Collected: 04/12/21 1417    Lab Status: Final result Specimen: Swab from Nares Updated: 04/12/21 1817     MRSA PCR MRSA Detected    Wound Culture - Wound, Buttock [[id number]]  (Abnormal)  (Susceptibility) Collected: 04/11/21 1951    Lab Status: Final result Specimen: Wound from Buttock Updated: 04/14/21 0633     Wound Culture Scant growth (1+) Staphylococcus aureus, MRSA     Comment: Methicillin resistant Staphylococcus aureus, Patient may be an isolation risk.         Scant growth (1+) Normal Skin Mignon     Gram Stain Few (2+) WBCs seen      Rare (1+) Gram positive cocci    Susceptibility      Staphylococcus aureus, MRSA      JO      Clindamycin Susceptible       Erythromycin Resistant      Inducible Clindamycin Resistance Negative      Oxacillin Resistant      Penicillin G Resistant      Rifampin Susceptible      Tetracycline Susceptible      Trimethoprim + Sulfamethoxazole Susceptible      Vancomycin Susceptible               Linear View               Susceptibility Comments     Staphylococcus aureus, MRSA    This isolate does not demonstrate inducible clindamycin resistance in vitro.               Blood Culture - Blood, Arm, Left [[id number]] Collected: 04/11/21 1536    Lab Status: Preliminary result Specimen: Blood from Arm, Left Updated: 04/14/21 1545     Blood Culture No growth at 3 days    COVID PRE-OP / PRE-PROCEDURE SCREENING ORDER (NO ISOLATION) - Swab, Nasopharynx [[id number]]  (Normal) Collected: 04/11/21 1305    Lab Status: Final result Specimen: Swab from Nasopharynx Updated: 04/11/21 1733    Narrative:      The following orders were created for panel order COVID PRE-OP / PRE-PROCEDURE SCREENING ORDER (NO ISOLATION) - Swab, Nasopharynx.  Procedure                               Abnormality         Status                     ---------                               -----------         ------                     COVID-19,APTIMA PANTHER,...[[id number]]  Normal              Final result                 Please view results for these tests on the individual orders.    COVID-19,APTIMA PANTHER,[facility] IN-HOUSE, NP/OP SWAB IN UTM/VTM/[facility] TRANSPORT MEDIA,24 HR TAT - Swab, Nasopharynx [[id number]]  (Normal) Collected: 04/11/21 1305    Lab Status: Final result Specimen: Swab from Nasopharynx Updated: 04/11/21 1733     COVID19 Not Detected    Narrative:      Fact sheet for providers: https://www.fda.gov/media/663316/download     Fact sheet for patients: https://www.fda.gov/media/383502/download    Test performed by RT PCR.    Blood Culture - Blood, Arm, Right [[id number]] Collected: 04/11/21 1303    Lab Status: Preliminary result Specimen: Blood from Arm, Right Updated: 04/14/21  1315     Blood Culture No growth at 3 days            Assessment:    Pressure injury of buttock, unstageable (CMS/HCC)    Seizures (CMS/HCC)    Chronic pain syndrome    Polysubstance abuse (CMS/HCC)    Cachexia (CMS/HCC)    Severe malnutrition (CMS/HCC)    Mobility impaired    Hyponatremia    Thrombocytosis (CMS/HCC)    Hypokalemia    History of seizure disorder  1-MRSA infection/colonization of the stage IV decubitus ulcer with her degree of pain is concerning for contiguous focus osteomyelitis especially in the inflammatory marker such as CRP is elevated.  Agree with continuation of vancomycin for now but because of her severe pain of fairly short duration and sacrococcygeal area contiguous focus osteomyelitis need to be ruled out.  We will check inflammatory markers such as CRP and if it is elevated we will discuss with primary internal medicine service about considering further imaging studies if technically possible to rule out contiguous focus osteomyelitis.  The degree of pain that she is exhibiting in her sacrococcygeal area makes one concerned about contiguous focus osteomyelitis which if found will change how she will be treated in terms of duration of antibiotics.  2-polysubstance abuse  3-chronic pain syndrome  4-history of seizure disorder  5-mobility disorder  6-severe malnourishment and cachexia  7-significant psychosocial situation including homelessness.     Recommendations/Discussions:  Switch IV vancomycin to oral doxycycline.  As discussed with Dr. Quintero patient could be discharged on couple of weeks of oral doxycycline with aggressive wound care and prevention of decubiti.  Adin Frederick MD  4/15/2021  07:22 EDT    Much of this encounter note is an electronic transcription/translation of spoken language to printed text. The electronic translation of spoken language may permit erroneous, or at times, nonsensical words or phrases to be inadvertently transcribed; Although I have reviewed the note  for such errors, some may still exist

## 2021-04-15 NOTE — DISCHARGE SUMMARY
Patient Name: Mary Chavez  : 1970  MRN: [id number]    Date of Admission: 2021  Date of Discharge:  4/15/2021  Primary Care Physician: Murphy Carty MD      Chief Complaint:   Multiple complaints      Discharge Diagnoses     Active Hospital Problems    Diagnosis  POA   • **Pressure injury of buttock, unstageable (CMS/HCC) [L89.300]  Yes   • Hypokalemia [E87.6]  No   • History of seizure disorder [Z86.69]  Not Applicable   • Severe malnutrition (CMS/HCC) [E43]  Yes   • Mobility impaired [Z74.09]  Yes   • Hyponatremia [E87.1]  Yes   • Thrombocytosis (CMS/HCC) [D47.3]  Yes   • Cachexia (CMS/HCC) [R64]  Yes   • Folate deficiency [E53.8]  Yes   • Polysubstance abuse (CMS/HCC) [F19.10]  Yes   • Chronic pain syndrome [G89.4]  Yes   • Seizures (CMS/HCC) [R56.9]  Yes      Resolved Hospital Problems   No resolved problems to display.   Folate deficiency anemia      Hospital Course     Ms. Chavez is a 50 y.o. female with a history of depression, seizures, rt humerus fx and polysubstance abuse who presented to [facility] initially complaining of severe buttock pain.  Please see the admitting history and physical for further details.  She was found to have large decubitus ulcer and was admitted to the hospital for further evaluation and treatment.  She was started on broad spectrum antibiotics. Plastic surgery evaluated sacral wound as well as rt olecranon wounds and recommended continued wound care with Dakins,  pressure relief/offloading, low air loss mattress, adequate nutrition. There was no indication for surgical debridement. Wound cultures and Nare swab grew MRSA. ID has followed and recommends 14 more days of po doxycycline. Blood cultures have remained negative. Repeat rt humerus xrays were obtained for known fx sustained 2021. Ortho evaluated and recommends pt to be WBAT, continue to perform ROM and strengthening exercises. She was not a candidate for surgical repair due to  current MRSA infection. Access evaluated for substance abuse issues but pt was not interested in consult. Labs are stable w/exception of mild hyponatremia and anemia. She was on fluid restriction for hyponatremia but this has improved. Medically she is stable for discharge to SNF.  Day of Discharge     Subjective:  Continued pain in buttocks and RUE. Appetite fair. Afebrile. Denies dyspnea, chest pain, nausea.    Physical Exam:  Temp:  [96.9 °F (36.1 °C)-97.1 °F (36.2 °C)] 97.1 °F (36.2 °C)  Heart Rate:  [83-96] 96  Resp:  [16] 16  BP: (122-128)/(64-78) 122/78  Body mass index is 14.53 kg/m².  Physical Exam  Vitals and nursing note reviewed.   Constitutional:       General: She is not in acute distress.     Appearance: She is cachectic. She is ill-appearing.   HENT:      Head: Normocephalic.      Mouth/Throat:      Mouth: Mucous membranes are moist.      Comments: Poor dentition  Eyes:      Conjunctiva/sclera: Conjunctivae normal.   Cardiovascular:      Rate and Rhythm: Normal rate and regular rhythm.   Pulmonary:      Effort: Pulmonary effort is normal. No respiratory distress.      Breath sounds: Normal breath sounds.   Abdominal:      General: Bowel sounds are normal.      Palpations: Abdomen is soft.   Musculoskeletal:      Cervical back: Neck supple.      Right lower leg: No edema.      Left lower leg: No edema.   Skin:     General: Skin is warm and dry.      Comments: Wounds not assessed   Neurological:      Mental Status: She is alert and oriented to person, place, and time.      Comments: Mild confusion at times   Psychiatric:         Mood and Affect: Mood normal.         Behavior: Behavior normal.         Consultants     Consult Orders (all) (From admission, onward)     Start     Ordered    04/14/21 0913  Inpatient Orthopedic Surgery Consult  Once     Specialty:  Orthopedic Surgery  Provider:  Carlos Manuel Butt MD    04/14/21 0912    04/13/21 155  Inpatient Infectious Diseases Consult  Once        Specialty:  Infectious Diseases  Provider:  Adin Frederick MD    04/13/21 1552    04/13/21 1314  Inpatient IV Team Consult Midline 1 Lumen  Once     Provider:  (Not yet assigned)    04/13/21 1313    04/11/21 2209  Inpatient Case Management  Consult  Once     Provider:  (Not yet assigned)    04/11/21 2209    04/11/21 1901  Inpatient Plastic Surgery Consult  Once     Specialty:  Plastic Surgery  Provider:  Rodney Paul MD    04/11/21 1902    04/11/21 1806  Inpatient Nutrition Consult  Once     Provider:  (Not yet assigned)    04/11/21 1805    04/11/21 1706  Inpatient Access Center Consult  Once     Provider:  (Not yet assigned)    04/11/21 1705    04/11/21 1518  [facility] (on-call MD unless specified) Details  Once     Specialty:  Hospitalist  Provider:  (Not yet assigned)    04/11/21 1518              Procedures     * Surgery not found *      Imaging Results (All)     Procedure Component Value Units Date/Time    XR Humerus Right [[id number]] Collected: 04/13/21 1632     Updated: 04/13/21 1636    Narrative:      XR HUMERUS RIGHT-  04/13/2021     HISTORY: Follow-up fracture.     There is moderately severe displacement with overriding of the fracture  fragments of the proximal humerus. Humeral head appears fractured but  appears to articulate with the glenoid portion of the scapula.     There is large amount of callus formation/heterotopic ossification  surrounding the proximal humerus. No other fractures are seen.     The humerus fracture was also seen on the 01/17/2021 study. No new  fractures are seen. There are several old right rib fractures.       Impression:      Impacted or moderately severely displaced fracture of the  proximal humerus with override of the fracture fragments as described.  2. There is a large amount of callus formation/heterotopic ossification  in the proximal humerus.     This report was finalized on 4/13/2021 4:33 PM by Dr. Jhonathan Rendon M.D.       XR Pelvis 1 or 2  View [[id number]] Collected: 04/12/21 1442     Updated: 04/12/21 1828    Narrative:      ONE-VIEW AP PELVIS AND THREE-VIEW SACRUM AND COCCYX     HISTORY: Stage 4 sacral decubitus ulcers.     FINDINGS: The examination is quite limited because of poor patient  cooperation and motion. There appears to be prominent diffuse  osteoporosis combined with a large amount of bowel gas and large and  small bowel loops which somewhat obscures bony detail. The lateral view  shows air likely extending into deep decubitus ulcers at the level of  the distal sacrum. No definite adjacent bony destruction is seen by  plain film.     This report was finalized on 4/12/2021 6:25 PM by Dr. Dave Miller M.D.       XR Sacrum & Coccyx [[id number]] Collected: 04/12/21 1442     Updated: 04/12/21 1828    Narrative:      ONE-VIEW AP PELVIS AND THREE-VIEW SACRUM AND COCCYX     HISTORY: Stage 4 sacral decubitus ulcers.     FINDINGS: The examination is quite limited because of poor patient  cooperation and motion. There appears to be prominent diffuse  osteoporosis combined with a large amount of bowel gas and large and  small bowel loops which somewhat obscures bony detail. The lateral view  shows air likely extending into deep decubitus ulcers at the level of  the distal sacrum. No definite adjacent bony destruction is seen by  plain film.     This report was finalized on 4/12/2021 6:25 PM by Dr. Dave Miller M.D.       XR Chest 1 View [[id number]] Collected: 04/11/21 1321     Updated: 04/11/21 1327    Narrative:      XR CHEST 1 VW-     HISTORY: Female who is 50 years-old,  short of breath     TECHNIQUE: Frontal views of the chest     COMPARISON: 06/13/2018, correlated with right humerus x-ray from  01/17/2021     FINDINGS: Heart, mediastinum and pulmonary vasculature are unremarkable.  No focal pulmonary consolidation, pleural effusion, or pneumothorax. 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        Impression:      No evidence for acute pulmonary process. Follow-up as  clinical indications persist.     This report was finalized on 4/11/2021 1:23 PM by Dr. Tiago Brand M.D.               Pertinent Labs     Results from last 7 days   Lab Units 04/15/21  0716 04/14/21  1143 04/13/21  0907 04/12/21  0642   WBC 10*3/mm3 6.35 6.98 7.94 8.40   HEMOGLOBIN g/dL 11.4* 10.9* 13.1 12.5   PLATELETS 10*3/mm3 360 442 542* 492*     Results from last 7 days   Lab Units 04/15/21  0716 04/14/21  0722 04/13/21  0907 04/12/21  0642   SODIUM mmol/L 134* 133* 126* 134*   POTASSIUM mmol/L 3.6 3.7 3.2* 4.0   CHLORIDE mmol/L 101 99 91* 97*   CO2 mmol/L 22.8 25.1 22.2 29.5*   BUN mg/dL 6 6 10 14   CREATININE mg/dL 0.23* 0.27* 0.32* <0.47*   GLUCOSE mg/dL 81 80 229* 88   Estimated Creatinine Clearance: 186.4 mL/min (A) (by C-G formula based on SCr of 0.23 mg/dL (L)).  Results from last 7 days   Lab Units 04/13/21  0907 04/11/21  1303   ALBUMIN g/dL 2.60* 2.50*   BILIRUBIN mg/dL 0.3 0.4   ALK PHOS U/L 183* 193*   AST (SGOT) U/L 12 15   ALT (SGPT) U/L 14 12     Results from last 7 days   Lab Units 04/15/21  0716 04/14/21  0722 04/13/21  0907 04/12/21  0642 04/11/21  1303   CALCIUM mg/dL 8.3* 8.4* 8.3* 8.1* 8.4*   ALBUMIN g/dL  --   --  2.60*  --  2.50*   MAGNESIUM mg/dL  --  2.0  --   --  1.9     Results from last 7 days   Lab Units 04/11/21  1303   AMMONIA umol/L 27             Invalid input(s): LDLCALC  Results from last 7 days   Lab Units 04/11/21  1951 04/11/21  1536 04/11/21  1303   BLOODCX   --  No growth at 3 days No growth at 3 days   WOUNDCX  Scant growth (1+) Staphylococcus aureus, MRSA*  Scant growth (1+) Normal Skin Mignon  --   --      Results from last 7 days   Lab Units 04/11/21  1305   COVID19  Not Detected       Test Results Pending at Discharge     Pending Labs     Order Current Status    Ferritin In process    Vitamin B12 In process    Blood Culture - Blood, Arm, Left Preliminary result    Blood Culture -  Blood, Arm, Right Preliminary result          Discharge Details        Discharge Medications      New Medications      Instructions Start Date   acetaminophen 325 MG tablet  Commonly known as: TYLENOL   650 mg, Oral, Every 4 Hours PRN      doxycycline 100 MG capsule  Commonly known as: MONODOX   100 mg, Oral, Every 12 Hours Scheduled      folic acid 400 MCG tablet  Commonly known as: Folate   1 mg, Oral, Daily      HYDROcodone-acetaminophen 5-325 MG per tablet  Commonly known as: NORCO   1 tablet, Oral, Every 4 Hours PRN      sodium hypochlorite 0.125 % solution topical solution 0.125%  Commonly known as: DAKIN'S 1/4 STRENGTH   Apply to coccyx and rt ischial wound         Changes to Medications      Instructions Start Date   amitriptyline 150 MG tablet  Commonly known as: ELAVIL  What changed: how much to take   150 mg, Oral, Nightly         Continue These Medications      Instructions Start Date   albuterol sulfate  (90 Base) MCG/ACT inhaler  Commonly known as: PROVENTIL HFA;VENTOLIN HFA;PROAIR HFA   1-2 puffs, Inhalation, Every 4 Hours PRN      DULoxetine 30 MG capsule  Commonly known as: CYMBALTA   30 mg, Oral, Daily      levETIRAcetam 1000 MG tablet  Commonly known as: KEPPRA   1,000 mg, Oral, 2 Times Daily         Stop These Medications    nicotine 14 MG/24HR patch  Commonly known as: NICODERM CQ     OLANZapine 10 MG injection  Commonly known as: zyPREXA     zonisamide 100 MG capsule  Commonly known as: ZONEGRAN            No Known Allergies      Discharge Disposition:  Skilled Nursing Facility (DC - External)    Discharge Diet:  Diet Order   Procedures   • Diet Soft Texture; Chopped; Daily Fluid Restriction; 1500 mL Fluid Per Day       Discharge Activity:   Activity Instructions     Activity as Tolerated            CODE STATUS:    Code Status and Medical Interventions:   Ordered at: 04/11/21 1702     Level Of Support Discussed With:    Patient     Code Status:    CPR     Medical Interventions (Level of  Support Prior to Arrest):    Full       No future appointments.   Contact information for follow-up providers     Murphy Carty MD. Schedule an appointment as soon as possible for a visit in 1 week(s).    Specialty: Nurse Practitioner  Why: Follow up in 1 week after discharge  Contact information:  [address]  [facility] [place] [address]  [phone]             Jl Massey MD Follow up.    Specialty: Orthopedic Surgery  Why: Follow up as needed  Contact information:  [address]  [address]  [facility] [id number] [phone]                   Contact information for after-discharge care     Destination     [facility] .    Service: Skilled Nursing  Contact information:  [address]  [facility] [address] [phone]                             Time Spent on Discharge:  Greater than 30 minutes      Rema AnaKACIE Dean  [facility]  04/15/21  10:04 EDT      Addendum/attestation.  4/15/2021 at 10:32 AM    I have personally interviewed and examined the patient I agree with the discharge summary of Ms. Malhotra.  Patient was admitted with dysphagia for her multiple decubiti in the sacral area work-up indicated infection with MRSA.  Initially she was placed on IV Vanco and subsequently she was switched to oral doxycycline at the day of discharge.  Wound nurse was consulted and recommendations were for Dakin changes and decubiti precautions.  X-rays and inflammatory markers did not suggest osteomyelitis.  Plastic surgery consultation did not recommend any debridement.  Infectious disease consultation and agreed with the p.o. antibiotic at the time of discharge.  She did not have signs of osteomyelitis and there was no fever or leukocytosis.  She has a history of polysubstance abuse we started her on as needed Ativan for any withdrawal which she did not experience she has refused resources after access center has spoken to her.  She has a history of chronic pain  syndrome and we placed her on hydrocodone and IV Dilaudid during the hospitalization and the time discharge she is to continue as needed hydrocodone.  She has a history of a fracture of the right humerus orthopedic consult has been obtained recommended no intervention at this time has a history of seizures and depression we resumed her Keppra she did not have any seizure activity during the hospitalization.  She developed hypoosmolar hyponatremia TSH and cortisol were normal fluid restriction was employed and this has greatly improved.  She has a severe malnutrition and supplementation needs to be given she is status post nutritional consult.  She has a mobility disorder physical therapy was consulted recommended inpatient physical therapy.  She is to follow-up with primary care physician at the facility in 3 to 5 days and with wound center.  Physical exam today  Temperature 97.1 a pulse of 96 respiratory rate of 16 blood pressure 108/8020 O2 sats of 99% on room air  General.  Elderly female.  Underweight and cachectic looking.  No apparent pain distress or diaphoresis.  Eyes.  Pupils equal round and reactive intact extraocular musculature no pallor no jaundice normal conjunctivae and lids  Oral cavity moist mucous membrane  Neck.  Supple no JVD no lymphadenopathy no thyromegaly  Cardiovascular.  Regular rate and rhythm no gallops or murmurs  Chest.  Clear to auscultation bilaterally with no added sounds  Abdomen.  Soft lax no tenderness no organomegaly no guarding no rebound scaphoid abdomen  Extremities.  No clubbing cyanosis.  There is trace right upper extremity edema and a depressed wound of the right elbow removing the compression dressing there are 2 scabs wounds with no discharge or fluctuation.  Skin.  Dressed sacral ulcers.    CNS.  No acute focal neurological deficit

## 2021-04-15 NOTE — PROGRESS NOTES
Continued Stay Note  [facility]     Patient Name: Mary Chavez  MRN: [id number]  Today's Date: 4/15/2021    Admit Date: 4/11/2021    Discharge Plan     Row Name 04/15/21 1003       Plan    Plan  [facility] rehab    Plan Comments  Discharge order noted. Spoke to Kathy with [facility] who confirmed patient accepted and bed available today at [facility] rehab. Discussed with Kathy Westbrook note regarding wound clinic. Kathy stated the facility will assist in setting this up once patient is ready for DC'd from [facility]. Transportation scheduled for 3:30pm with [employer]. Confirmation # [id number]. Updated RN. Patient updated.        Discharge Codes    No documentation.       Expected Discharge Date and Time     Expected Discharge Date Expected Discharge Time    Apr 15, 2021             Areli Montes De Oca, RN

## 2021-04-15 NOTE — PLAN OF CARE
Goal Outcome Evaluation:  Plan of Care Reviewed With: patient     Outcome Summary: VS stable, dressing change done as ordered, brief in place, IV vancomycin given, PRN dilaudid and ativan given, am labs ordered, alarms in place for pt safety, possible D/C to Rehab facility tomorrow, will continue to monitor

## 2021-04-15 NOTE — PROGRESS NOTES
Continued Stay Note  [facility]     Patient Name: Mary Chavez  MRN: [id number]  Today's Date: 4/15/2021    Admit Date: 4/11/2021    Discharge Plan     Row Name 04/15/21 1142       Plan    Plan  [facility]    Plan Comments  Discharge summary faxed. Packet given to RN    Row Name 04/15/21 1033       Plan    Plan  [facility] rehab    Plan Comments  VM left with Lila Ch/RADHA at [phone] to return call.    Row Name 04/15/21 1003       Plan    Plan  [facility] rehab    Plan Comments  Discharge order noted. Spoke to Kathy with [facility] who confirmed patient accepted [facility] Discussed with Kathy Westbrook note regarding wound clinic. Kathy stated the facility will assist in setting this up once patient is ready for DC'd from [facility]. Transportation scheduled for 3:30pm with [employer] [place]. Confirmation # [id number]. Updated RN.   Patient updated.        Discharge Codes    No documentation.       Expected Discharge Date and Time     Expected Discharge Date Expected Discharge Time    Apr 15, 2021             Areli Montes De Oca, RN

## 2021-04-15 NOTE — PROGRESS NOTES
Continued Stay Note  [facility]     Patient Name: Mary Chavez  MRN: [id number]  Today's Date: 4/15/2021    Admit Date: 4/11/2021    Discharge Plan     Row Name 04/15/21 1033       Plan    Plan  [facility] rehab    Plan Comments  VM left with Lila Ch/[facility] at [phone] to return call.    Row Name 04/15/21 1003       Plan    Plan  [facility] rehab    Plan Comments  Discharge order noted. Spoke to Kathy with [facility] who confirmed patient accepted and bed available today at [facility] rehab. Discussed with Kathy Westbrook note regarding wound clinic. Kathy stated the facility will assist in setting this up once patient is ready for DC'd from [facility]. Transportation scheduled for 3:30pm with [employer]. Confirmation # [id number]. Updated RN.   Patient updated.        Discharge Codes    No documentation.       Expected Discharge Date and Time     Expected Discharge Date Expected Discharge Time    Apr 15, 2021             Areli Montes De Oca, RN
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
## 2021-04-15 NOTE — PLAN OF CARE
"Goal Outcome Evaluation:  Plan of Care Reviewed With: patient     Outcome Summary: Pt. able to ambulate 8 feet, Min. assist x 2, with HHA x 2 this date. Pt. requires Min. assist x 2 for bed mobility and Min. assist x 2 for sit <-> stand transfers.  Gentle Right Shld Flexion/Abduction AAROM x 5 reps performed although pt. limited due to reports of \"elbow\" pain. Verbal/tactile cues given during ambulation for posture correction. Limited in upright mobility due to fatigue and weakness.    Patient was wearing a face mask during this therapy encounter. Therapist used appropriate personal protective equipment including eye protection, mask, and gloves.  Mask used was standard procedure mask. Appropriate PPE was worn during the entire therapy session. Hand hygiene was completed before and after therapy session. Patient is not in enhanced droplet precautions.     "

## 2021-04-16 LAB
BACTERIA SPEC AEROBE CULT: NORMAL
BACTERIA SPEC AEROBE CULT: NORMAL

## 2021-04-16 NOTE — PROGRESS NOTES
Case Management Discharge Note      Final Note: Discharged to [facility]. Areli Montes De Oca RN         Selected Continued Care - Discharged on 4/15/2021 Admission date: 4/11/2021 - Discharge disposition: Skilled Nursing Facility (DC - External)    Destination Coordination complete    Service Provider Selected Services Address Phone Fax Patient Preferred    [facility]  Skilled Nursing [address] [address] [phone] --            Transportation Services  W[facility]C Van[facility]    Final Discharge Disposition Code: 03 - skilled nursing facility (SNF)
